# Patient Record
Sex: FEMALE | Race: WHITE | Employment: FULL TIME | URBAN - METROPOLITAN AREA
[De-identification: names, ages, dates, MRNs, and addresses within clinical notes are randomized per-mention and may not be internally consistent; named-entity substitution may affect disease eponyms.]

---

## 2019-03-27 ENCOUNTER — APPOINTMENT (OUTPATIENT)
Dept: GENERAL RADIOLOGY | Age: 74
End: 2019-03-27
Payer: COMMERCIAL

## 2019-03-27 ENCOUNTER — HOSPITAL ENCOUNTER (EMERGENCY)
Age: 74
Discharge: HOME | End: 2019-03-27
Attending: EMERGENCY MEDICINE
Payer: COMMERCIAL

## 2019-03-27 ENCOUNTER — APPOINTMENT (OUTPATIENT)
Dept: CT IMAGING | Age: 74
End: 2019-03-27
Payer: COMMERCIAL

## 2019-03-27 VITALS
TEMPERATURE: 98.5 F | DIASTOLIC BLOOD PRESSURE: 67 MMHG | HEART RATE: 77 BPM | BODY MASS INDEX: 32.44 KG/M2 | SYSTOLIC BLOOD PRESSURE: 155 MMHG | OXYGEN SATURATION: 97 % | RESPIRATION RATE: 16 BRPM | HEIGHT: 64 IN | WEIGHT: 190 LBS

## 2019-03-27 DIAGNOSIS — S09.90XA CLOSED HEAD INJURY, INITIAL ENCOUNTER: Primary | ICD-10-CM

## 2019-03-27 DIAGNOSIS — S83.92XA SPRAIN OF LEFT KNEE, UNSPECIFIED LIGAMENT, INITIAL ENCOUNTER: ICD-10-CM

## 2019-03-27 PROCEDURE — 73562 X-RAY EXAM OF KNEE 3: CPT

## 2019-03-27 PROCEDURE — 70450 CT HEAD/BRAIN W/O DYE: CPT

## 2019-03-27 PROCEDURE — 99284 EMERGENCY DEPT VISIT MOD MDM: CPT

## 2019-03-27 RX ORDER — LEVOTHYROXINE SODIUM 137 UG/1
137 TABLET ORAL
COMMUNITY
Start: 2019-01-03

## 2019-03-27 RX ORDER — PAROXETINE HYDROCHLORIDE 20 MG/1
20 TABLET, FILM COATED ORAL
COMMUNITY

## 2019-03-27 RX ORDER — NAPROXEN 500 MG/1
500 TABLET ORAL 2 TIMES DAILY WITH MEALS
Qty: 14 TABLET | Refills: 0 | Status: SHIPPED | OUTPATIENT
Start: 2019-03-27

## 2019-03-27 ASSESSMENT — PAIN SCALES - GENERAL: PAINLEVEL_OUTOF10: 5

## 2019-03-27 ASSESSMENT — PAIN DESCRIPTION - LOCATION: LOCATION: KNEE

## 2019-03-27 ASSESSMENT — PAIN DESCRIPTION - DESCRIPTORS: DESCRIPTORS: ACHING

## 2019-03-27 ASSESSMENT — PAIN DESCRIPTION - ONSET: ONSET: SUDDEN

## 2019-03-27 ASSESSMENT — PAIN DESCRIPTION - PAIN TYPE: TYPE: ACUTE PAIN

## 2019-03-27 ASSESSMENT — PAIN DESCRIPTION - ORIENTATION: ORIENTATION: LEFT

## 2019-12-18 ENCOUNTER — TELEPHONE (OUTPATIENT)
Dept: GASTROENTEROLOGY | Age: 74
End: 2019-12-18

## 2022-08-09 ENCOUNTER — HOSPITAL ENCOUNTER (OUTPATIENT)
Age: 77
Setting detail: SPECIMEN
Discharge: HOME | End: 2022-08-09

## 2022-08-10 LAB
CULTURE: NORMAL
SPECIMEN DESCRIPTION: NORMAL

## 2022-08-11 LAB
CULTURE: ABNORMAL
DIRECT EXAM: ABNORMAL
DIRECT EXAM: ABNORMAL
SPECIMEN DESCRIPTION: ABNORMAL

## 2022-08-14 LAB
CULTURE: ABNORMAL
CULTURE: ABNORMAL
DIRECT EXAM: ABNORMAL
DIRECT EXAM: ABNORMAL
SPECIMEN DESCRIPTION: ABNORMAL

## 2023-03-01 ENCOUNTER — HOSPITAL ENCOUNTER (OUTPATIENT)
Age: 78
Setting detail: SPECIMEN
Discharge: HOME | End: 2023-03-01

## 2023-03-01 LAB
25(OH)D3 SERPL-MCNC: 18.2 NG/ML
ABSOLUTE EOS #: 0.07 K/UL (ref 0–0.44)
ABSOLUTE IMMATURE GRANULOCYTE: <0.03 K/UL (ref 0–0.3)
ABSOLUTE LYMPH #: 1.94 K/UL (ref 1.1–3.7)
ABSOLUTE MONO #: 0.33 K/UL (ref 0.1–1.2)
ALBUMIN SERPL-MCNC: 4.8 G/DL (ref 3.5–5.2)
ALBUMIN/GLOBULIN RATIO: 1.5 (ref 1–2.5)
ALP SERPL-CCNC: 92 U/L (ref 35–104)
ALT SERPL-CCNC: 28 U/L (ref 5–33)
ANION GAP SERPL CALCULATED.3IONS-SCNC: 18 MMOL/L (ref 9–17)
AST SERPL-CCNC: 34 U/L
BASOPHILS # BLD: 0 % (ref 0–2)
BASOPHILS ABSOLUTE: 0.03 K/UL (ref 0–0.2)
BILIRUB SERPL-MCNC: 0.6 MG/DL (ref 0.3–1.2)
BILIRUBIN URINE: NEGATIVE
BUN SERPL-MCNC: 13 MG/DL (ref 8–23)
CALCIUM SERPL-MCNC: 9.8 MG/DL (ref 8.6–10.4)
CASTS UA: NORMAL /LPF (ref 0–8)
CHLORIDE SERPL-SCNC: 103 MMOL/L (ref 98–107)
CO2 SERPL-SCNC: 22 MMOL/L (ref 20–31)
COLOR: YELLOW
CREAT SERPL-MCNC: 1 MG/DL (ref 0.5–0.9)
CRP SERPL HS-MCNC: <3 MG/L (ref 0–5)
EOSINOPHILS RELATIVE PERCENT: 1 % (ref 1–4)
EPITHELIAL CELLS UA: NORMAL /HPF (ref 0–5)
ERYTHROCYTE [SEDIMENTATION RATE] IN BLOOD BY WESTERGREN METHOD: 1 MM/HR (ref 0–30)
FOLATE SERPL-MCNC: 10.2 NG/ML
GFR SERPL CREATININE-BSD FRML MDRD: 58 ML/MIN/1.73M2
GLUCOSE SERPL-MCNC: 78 MG/DL (ref 70–99)
GLUCOSE UR STRIP.AUTO-MCNC: NEGATIVE MG/DL
HCT VFR BLD AUTO: 49.5 % (ref 36.3–47.1)
HGB BLD-MCNC: 16.6 G/DL (ref 11.9–15.1)
HIV 1+2 AB+HIV1 P24 AG SERPL QL IA: NONREACTIVE
IMMATURE GRANULOCYTES: 0 %
KETONES UR STRIP.AUTO-MCNC: NEGATIVE MG/DL
LEUKOCYTE ESTERASE UR QL STRIP.AUTO: ABNORMAL
LYMPHOCYTES # BLD: 28 % (ref 24–43)
MCH RBC QN AUTO: 32.5 PG (ref 25.2–33.5)
MCHC RBC AUTO-ENTMCNC: 33.5 G/DL (ref 28.4–34.8)
MCV RBC AUTO: 96.9 FL (ref 82.6–102.9)
MONOCYTES # BLD: 5 % (ref 3–12)
NITRITE UR QL STRIP.AUTO: NEGATIVE
NRBC AUTOMATED: 0 PER 100 WBC
PDW BLD-RTO: 13.3 % (ref 11.8–14.4)
PLATELET # BLD AUTO: 212 K/UL (ref 138–453)
PMV BLD AUTO: 10.9 FL (ref 8.1–13.5)
POTASSIUM SERPL-SCNC: 4.4 MMOL/L (ref 3.7–5.3)
PROT SERPL-MCNC: 7.9 G/DL (ref 6.4–8.3)
PROT UR STRIP.AUTO-MCNC: 5.5 MG/DL (ref 5–8)
PROT UR STRIP.AUTO-MCNC: NEGATIVE MG/DL
RBC # BLD: 5.11 M/UL (ref 3.95–5.11)
RBC CLUMPS #/AREA URNS AUTO: NORMAL /HPF (ref 0–4)
SEG NEUTROPHILS: 66 % (ref 36–65)
SEGMENTED NEUTROPHILS ABSOLUTE COUNT: 4.45 K/UL (ref 1.5–8.1)
SODIUM SERPL-SCNC: 143 MMOL/L (ref 135–144)
SPECIFIC GRAVITY UA: 1.01 (ref 1–1.03)
T4 FREE SERPL-MCNC: 0.45 NG/DL (ref 0.93–1.7)
TSH SERPL-ACNC: 176.9 UIU/ML (ref 0.3–5)
TURBIDITY: CLEAR
URINE HGB: ABNORMAL
UROBILINOGEN, URINE: NORMAL
VIT B12 SERPL-MCNC: 341 PG/ML (ref 232–1245)
WBC # BLD AUTO: 6.8 K/UL (ref 3.5–11.3)
WBC UA: NORMAL /HPF (ref 0–5)

## 2023-03-02 LAB
ANA SER QL IA: NEGATIVE
DSDNA IGG SER QL IA: <0.5 IU/ML
MICROORGANISM SPEC CULT: NORMAL
NUCLEAR IGG SER IA-RTO: 0.2 U/ML
SPECIMEN DESCRIPTION: NORMAL

## 2023-03-07 ENCOUNTER — OFFICE VISIT (OUTPATIENT)
Dept: NEUROLOGY | Age: 78
End: 2023-03-07
Payer: COMMERCIAL

## 2023-03-07 VITALS
RESPIRATION RATE: 18 BRPM | BODY MASS INDEX: 32.79 KG/M2 | HEART RATE: 57 BPM | OXYGEN SATURATION: 98 % | SYSTOLIC BLOOD PRESSURE: 105 MMHG | TEMPERATURE: 98 F | DIASTOLIC BLOOD PRESSURE: 65 MMHG | WEIGHT: 191 LBS

## 2023-03-07 DIAGNOSIS — R41.89 COGNITIVE IMPAIRMENT: Primary | ICD-10-CM

## 2023-03-07 PROCEDURE — 99204 OFFICE O/P NEW MOD 45 MIN: CPT | Performed by: STUDENT IN AN ORGANIZED HEALTH CARE EDUCATION/TRAINING PROGRAM

## 2023-03-07 PROCEDURE — 1123F ACP DISCUSS/DSCN MKR DOCD: CPT | Performed by: STUDENT IN AN ORGANIZED HEALTH CARE EDUCATION/TRAINING PROGRAM

## 2023-03-07 RX ORDER — DONEPEZIL HYDROCHLORIDE 5 MG/1
5 TABLET, FILM COATED ORAL NIGHTLY
Qty: 30 TABLET | Refills: 3 | Status: SHIPPED | OUTPATIENT
Start: 2023-03-07

## 2023-03-07 RX ORDER — METOPROLOL SUCCINATE 25 MG/1
25 TABLET, EXTENDED RELEASE ORAL 2 TIMES DAILY
COMMUNITY

## 2023-03-07 RX ORDER — MELATONIN
1000 DAILY
Qty: 90 TABLET | Refills: 1 | Status: SHIPPED | OUTPATIENT
Start: 2023-03-07

## 2023-03-07 ASSESSMENT — ENCOUNTER SYMPTOMS
WHEEZING: 0
VOMITING: 0
PHOTOPHOBIA: 0
NAUSEA: 0
BACK PAIN: 0

## 2023-03-07 NOTE — LETTER
March 7, 2023      Yaritza Mckinnon, [address]      Patient: Harshil Banks   MR Number: [id number]   YOB: 1945   Date of Visit: 3/7/2023       Dear Yaritza Mckinnon: Thank you for referring Valentino Angry to me for evaluation/treatment. Below are the relevant portions of my assessment and plan of care. Patient given low vitamin D and history of hypothyroidism with recent low T4 can be associated with cognitive impairment. Although there is some domains that she also struggles therefore we plan to get neuropsych evaluation and follow-up on the MRI brain. We started patient on Aricept 5 mg nightly. If you have questions, please do not hesitate to call me. I look forward to following Carrie Gregory along with you.     Sincerely, Dillon Cordon MD

## 2023-03-07 NOTE — PROGRESS NOTES
[address] [place] [address],  [address], [facility], [address], [address] [place] [address]  P: [phone]  F: [phone]    NEUROLOGY CLINIC NOTE     PATIENT NAME: Ghislaine Foster  PATIENT MRN: [id number]  PRIMARY CARE PHYSICIAN: Flor Prakash MD    HPI:      Ghislaine Foster is a 69 y.o. right handed  female with PMH significant for depression on fluoxetine, dyslipidemia, vitamin D deficiency hypothyroidism on levothyroxine, GERD, vitamin D deficiency, hypertension and anxiety presented after being referred by PCP Dr. Violeta Fonseca for concern of cognitive impairment/early dementia. On my evaluation, patient is otherwise healthy with no history of diabetes or hypertension but has history of depression on fluoxetine (denies any worsening of symptoms or feeling of suicidal thoughts or ideation) complaint of having difficulty focusing on things and sometimes finding it difficult to remember things. She is otherwise independent lives alone and has a cat. She is independent in her daily chores. Able to do her own grocery and do her own finances. She also drives and does not have any issues in the neighborhood or getting lost (sometimes have some GPS issues but otherwise she does not have any issues). She is able to cook, bath, change close, eat and is currently retired. She used to be a  but now is retired. On exam, she was awake, alert oriented x2. No cranial nerve deficit noticed. No motor or sensory deficit noticed. On [address] [place] evaluation, she was a little anxious and scored 19 on the test. Patient has an MRI brain with and without contrast ordered by primary care physician Dr. Faye Fink. Patient has low T3 and eleavted TSH. Vitamin D is 18. Vitamin B12 is on the lower side. Discussed with patient that given she has scored only 19 we plan to start on Aricept.   We will conveyed to primary care physician about readjusting thyroid medication as hypothyroidism can also be associated with cognitive impairment. Plan to get neuropsych evaluation. Started on vitamin B12 and vitamin D3 replacement. Plan to follow-up in 4 months. PATIENT HISTORY:     Past Medical History:   Diagnosis Date    Thyroid disease         No past surgical history on file. Social History     Socioeconomic History    Marital status: Single     Spouse name: Not on file    Number of children: Not on file    Years of education: Not on file    Highest education level: Not on file   Occupational History    Not on file   Tobacco Use    Smoking status: Never    Smokeless tobacco: Never   Substance and Sexual Activity    Alcohol use: Never    Drug use: Never    Sexual activity: Not on file   Other Topics Concern    Not on file   Social History Narrative    Not on file     Social Determinants of Health     Financial Resource Strain: Not on file   Food Insecurity: Not on file   Transportation Needs: Not on file   Physical Activity: Not on file   Stress: Not on file   Social Connections: Not on file   Intimate Partner Violence: Not on file   Housing Stability: Not on file        Current Outpatient Medications   Medication Sig Dispense Refill    levothyroxine (SYNTHROID) 137 MCG tablet Take 137 mcg by mouth      PARoxetine (PAXIL) 20 MG tablet Take 20 mg by mouth      naproxen (NAPROSYN) 500 MG tablet Take 1 tablet by mouth 2 times daily (with meals) 14 tablet 0     No current facility-administered medications for this visit. Allergies   Allergen Reactions    Latex      Band Aid    Tetanus Toxoid      seizures        REVIEW OF SYSTEMS:     Review of Systems   Constitutional:  Negative for fatigue. Eyes:  Negative for photophobia and visual disturbance. Respiratory:  Negative for wheezing. Cardiovascular:  Negative for chest pain. Gastrointestinal:  Negative for nausea and vomiting. Genitourinary:  Negative for dysuria. Musculoskeletal:  Negative for back pain.    Neurological:  Negative for facial asymmetry, speech difficulty and light-headedness. Psychiatric/Behavioral:  Positive for decreased concentration. Negative for sleep disturbance and suicidal ideas. The patient is not nervous/anxious. Memory issues      VITALS  There were no vitals taken for this visit. PHYSICAL EXAMINATION:     Physical Exam     Constitutional: Well developed, well nourished and in no acute distress. Head:  normocephalic, atraumatic. Neck: supple, no carotid bruits, thyroid not palpable  Respiratory: Clear to auscultation bilaterally with no use of accessory muscles during respiration. Cardiovascular: normal rate, regular rhythm, no murmur, gallop, rub. Abdomen: Soft, nontender, nondistended, normal bowel sounds,    Extremities:  peripheral pulses palpable, no pedal edema or calf pain with palpation  Psych: normal affect      NEUROLOGICAL EXAMINATION:     Mental status   Awake, alert and oriented to herself, place, month and year but unable to recall date. Poor immediate and delayed recall. Difficulty in simple subtraction. , speech or language problems; no hallucinations or delusions     Cranial nerves   II - visual fields intact to confrontation                                                III, IV, VI - extra-ocular muscles full: no pupillary defect; no RUTHY, no nystagmus, left eye drooping (ptosis) as per patient she had injury and it has been a little worse since.   V - normal facial sensation                                                               VII - normal facial symmetry                                                             VIII - intact hearing                                                                             IX, X - symmetrical palate                                                                  XI - symmetrical shoulder shrug                                                       XII - midline tongue without atrophy or fasciculation     Motor function  Normal muscle bulk and tone  Muscle strength: normal power 5/5  fine motor movements     Sensory function Intact to touch, pin prick, vibration, proprioception in bilateral upper and lower extremities. Cerebellar Intact fine motor movement. No involuntary movements or tremors     Reflex function Intact 2+ DTR and symmetric. Negative Babinski     Gait                  Normal station and gait           PRIOR TESTS AND IMAGING: Following images and Labs were reviewed by the examiner     CT head without contrast 3/27/2019: No acute intracranial abnormality. 3/1/2023  Vit D, 25-Hydroxy >29.9 ng/mL 18.2 Low       Thyroxine, Free 0.93 - 1.70 ng/dL 0.45 Low       TSH 0.30 - 5.00 uIU/mL 176.90 High       Component Ref Range & Units 3/1/23 1200   Vitamin B-12 232 - 1245 pg/mL 341    Folate >4.8 ng/mL 10.2        ASSESSMENT       Isacc Phillips is a 69 y.o. right handed  female with PMH significant for depression on fluoxetine, dyslipidemia, vitamin D deficiency hypothyroidism on levothyroxine, GERD, vitamin D deficiency, hypertension and anxiety presented after being referred by PCP Dr. Emil Dejesus for concern of cognitive impairment/early dementia. MOCA score 19. Cognitive impairment; could be secondary from hypothyroidism or low vitamin D or pseudodementia from depression or anxiety  Neuropsych evaluation early dementia. Hypothyroidism elevated T4  Low vitamin D      PLAN:     Plan to follow-up on MRI brain w/wo ordered by the PCP. Neuropsych evaluation. Aricept 5 mg nightly  Vitamin B12 1000 mcg daily  Vitamin D3 1000 mcg daily. Patient was counseled about the side effects from Aricept such as nausea vomiting cramps. Plan to reach out to PCP if levothyroxine can be adjusted based on low thyroid hormone. Patient was counseled about how low vitamin D or thyroid hormone can also present as pseudodementia.       Follow up in the clinic in 4 months  Instructed patient to call the clinic if symptoms worsen or develop any new symptoms.     Ms. Tran received counseling on the following healthy behaviors: medical compliance, smoking cessation, blood pressure control, regular follow up with primary doctor.      I have spent 60 minutes face to face with the patient more than 50% of this time was spent counseling and coordinating care.      Electronically signed by Nathaniel Riggins MD on 3/7/2023 at 1:28 PM

## 2023-03-09 ENCOUNTER — TRANSCRIBE ORDERS (OUTPATIENT)
Dept: ADMINISTRATIVE | Age: 78
End: 2023-03-09

## 2023-03-14 ENCOUNTER — HOSPITAL ENCOUNTER (OUTPATIENT)
Dept: MRI IMAGING | Age: 78
Discharge: HOME | End: 2023-03-16
Payer: COMMERCIAL

## 2023-03-14 DIAGNOSIS — R41.89 COGNITIVE IMPAIRMENT: ICD-10-CM

## 2023-03-14 PROCEDURE — A9579 GAD-BASE MR CONTRAST NOS,1ML: HCPCS | Performed by: FAMILY MEDICINE

## 2023-03-14 PROCEDURE — 70553 MRI BRAIN STEM W/O & W/DYE: CPT

## 2023-03-14 PROCEDURE — 6360000004 HC RX CONTRAST MEDICATION: Performed by: FAMILY MEDICINE

## 2023-03-14 RX ADMIN — GADOTERIDOL 18 ML: 279.3 INJECTION, SOLUTION INTRAVENOUS at 11:01

## 2023-05-11 ENCOUNTER — HOSPITAL ENCOUNTER (OUTPATIENT)
Age: 78
Setting detail: SPECIMEN
Discharge: HOME | End: 2023-05-11

## 2023-05-12 LAB
25(OH)D3 SERPL-MCNC: 24.6 NG/ML
FOLATE SERPL-MCNC: 16.6 NG/ML
T4 FREE SERPL-MCNC: 1.8 NG/DL (ref 0.9–1.7)
TSH SERPL-ACNC: 0.05 UIU/ML (ref 0.3–5)
VIT B12 SERPL-MCNC: 536 PG/ML (ref 232–1245)

## 2023-07-06 RX ORDER — LANOLIN ALCOHOL/MO/W.PET/CERES
CREAM (GRAM) TOPICAL
Qty: 30 TABLET | Refills: 3 | Status: SHIPPED | OUTPATIENT
Start: 2023-07-06

## 2023-07-06 NOTE — TELEPHONE ENCOUNTER
E-scribe requesting refill for Vitamin B-12. Please review and e-scribe if applicable.      Last Visit Date: 3/7/2023    Next Visit Date:  Future Appointments   Date Time Provider [address] [place] [address]   7/18/2023  1:30 PM Abel Meyer MD Neuro [facility] Neurology -

## 2023-07-13 RX ORDER — DONEPEZIL HYDROCHLORIDE 5 MG/1
TABLET, FILM COATED ORAL
Qty: 30 TABLET | Refills: 3 | Status: SHIPPED | OUTPATIENT
Start: 2023-07-13

## 2023-07-13 NOTE — TELEPHONE ENCOUNTER
E-scribe requesting refill for Donepezil. Please review and e-scribe if applicable.      Last Visit Date: 03/07/23    Next Visit Date:  Future Appointments   Date Time Provider [address] [place] [address]   7/18/2023  1:30 PM Nolan Murillo MD Neuro [facility] Neurology -

## 2023-07-18 ENCOUNTER — OFFICE VISIT (OUTPATIENT)
Dept: NEUROLOGY | Age: 78
End: 2023-07-18
Payer: COMMERCIAL

## 2023-07-18 VITALS
TEMPERATURE: 97 F | BODY MASS INDEX: 29.4 KG/M2 | HEART RATE: 55 BPM | DIASTOLIC BLOOD PRESSURE: 71 MMHG | WEIGHT: 172.2 LBS | HEIGHT: 64 IN | OXYGEN SATURATION: 97 % | SYSTOLIC BLOOD PRESSURE: 109 MMHG

## 2023-07-18 DIAGNOSIS — R41.89 COGNITIVE IMPAIRMENT: Primary | ICD-10-CM

## 2023-07-18 DIAGNOSIS — R41.840 ATTENTION DEFICIT: ICD-10-CM

## 2023-07-18 PROCEDURE — 99214 OFFICE O/P EST MOD 30 MIN: CPT | Performed by: STUDENT IN AN ORGANIZED HEALTH CARE EDUCATION/TRAINING PROGRAM

## 2023-07-18 PROCEDURE — 1123F ACP DISCUSS/DSCN MKR DOCD: CPT | Performed by: STUDENT IN AN ORGANIZED HEALTH CARE EDUCATION/TRAINING PROGRAM

## 2023-07-18 RX ORDER — DONEPEZIL HYDROCHLORIDE 5 MG/1
10 TABLET, FILM COATED ORAL NIGHTLY
Qty: 30 TABLET | Refills: 3 | Status: SHIPPED | OUTPATIENT
Start: 2023-07-18

## 2023-07-18 RX ORDER — UREA 10 %
1 LOTION (ML) TOPICAL NIGHTLY PRN
Qty: 30 TABLET | Refills: 1 | Status: SHIPPED | OUTPATIENT
Start: 2023-07-18

## 2023-07-18 ASSESSMENT — ENCOUNTER SYMPTOMS
BACK PAIN: 0
NAUSEA: 0
VOMITING: 0
WHEEZING: 0
PHOTOPHOBIA: 0

## 2023-08-01 ENCOUNTER — TELEPHONE (OUTPATIENT)
Dept: NEUROLOGY | Age: 78
End: 2023-08-01

## 2023-08-01 NOTE — TELEPHONE ENCOUNTER
The patient has been watching a doctor on television talking about the side effects. Patient daughter stated that the patient said she is going to start weaning herself off all her medication.

## 2023-08-17 ENCOUNTER — TELEPHONE (OUTPATIENT)
Dept: NEUROLOGY | Age: 78
End: 2023-08-17

## 2023-08-17 DIAGNOSIS — R41.89 COGNITIVE IMPAIRMENT: Primary | ICD-10-CM

## 2023-11-21 ENCOUNTER — OFFICE VISIT (OUTPATIENT)
Dept: NEUROLOGY | Age: 78
End: 2023-11-21

## 2023-11-21 VITALS
BODY MASS INDEX: 29.37 KG/M2 | HEART RATE: 60 BPM | DIASTOLIC BLOOD PRESSURE: 68 MMHG | WEIGHT: 172 LBS | HEIGHT: 64 IN | SYSTOLIC BLOOD PRESSURE: 119 MMHG | OXYGEN SATURATION: 96 %

## 2023-11-21 DIAGNOSIS — F02.B0 MODERATE LATE ONSET ALZHEIMER'S DEMENTIA WITHOUT BEHAVIORAL DISTURBANCE, PSYCHOTIC DISTURBANCE, MOOD DISTURBANCE, OR ANXIETY (HCC): ICD-10-CM

## 2023-11-21 DIAGNOSIS — G30.1 MODERATE LATE ONSET ALZHEIMER'S DEMENTIA WITHOUT BEHAVIORAL DISTURBANCE, PSYCHOTIC DISTURBANCE, MOOD DISTURBANCE, OR ANXIETY (HCC): ICD-10-CM

## 2023-11-21 DIAGNOSIS — R41.89 COGNITIVE IMPAIRMENT: Primary | ICD-10-CM

## 2023-11-21 RX ORDER — DONEPEZIL HYDROCHLORIDE 5 MG/1
10 TABLET, FILM COATED ORAL NIGHTLY
Qty: 30 TABLET | Refills: 3 | Status: SHIPPED | OUTPATIENT
Start: 2023-11-21

## 2023-11-21 RX ORDER — MELATONIN
1000 DAILY
Qty: 90 TABLET | Refills: 1 | Status: SHIPPED | OUTPATIENT
Start: 2023-11-21

## 2023-11-21 RX ORDER — LANOLIN ALCOHOL/MO/W.PET/CERES
1000 CREAM (GRAM) TOPICAL DAILY
Qty: 30 TABLET | Refills: 3 | Status: SHIPPED | OUTPATIENT
Start: 2023-11-21

## 2023-11-21 ASSESSMENT — ENCOUNTER SYMPTOMS
PHOTOPHOBIA: 0
WHEEZING: 0
BACK PAIN: 0
VOMITING: 0
NAUSEA: 0

## 2023-11-21 NOTE — PROGRESS NOTES
[address], [facility] [place] [facility], [address], [address]  P: [phone]  F: [phone]    NEUROLOGY CLINIC NOTE     PATIENT NAME: David Roca  PATIENT MRN: [id number]  PRIMARY CARE PHYSICIAN: Adrian Pineda MD    HPI:      David Roca is a 69 y.o. right handed  female with PMH significant for depression on fluoxetine, dyslipidemia, vitamin D deficiency hypothyroidism on levothyroxine, GERD, vitamin D deficiency, hypertension and anxiety presented after being referred by PCP Dr. Josh Weiss for concern of cognitive impairment/early dementia. On my evaluation, patient is otherwise healthy with no history of diabetes or hypertension but has history of depression on fluoxetine (denies any worsening of symptoms or feeling of suicidal thoughts or ideation) complaint of having difficulty focusing on things and sometimes finding it difficult to remember things. She is otherwise independent lives alone and has a cat. She is independent in her daily chores. Able to do her own grocery and do her own finances. She also drives and does not have any issues in the neighborhood or getting lost (sometimes have some GPS issues but otherwise she does not have any issues). She is able to cook, bath, change close, eat and is currently retired. She used to be a  but now is retired. On exam, she was awake, alert oriented x2. No cranial nerve deficit noticed. No motor or sensory deficit noticed. On [facility] evaluation, she was a little anxious and scored 19 on the test. Patient has an MRI brain with and without contrast ordered by primary care physician Dr. Nikolas Love. Patient has low T3 and eleavted TSH. Vitamin D is 18. Vitamin B12 is on the lower side. Discussed with patient that given she has scored only 19 we plan to start on Aricept.   We will conveyed to primary care physician about readjusting thyroid medication as hypothyroidism can also

## 2023-12-13 ENCOUNTER — TELEPHONE (OUTPATIENT)
Dept: NEUROSURGERY | Age: 78
End: 2023-12-13

## 2023-12-13 DIAGNOSIS — R41.89 MODERATE COGNITIVE IMPAIRMENT: Primary | ICD-10-CM

## 2023-12-14 NOTE — TELEPHONE ENCOUNTER
[facility]abil[facility]) called and stated Dae Reyes is refusing all medication, she's angry, hallucinating and seem to be out of [address] with reality. Mara Fortune is requesting a referral for . Please call Mara Fortune at [phone].

## 2023-12-14 NOTE — TELEPHONE ENCOUNTER
Order placed for social work consult. Patient's granddaughter Dior Jones [facility]) called, patient's condition and further treatment and follow-up are discussed. All concerns and questions addressed.       Simona Cast MD  Neurology Resident PGY-4  12/14/2023  2:12 PM

## 2024-01-25 ENCOUNTER — TELEPHONE (OUTPATIENT)
Dept: NEUROLOGY | Age: 79
End: 2024-01-25

## 2024-01-25 NOTE — TELEPHONE ENCOUNTER
\"FYI\"  Patient caregiver Nabila called, they have not yet received a call from the .  I faxed letter of referral to the PCP along with clinical notes, they will get her taken care of.

## 2024-03-01 ENCOUNTER — HOSPITAL ENCOUNTER (OUTPATIENT)
Age: 79
Setting detail: SPECIMEN
Discharge: HOME | End: 2024-03-01

## 2024-03-01 LAB
25(OH)D3 SERPL-MCNC: 26.2 NG/ML
ANION GAP SERPL CALCULATED.3IONS-SCNC: 10 MMOL/L (ref 9–17)
BASOPHILS # BLD: <0.03 K/UL (ref 0–0.2)
BASOPHILS NFR BLD: 0 % (ref 0–2)
BUN SERPL-MCNC: 16 MG/DL (ref 8–23)
CALCIUM SERPL-MCNC: 9.3 MG/DL (ref 8.6–10.4)
CHLORIDE SERPL-SCNC: 103 MMOL/L (ref 98–107)
CHOLEST SERPL-MCNC: 202 MG/DL
CHOLESTEROL/HDL RATIO: 3.7
CO2 SERPL-SCNC: 24 MMOL/L (ref 20–31)
CREAT SERPL-MCNC: 0.7 MG/DL (ref 0.5–0.9)
EOSINOPHIL # BLD: 0.04 K/UL (ref 0–0.44)
EOSINOPHILS RELATIVE PERCENT: 1 % (ref 1–4)
ERYTHROCYTE [DISTWIDTH] IN BLOOD BY AUTOMATED COUNT: 12.7 % (ref 11.8–14.4)
EST. AVERAGE GLUCOSE BLD GHB EST-MCNC: 94 MG/DL
GFR SERPL CREATININE-BSD FRML MDRD: >60 ML/MIN/1.73M2
GLUCOSE P FAST SERPL-MCNC: 89 MG/DL (ref 70–99)
HBA1C MFR BLD: 4.9 % (ref 4–6)
HCT VFR BLD AUTO: 47 % (ref 36.3–47.1)
HDLC SERPL-MCNC: 54 MG/DL
HGB BLD-MCNC: 15.8 G/DL (ref 11.9–15.1)
IMM GRANULOCYTES # BLD AUTO: <0.03 K/UL (ref 0–0.3)
IMM GRANULOCYTES NFR BLD: 0 %
LDLC SERPL CALC-MCNC: 123 MG/DL (ref 0–130)
LYMPHOCYTES NFR BLD: 1.61 K/UL (ref 1.1–3.7)
LYMPHOCYTES RELATIVE PERCENT: 29 % (ref 24–43)
MCH RBC QN AUTO: 33.5 PG (ref 25.2–33.5)
MCHC RBC AUTO-ENTMCNC: 33.6 G/DL (ref 28.4–34.8)
MCV RBC AUTO: 99.6 FL (ref 82.6–102.9)
MONOCYTES NFR BLD: 0.37 K/UL (ref 0.1–1.2)
MONOCYTES NFR BLD: 7 % (ref 3–12)
NEUTROPHILS NFR BLD: 63 % (ref 36–65)
NEUTS SEG NFR BLD: 3.43 K/UL (ref 1.5–8.1)
NRBC BLD-RTO: 0 PER 100 WBC
PLATELET # BLD AUTO: 230 K/UL (ref 138–453)
PMV BLD AUTO: 10.7 FL (ref 8.1–13.5)
POTASSIUM SERPL-SCNC: 4.4 MMOL/L (ref 3.7–5.3)
RBC # BLD AUTO: 4.72 M/UL (ref 3.95–5.11)
SODIUM SERPL-SCNC: 137 MMOL/L (ref 135–144)
TRIGL SERPL-MCNC: 123 MG/DL
TSH SERPL DL<=0.05 MIU/L-ACNC: 2.51 UIU/ML (ref 0.3–5)
VIT B12 SERPL-MCNC: 411 PG/ML (ref 232–1245)
WBC OTHER # BLD: 5.5 K/UL (ref 3.5–11.3)

## 2024-04-22 ENCOUNTER — TRANSCRIBE ORDERS (OUTPATIENT)
Dept: ADMINISTRATIVE | Age: 79
End: 2024-04-22

## 2024-04-22 DIAGNOSIS — F03.90 SENILE DEMENTIA, UNCOMPLICATED (HCC): Primary | ICD-10-CM

## 2024-04-22 DIAGNOSIS — D32.0 BENIGN NEOPLASM OF CEREBRAL MENINGES (HCC): ICD-10-CM

## 2024-04-24 ENCOUNTER — HOSPITAL ENCOUNTER (OUTPATIENT)
Dept: MRI IMAGING | Age: 79
Discharge: HOME | End: 2024-04-26
Attending: FAMILY MEDICINE
Payer: MEDICARE

## 2024-04-24 DIAGNOSIS — D32.0 BENIGN NEOPLASM OF CEREBRAL MENINGES (HCC): ICD-10-CM

## 2024-04-24 DIAGNOSIS — F03.90 SENILE DEMENTIA, UNCOMPLICATED (HCC): ICD-10-CM

## 2024-04-24 LAB
EGFR, POC: 66 ML/MIN/1.73M2
POC CREATININE: 0.9 MG/DL (ref 0.51–1.19)

## 2024-04-24 PROCEDURE — 6360000004 HC RX CONTRAST MEDICATION: Performed by: FAMILY MEDICINE

## 2024-04-24 PROCEDURE — A9576 INJ PROHANCE MULTIPACK: HCPCS | Performed by: FAMILY MEDICINE

## 2024-04-24 PROCEDURE — 70553 MRI BRAIN STEM W/O & W/DYE: CPT

## 2024-04-24 PROCEDURE — 2580000003 HC RX 258: Performed by: FAMILY MEDICINE

## 2024-04-24 PROCEDURE — 82565 ASSAY OF CREATININE: CPT

## 2024-04-24 RX ORDER — SODIUM CHLORIDE 0.9 % (FLUSH) 0.9 %
10 SYRINGE (ML) INJECTION PRN
Status: DISCONTINUED | OUTPATIENT
Start: 2024-04-24 | End: 2024-04-27 | Stop reason: HOSPADM

## 2024-04-24 RX ADMIN — GADOTERIDOL 13 ML: 279.3 INJECTION, SOLUTION INTRAVENOUS at 09:53

## 2024-04-24 RX ADMIN — SODIUM CHLORIDE, PRESERVATIVE FREE 10 ML: 5 INJECTION INTRAVENOUS at 09:53

## 2025-04-05 ENCOUNTER — HOSPITAL ENCOUNTER (OUTPATIENT)
Age: 80
Setting detail: SPECIMEN
Discharge: HOME | End: 2025-04-05

## 2025-04-05 LAB
25(OH)D3 SERPL-MCNC: 21.6 NG/ML (ref 30–100)
ALBUMIN SERPL-MCNC: 4.5 G/DL (ref 3.5–5.2)
ALBUMIN/GLOB SERPL: 1.9 {RATIO} (ref 1–2.5)
ALP SERPL-CCNC: 65 U/L (ref 35–104)
ALT SERPL-CCNC: 38 U/L (ref 10–35)
ANION GAP SERPL CALCULATED.3IONS-SCNC: 13 MMOL/L (ref 9–16)
AST SERPL-CCNC: 49 U/L (ref 10–35)
BILIRUB SERPL-MCNC: 0.5 MG/DL (ref 0–1.2)
BUN SERPL-MCNC: 17 MG/DL (ref 8–23)
CALCIUM SERPL-MCNC: 9.7 MG/DL (ref 8.6–10.4)
CHLORIDE SERPL-SCNC: 108 MMOL/L (ref 98–107)
CO2 SERPL-SCNC: 26 MMOL/L (ref 20–31)
CREAT SERPL-MCNC: 1.2 MG/DL (ref 0.6–0.9)
ERYTHROCYTE [DISTWIDTH] IN BLOOD BY AUTOMATED COUNT: 13 % (ref 11.8–14.4)
ERYTHROCYTE [SEDIMENTATION RATE] IN BLOOD BY PHOTOMETRIC METHOD: 4 MM/HR (ref 0–30)
GFR, ESTIMATED: 46 ML/MIN/1.73M2
GLUCOSE SERPL-MCNC: 110 MG/DL (ref 74–99)
HCT VFR BLD AUTO: 41 % (ref 36.3–47.1)
HGB BLD-MCNC: 13.5 G/DL (ref 11.9–15.1)
MCH RBC QN AUTO: 34.4 PG (ref 25.2–33.5)
MCHC RBC AUTO-ENTMCNC: 32.9 G/DL (ref 28.4–34.8)
MCV RBC AUTO: 104.3 FL (ref 82.6–102.9)
NRBC BLD-RTO: 0 PER 100 WBC
PLATELET # BLD AUTO: 169 K/UL (ref 138–453)
PMV BLD AUTO: 10.6 FL (ref 8.1–13.5)
POTASSIUM SERPL-SCNC: 3.4 MMOL/L (ref 3.7–5.3)
PROT SERPL-MCNC: 6.9 G/DL (ref 6.6–8.7)
RBC # BLD AUTO: 3.93 M/UL (ref 3.95–5.11)
SODIUM SERPL-SCNC: 147 MMOL/L (ref 136–145)
VIT B12 SERPL-MCNC: 299 PG/ML (ref 232–1245)
WBC OTHER # BLD: 5.9 K/UL (ref 3.5–11.3)

## 2025-04-08 LAB
DEPRECATED S PNEUM5 IGG SER-MCNC: 0.5 U/ML
ENA JO1 IGG SER-ACNC: <0.4 U/ML
ENA SCL70 AB SER IA-ACNC: 0.7 U/ML
ENA SM AB SER-ACNC: 0.9 U/ML
ENA SS-A 60KD AB SER-ACNC: <0.5 U/ML
ENA SS-A IGG SER QL: <0.7 U/ML
ENA SS-B IGG SER IA-ACNC: 0.5 U/ML
RIBOSOMAL P AB SER-ACNC: <1.9 U/ML
RNAP III IGG SERPL IA-ACNC: <0.7 U/ML
U1 SNRNP IGG SER IA-ACNC: 1 U/ML
U1 SNRNP IGG SER IA-ACNC: 1.4 U/ML

## 2025-04-24 ENCOUNTER — HOSPITAL ENCOUNTER (INPATIENT)
Age: 80
LOS: 5 days | Discharge: PSYCHIATRIC OR SUBSTANCE USE FACILITY | DRG: 080 | End: 2025-04-30
Admitting: INTERNAL MEDICINE
Payer: MEDICARE

## 2025-04-24 ENCOUNTER — APPOINTMENT (OUTPATIENT)
Dept: CT IMAGING | Age: 80
DRG: 080 | End: 2025-04-24
Payer: MEDICARE

## 2025-04-24 DIAGNOSIS — R41.0 DELIRIUM: ICD-10-CM

## 2025-04-24 DIAGNOSIS — F03.911 AGITATION DUE TO DEMENTIA (HCC): Primary | ICD-10-CM

## 2025-04-24 LAB
ALBUMIN SERPL-MCNC: 4.5 G/DL (ref 3.5–5.2)
ALBUMIN/GLOB SERPL: 1.6 {RATIO} (ref 1–2.5)
ALP SERPL-CCNC: 72 U/L (ref 35–104)
ALT SERPL-CCNC: 32 U/L (ref 10–35)
AMMONIA PLAS-SCNC: 14 UMOL/L (ref 11–51)
ANION GAP SERPL CALCULATED.3IONS-SCNC: 15 MMOL/L (ref 9–16)
AST SERPL-CCNC: 67 U/L (ref 10–35)
BACTERIA URNS QL MICRO: ABNORMAL
BASOPHILS # BLD: 0.03 K/UL (ref 0–0.2)
BASOPHILS NFR BLD: 1 % (ref 0–2)
BILIRUB DIRECT SERPL-MCNC: 0.2 MG/DL (ref 0–0.2)
BILIRUB INDIRECT SERPL-MCNC: 0.3 MG/DL
BILIRUB SERPL-MCNC: 0.6 MG/DL (ref 0–1.2)
BILIRUB UR QL STRIP: NEGATIVE
BUN SERPL-MCNC: 17 MG/DL (ref 8–23)
CALCIUM SERPL-MCNC: 9.1 MG/DL (ref 8.8–10.2)
CHLORIDE SERPL-SCNC: 104 MMOL/L (ref 98–107)
CLARITY UR: CLEAR
CO2 SERPL-SCNC: 20 MMOL/L (ref 20–31)
COLOR UR: YELLOW
CREAT SERPL-MCNC: 1.1 MG/DL (ref 0.5–0.9)
EOSINOPHIL # BLD: 0.04 K/UL (ref 0–0.44)
EOSINOPHILS RELATIVE PERCENT: 1 % (ref 1–4)
EPI CELLS #/AREA URNS HPF: ABNORMAL /HPF (ref 0–5)
ERYTHROCYTE [DISTWIDTH] IN BLOOD BY AUTOMATED COUNT: 12.8 % (ref 11.8–14.4)
GFR, ESTIMATED: 53 ML/MIN/1.73M2
GLUCOSE SERPL-MCNC: 141 MG/DL (ref 82–115)
GLUCOSE UR STRIP-MCNC: NEGATIVE MG/DL
HCT VFR BLD AUTO: 36.5 % (ref 36.3–47.1)
HGB BLD-MCNC: 12.7 G/DL (ref 11.9–15.1)
HGB UR QL STRIP.AUTO: ABNORMAL
IMM GRANULOCYTES # BLD AUTO: 0.01 K/UL (ref 0–0.3)
IMM GRANULOCYTES NFR BLD: 0 %
KETONES UR STRIP-MCNC: NEGATIVE MG/DL
LEUKOCYTE ESTERASE UR QL STRIP: ABNORMAL
LYMPHOCYTES NFR BLD: 1.38 K/UL (ref 1.1–3.7)
LYMPHOCYTES RELATIVE PERCENT: 22 % (ref 24–43)
MCH RBC QN AUTO: 35.7 PG (ref 25.2–33.5)
MCHC RBC AUTO-ENTMCNC: 34.8 G/DL (ref 28.4–34.8)
MCV RBC AUTO: 102.5 FL (ref 82.6–102.9)
MONOCYTES NFR BLD: 0.42 K/UL (ref 0.1–1.2)
MONOCYTES NFR BLD: 7 % (ref 3–12)
NEUTROPHILS NFR BLD: 70 % (ref 36–65)
NEUTS SEG NFR BLD: 4.35 K/UL (ref 1.5–8.1)
NITRITE UR QL STRIP: NEGATIVE
NRBC BLD-RTO: 0 PER 100 WBC
PH UR STRIP: 5.5 [PH] (ref 5–8)
PLATELET # BLD AUTO: 150 K/UL (ref 138–453)
PMV BLD AUTO: 10.1 FL (ref 8.1–13.5)
POTASSIUM SERPL-SCNC: 3.2 MMOL/L (ref 3.7–5.3)
PROT SERPL-MCNC: 7.2 G/DL (ref 6.6–8.7)
PROT UR STRIP-MCNC: NEGATIVE MG/DL
RBC # BLD AUTO: 3.56 M/UL (ref 3.95–5.11)
RBC #/AREA URNS HPF: ABNORMAL /HPF (ref 0–2)
SODIUM SERPL-SCNC: 139 MMOL/L (ref 136–145)
SP GR UR STRIP: 1.01 (ref 1–1.03)
UROBILINOGEN UR STRIP-ACNC: NORMAL EU/DL (ref 0–1)
WBC #/AREA URNS HPF: ABNORMAL /HPF (ref 0–5)
WBC OTHER # BLD: 6.2 K/UL (ref 3.5–11.3)

## 2025-04-24 PROCEDURE — 80048 BASIC METABOLIC PNL TOTAL CA: CPT

## 2025-04-24 PROCEDURE — 83735 ASSAY OF MAGNESIUM: CPT

## 2025-04-24 PROCEDURE — 99285 EMERGENCY DEPT VISIT HI MDM: CPT

## 2025-04-24 PROCEDURE — 84439 ASSAY OF FREE THYROXINE: CPT

## 2025-04-24 PROCEDURE — 96372 THER/PROPH/DIAG INJ SC/IM: CPT

## 2025-04-24 PROCEDURE — 82140 ASSAY OF AMMONIA: CPT

## 2025-04-24 PROCEDURE — 6360000002 HC RX W HCPCS

## 2025-04-24 PROCEDURE — 70450 CT HEAD/BRAIN W/O DYE: CPT

## 2025-04-24 PROCEDURE — 93005 ELECTROCARDIOGRAM TRACING: CPT | Performed by: EMERGENCY MEDICINE

## 2025-04-24 PROCEDURE — 81001 URINALYSIS AUTO W/SCOPE: CPT

## 2025-04-24 PROCEDURE — 6370000000 HC RX 637 (ALT 250 FOR IP)

## 2025-04-24 PROCEDURE — 84443 ASSAY THYROID STIM HORMONE: CPT

## 2025-04-24 PROCEDURE — 80076 HEPATIC FUNCTION PANEL: CPT

## 2025-04-24 PROCEDURE — 85025 COMPLETE CBC W/AUTO DIFF WBC: CPT

## 2025-04-24 PROCEDURE — 96374 THER/PROPH/DIAG INJ IV PUSH: CPT

## 2025-04-24 RX ORDER — OLANZAPINE 10 MG/2ML
10 INJECTION, POWDER, FOR SOLUTION INTRAMUSCULAR ONCE
Status: COMPLETED | OUTPATIENT
Start: 2025-04-24 | End: 2025-04-24

## 2025-04-24 RX ORDER — LORAZEPAM 2 MG/ML
1 INJECTION INTRAMUSCULAR ONCE
Status: COMPLETED | OUTPATIENT
Start: 2025-04-24 | End: 2025-04-24

## 2025-04-24 RX ORDER — DIVALPROEX SODIUM 250 MG/1
250 TABLET, DELAYED RELEASE ORAL ONCE
Status: COMPLETED | OUTPATIENT
Start: 2025-04-24 | End: 2025-04-24

## 2025-04-24 RX ORDER — OLANZAPINE 10 MG/2ML
5 INJECTION, POWDER, FOR SOLUTION INTRAMUSCULAR ONCE
Status: COMPLETED | OUTPATIENT
Start: 2025-04-24 | End: 2025-04-24

## 2025-04-24 RX ADMIN — DIVALPROEX SODIUM 250 MG: 250 TABLET, DELAYED RELEASE ORAL at 22:34

## 2025-04-24 RX ADMIN — OLANZAPINE 5 MG: 10 INJECTION, POWDER, FOR SOLUTION INTRAMUSCULAR at 18:50

## 2025-04-24 RX ADMIN — OLANZAPINE 10 MG: 10 INJECTION, POWDER, FOR SOLUTION INTRAMUSCULAR at 19:53

## 2025-04-24 RX ADMIN — LORAZEPAM 1 MG: 2 INJECTION INTRAMUSCULAR; INTRAVENOUS at 19:52

## 2025-04-24 NOTE — ED PROVIDER NOTES
[facility]  Emergency Department Encounter  Emergency Medicine Attending     Pt Name:Leticia Tran  MRN: [id number]  Birthdate 1945  Date of evaluation: 4/24/25  PCP:  Neema Hsieh MD  Note Started: 7:57 PM EDT      CHIEF COMPLAINT       Chief Complaint   Patient presents with    Altered Mental Status       HISTORY OF PRESENT ILLNESS  (Location/Symptom, Timing/Onset, Context/Setting, Quality, Duration, Modifying Factors, Severity.)      79-year-old female presenting for evaluation of altered mental status and concern for delirium.  Patient lives alone and has history of dementia.  She has not been taking her medications for quite some time.  Today patient was sitting outside in her bra and underwear according to her landlord and patient was confused.  Patient unable to provide any history due to her mentation abnormality.  History obtained by EMS as well as patient's neighbors and close friends            PAST MEDICAL / SURGICAL / SOCIAL / FAMILY HISTORY      has a past medical history of Autoimmune thyroiditis, Dementia (HCC), and Osteoporosis.     has no past surgical history on file.    Social History     Socioeconomic History    Marital status: Single     Spouse name: Not on file    Number of children: Not on file    Years of education: Not on file    Highest education level: Not on file   Occupational History    Not on file   Tobacco Use    Smoking status: Never    Smokeless tobacco: Never   Substance and Sexual Activity    Alcohol use: Never    Drug use: Never    Sexual activity: Not on file   Other Topics Concern    Not on file   Social History Narrative    Not on file     Social Drivers of Health     Financial Resource Strain: Not on file   Food Insecurity: Not on file   Transportation Needs: Not on file   Physical Activity: Not on file   Stress: Not on file   Social Connections: Not on file   Intimate Partner Violence: Not on file   Housing Stability: Not on file       Family History

## 2025-04-24 NOTE — ED NOTES
Pt at edge of bed wanting to \"get out of here\" Pt redirected. Pt contused with demented like communication. Using word salad taking time to get complete sentences out. Pt fidgeting with cords and linen. Writer and PCA boosted pt up in bed. No furrow or grimace. Does not answer questions regarding pain or needs. Writer explains purewyck and places device.

## 2025-04-24 NOTE — ED NOTES
Family arrives reporting decline since Easter. Pt has not taken any medications for over a year pt refuses. Pt continues to try to get out of bed climbing over side rail. Family request med to help calm patient

## 2025-04-25 PROBLEM — E06.3 AUTOIMMUNE THYROIDITIS: Status: ACTIVE | Noted: 2025-04-25

## 2025-04-25 PROBLEM — E03.5 MYXEDEMA COMA (HCC): Status: ACTIVE | Noted: 2025-04-25

## 2025-04-25 PROBLEM — F03.C11 SEVERE DEMENTIA WITH AGITATION (HCC): Status: ACTIVE | Noted: 2025-04-25

## 2025-04-25 PROBLEM — R41.82 ALTERED MENTAL STATUS, UNSPECIFIED: Status: ACTIVE | Noted: 2025-04-25

## 2025-04-25 PROBLEM — E87.6 HYPOKALEMIA: Status: ACTIVE | Noted: 2025-04-25

## 2025-04-25 PROBLEM — N30.00 ACUTE CYSTITIS WITHOUT HEMATURIA: Status: ACTIVE | Noted: 2025-04-25

## 2025-04-25 PROBLEM — R41.0 DELIRIUM: Status: ACTIVE | Noted: 2025-04-25

## 2025-04-25 PROBLEM — F10.27: Status: ACTIVE | Noted: 2025-04-25

## 2025-04-25 PROBLEM — R33.8 ACUTE URINARY RETENTION: Status: ACTIVE | Noted: 2025-04-25

## 2025-04-25 PROBLEM — F03.911 AGITATION DUE TO DEMENTIA (HCC): Status: ACTIVE | Noted: 2025-04-25

## 2025-04-25 LAB
ANION GAP SERPL CALCULATED.3IONS-SCNC: 15 MMOL/L (ref 9–16)
BASOPHILS # BLD: 0 K/UL (ref 0–0.2)
BASOPHILS NFR BLD: 0 %
BUN SERPL-MCNC: 14 MG/DL (ref 8–23)
CALCIUM SERPL-MCNC: 9.4 MG/DL (ref 8.8–10.2)
CHLORIDE SERPL-SCNC: 106 MMOL/L (ref 98–107)
CO2 SERPL-SCNC: 22 MMOL/L (ref 20–31)
CORTIS SERPL-MCNC: 27.2 UG/DL (ref 2.5–19.5)
CORTISOL COLLECTION INFO: ABNORMAL
CREAT SERPL-MCNC: 1 MG/DL (ref 0.5–0.9)
EKG ATRIAL RATE: 681 BPM
EKG ATRIAL RATE: 78 BPM
EKG P AXIS: 82 DEGREES
EKG P-R INTERVAL: 156 MS
EKG P-R INTERVAL: 212 MS
EKG Q-T INTERVAL: 344 MS
EKG Q-T INTERVAL: 454 MS
EKG QRS DURATION: 100 MS
EKG QRS DURATION: 96 MS
EKG QTC CALCULATION (BAZETT): 371 MS
EKG QTC CALCULATION (BAZETT): 517 MS
EKG R AXIS: 18 DEGREES
EKG R AXIS: 29 DEGREES
EKG T AXIS: -1 DEGREES
EKG T AXIS: 26 DEGREES
EKG VENTRICULAR RATE: 70 BPM
EKG VENTRICULAR RATE: 78 BPM
EOSINOPHIL # BLD: 0 K/UL (ref 0–0.4)
EOSINOPHILS RELATIVE PERCENT: 0 % (ref 1–4)
ERYTHROCYTE [DISTWIDTH] IN BLOOD BY AUTOMATED COUNT: 12.7 % (ref 11.8–14.4)
GFR, ESTIMATED: 56 ML/MIN/1.73M2
GLUCOSE BLD-MCNC: 127 MG/DL (ref 65–105)
GLUCOSE SERPL-MCNC: 134 MG/DL (ref 82–115)
HCT VFR BLD AUTO: 42.4 % (ref 36.3–47.1)
HGB BLD-MCNC: 15.1 G/DL (ref 11.9–15.1)
IMM GRANULOCYTES # BLD AUTO: 0 K/UL (ref 0–0.3)
IMM GRANULOCYTES NFR BLD: 0 %
LYMPHOCYTES NFR BLD: 0.53 K/UL (ref 1–4.8)
LYMPHOCYTES RELATIVE PERCENT: 8 % (ref 24–44)
MAGNESIUM SERPL-MCNC: 2.2 MG/DL (ref 1.6–2.4)
MCH RBC QN AUTO: 36 PG (ref 25.2–33.5)
MCHC RBC AUTO-ENTMCNC: 35.6 G/DL (ref 28.4–34.8)
MCV RBC AUTO: 101 FL (ref 82.6–102.9)
MONOCYTES NFR BLD: 0.2 K/UL (ref 0.2–0.8)
MONOCYTES NFR BLD: 3 % (ref 1–7)
NEUTROPHILS NFR BLD: 89 % (ref 36–66)
NEUTS SEG NFR BLD: 5.87 K/UL (ref 1.8–7.7)
NRBC BLD-RTO: 0 PER 100 WBC
PLATELET # BLD AUTO: 120 K/UL (ref 138–453)
PMV BLD AUTO: 10.4 FL (ref 8.1–13.5)
POTASSIUM SERPL-SCNC: 3.4 MMOL/L (ref 3.7–5.3)
RBC # BLD AUTO: 4.2 M/UL (ref 3.95–5.11)
SODIUM SERPL-SCNC: 143 MMOL/L (ref 136–145)
T4 FREE SERPL-MCNC: <0.1 NG/DL (ref 0.93–1.7)
TSH SERPL DL<=0.05 MIU/L-ACNC: 306 UIU/ML (ref 0.27–4.2)
WBC OTHER # BLD: 6.6 K/UL (ref 3.5–11.3)

## 2025-04-25 PROCEDURE — 93005 ELECTROCARDIOGRAM TRACING: CPT

## 2025-04-25 PROCEDURE — 6360000002 HC RX W HCPCS

## 2025-04-25 PROCEDURE — 82947 ASSAY GLUCOSE BLOOD QUANT: CPT

## 2025-04-25 PROCEDURE — 6360000002 HC RX W HCPCS: Performed by: NURSE PRACTITIONER

## 2025-04-25 PROCEDURE — 87040 BLOOD CULTURE FOR BACTERIA: CPT

## 2025-04-25 PROCEDURE — 2580000003 HC RX 258

## 2025-04-25 PROCEDURE — 85025 COMPLETE CBC W/AUTO DIFF WBC: CPT

## 2025-04-25 PROCEDURE — 82533 TOTAL CORTISOL: CPT

## 2025-04-25 PROCEDURE — 36415 COLL VENOUS BLD VENIPUNCTURE: CPT

## 2025-04-25 PROCEDURE — 93010 ELECTROCARDIOGRAM REPORT: CPT | Performed by: INTERNAL MEDICINE

## 2025-04-25 PROCEDURE — 80048 BASIC METABOLIC PNL TOTAL CA: CPT

## 2025-04-25 PROCEDURE — 1200000000 HC SEMI PRIVATE

## 2025-04-25 PROCEDURE — APPSS45 APP SPLIT SHARED TIME 31-45 MINUTES

## 2025-04-25 PROCEDURE — 2580000003 HC RX 258: Performed by: INTERNAL MEDICINE

## 2025-04-25 PROCEDURE — 2500000003 HC RX 250 WO HCPCS

## 2025-04-25 PROCEDURE — 6360000002 HC RX W HCPCS: Performed by: INTERNAL MEDICINE

## 2025-04-25 PROCEDURE — 99222 1ST HOSP IP/OBS MODERATE 55: CPT | Performed by: STUDENT IN AN ORGANIZED HEALTH CARE EDUCATION/TRAINING PROGRAM

## 2025-04-25 PROCEDURE — 99223 1ST HOSP IP/OBS HIGH 75: CPT | Performed by: INTERNAL MEDICINE

## 2025-04-25 RX ORDER — POTASSIUM CHLORIDE 7.45 MG/ML
10 INJECTION INTRAVENOUS
Status: ACTIVE | OUTPATIENT
Start: 2025-04-25 | End: 2025-04-25

## 2025-04-25 RX ORDER — SODIUM CHLORIDE 9 MG/ML
5 INJECTION, SOLUTION INTRAMUSCULAR; INTRAVENOUS; SUBCUTANEOUS DAILY
Status: DISCONTINUED | OUTPATIENT
Start: 2025-04-25 | End: 2025-04-25

## 2025-04-25 RX ORDER — ONDANSETRON 2 MG/ML
4 INJECTION INTRAMUSCULAR; INTRAVENOUS EVERY 6 HOURS PRN
Status: DISCONTINUED | OUTPATIENT
Start: 2025-04-25 | End: 2025-04-30 | Stop reason: HOSPADM

## 2025-04-25 RX ORDER — POLYETHYLENE GLYCOL 3350 17 G/17G
17 POWDER, FOR SOLUTION ORAL DAILY PRN
Status: DISCONTINUED | OUTPATIENT
Start: 2025-04-25 | End: 2025-04-30 | Stop reason: HOSPADM

## 2025-04-25 RX ORDER — SODIUM CHLORIDE 9 MG/ML
5 INJECTION, SOLUTION INTRAMUSCULAR; INTRAVENOUS; SUBCUTANEOUS DAILY
Status: DISCONTINUED | OUTPATIENT
Start: 2025-04-25 | End: 2025-04-28

## 2025-04-25 RX ORDER — LEVOTHYROXINE SODIUM 20 UG/ML
50 INJECTION, SOLUTION INTRAVENOUS DAILY
Status: DISCONTINUED | OUTPATIENT
Start: 2025-04-25 | End: 2025-04-25

## 2025-04-25 RX ORDER — LORAZEPAM 2 MG/ML
0.5 INJECTION INTRAMUSCULAR ONCE
Status: COMPLETED | OUTPATIENT
Start: 2025-04-25 | End: 2025-04-25

## 2025-04-25 RX ORDER — LIOTHYRONINE SODIUM 10 UG/ML
10 INJECTION, SOLUTION INTRAVENOUS ONCE
Status: DISCONTINUED | OUTPATIENT
Start: 2025-04-25 | End: 2025-04-25

## 2025-04-25 RX ORDER — LIOTHYRONINE SODIUM 10 UG/ML
20 INJECTION, SOLUTION INTRAVENOUS ONCE
Status: DISCONTINUED | OUTPATIENT
Start: 2025-04-25 | End: 2025-04-25

## 2025-04-25 RX ORDER — LEVOTHYROXINE SODIUM 20 UG/ML
50 INJECTION, SOLUTION INTRAVENOUS ONCE
Status: COMPLETED | OUTPATIENT
Start: 2025-04-25 | End: 2025-04-25

## 2025-04-25 RX ORDER — SODIUM CHLORIDE 0.9 % (FLUSH) 0.9 %
5-40 SYRINGE (ML) INJECTION EVERY 12 HOURS SCHEDULED
Status: DISCONTINUED | OUTPATIENT
Start: 2025-04-25 | End: 2025-04-30 | Stop reason: HOSPADM

## 2025-04-25 RX ORDER — DIVALPROEX SODIUM 250 MG/1
250 TABLET, DELAYED RELEASE ORAL EVERY 8 HOURS SCHEDULED
Status: DISCONTINUED | OUTPATIENT
Start: 2025-04-25 | End: 2025-04-26

## 2025-04-25 RX ORDER — LIOTHYRONINE SODIUM 10 UG/ML
10 INJECTION, SOLUTION INTRAVENOUS EVERY 8 HOURS
Status: DISCONTINUED | OUTPATIENT
Start: 2025-04-25 | End: 2025-04-25

## 2025-04-25 RX ORDER — LEVOTHYROXINE SODIUM 20 UG/ML
100 INJECTION, SOLUTION INTRAVENOUS DAILY
Status: DISCONTINUED | OUTPATIENT
Start: 2025-04-26 | End: 2025-04-29

## 2025-04-25 RX ORDER — DIPHENHYDRAMINE HYDROCHLORIDE 50 MG/ML
INJECTION, SOLUTION INTRAMUSCULAR; INTRAVENOUS
Status: COMPLETED
Start: 2025-04-25 | End: 2025-04-25

## 2025-04-25 RX ORDER — ENOXAPARIN SODIUM 100 MG/ML
40 INJECTION SUBCUTANEOUS DAILY
Status: DISCONTINUED | OUTPATIENT
Start: 2025-04-25 | End: 2025-04-30 | Stop reason: HOSPADM

## 2025-04-25 RX ORDER — POTASSIUM CHLORIDE 7.45 MG/ML
10 INJECTION INTRAVENOUS PRN
Status: DISCONTINUED | OUTPATIENT
Start: 2025-04-25 | End: 2025-04-30 | Stop reason: HOSPADM

## 2025-04-25 RX ORDER — LEVOTHYROXINE SODIUM ANHYDROUS 100 UG/5ML
100 INJECTION, POWDER, LYOPHILIZED, FOR SOLUTION INTRAVENOUS DAILY
Status: DISCONTINUED | OUTPATIENT
Start: 2025-04-26 | End: 2025-04-25

## 2025-04-25 RX ORDER — SODIUM CHLORIDE 9 MG/ML
INJECTION, SOLUTION INTRAVENOUS CONTINUOUS
Status: DISCONTINUED | OUTPATIENT
Start: 2025-04-25 | End: 2025-04-30

## 2025-04-25 RX ORDER — LEVOTHYROXINE SODIUM 20 UG/ML
50 INJECTION, SOLUTION INTRAVENOUS DAILY
Status: DISCONTINUED | OUTPATIENT
Start: 2025-04-26 | End: 2025-04-25

## 2025-04-25 RX ORDER — DIPHENHYDRAMINE HYDROCHLORIDE 50 MG/ML
25 INJECTION, SOLUTION INTRAMUSCULAR; INTRAVENOUS ONCE
Status: COMPLETED | OUTPATIENT
Start: 2025-04-25 | End: 2025-04-25

## 2025-04-25 RX ORDER — ONDANSETRON 4 MG/1
4 TABLET, ORALLY DISINTEGRATING ORAL EVERY 8 HOURS PRN
Status: DISCONTINUED | OUTPATIENT
Start: 2025-04-25 | End: 2025-04-30 | Stop reason: HOSPADM

## 2025-04-25 RX ORDER — LEVOTHYROXINE SODIUM ANHYDROUS 500 UG/5ML
50 INJECTION, POWDER, LYOPHILIZED, FOR SOLUTION INTRAVENOUS ONCE
Status: DISCONTINUED | OUTPATIENT
Start: 2025-04-25 | End: 2025-04-25

## 2025-04-25 RX ORDER — LEVOTHYROXINE SODIUM 20 UG/ML
200 INJECTION, SOLUTION INTRAVENOUS ONCE
Status: DISCONTINUED | OUTPATIENT
Start: 2025-04-25 | End: 2025-04-25

## 2025-04-25 RX ORDER — POTASSIUM CHLORIDE 1500 MG/1
40 TABLET, EXTENDED RELEASE ORAL PRN
Status: DISCONTINUED | OUTPATIENT
Start: 2025-04-25 | End: 2025-04-30 | Stop reason: HOSPADM

## 2025-04-25 RX ORDER — MAGNESIUM SULFATE 1 G/100ML
1000 INJECTION INTRAVENOUS PRN
Status: DISCONTINUED | OUTPATIENT
Start: 2025-04-25 | End: 2025-04-30 | Stop reason: HOSPADM

## 2025-04-25 RX ORDER — SODIUM CHLORIDE 9 MG/ML
INJECTION, SOLUTION INTRAVENOUS PRN
Status: DISCONTINUED | OUTPATIENT
Start: 2025-04-25 | End: 2025-04-30 | Stop reason: HOSPADM

## 2025-04-25 RX ORDER — LEVOTHYROXINE SODIUM 20 UG/ML
400 INJECTION, SOLUTION INTRAVENOUS ONCE
Status: DISCONTINUED | OUTPATIENT
Start: 2025-04-25 | End: 2025-04-25

## 2025-04-25 RX ORDER — SODIUM CHLORIDE 0.9 % (FLUSH) 0.9 %
10 SYRINGE (ML) INJECTION PRN
Status: DISCONTINUED | OUTPATIENT
Start: 2025-04-25 | End: 2025-04-30 | Stop reason: HOSPADM

## 2025-04-25 RX ORDER — HYDROCORTISONE SODIUM SUCCINATE 100 MG/2ML
100 INJECTION INTRAMUSCULAR; INTRAVENOUS EVERY 8 HOURS
Status: DISCONTINUED | OUTPATIENT
Start: 2025-04-25 | End: 2025-04-28

## 2025-04-25 RX ORDER — ACETAMINOPHEN 650 MG/1
650 SUPPOSITORY RECTAL EVERY 6 HOURS PRN
Status: DISCONTINUED | OUTPATIENT
Start: 2025-04-25 | End: 2025-04-30 | Stop reason: HOSPADM

## 2025-04-25 RX ORDER — LIOTHYRONINE SODIUM 10 UG/ML
5 INJECTION, SOLUTION INTRAVENOUS EVERY 8 HOURS
Status: DISCONTINUED | OUTPATIENT
Start: 2025-04-25 | End: 2025-04-25

## 2025-04-25 RX ORDER — ACETAMINOPHEN 325 MG/1
650 TABLET ORAL EVERY 6 HOURS PRN
Status: DISCONTINUED | OUTPATIENT
Start: 2025-04-25 | End: 2025-04-30 | Stop reason: HOSPADM

## 2025-04-25 RX ORDER — DONEPEZIL HYDROCHLORIDE 10 MG/1
10 TABLET, FILM COATED ORAL NIGHTLY
Status: DISCONTINUED | OUTPATIENT
Start: 2025-04-25 | End: 2025-04-30 | Stop reason: HOSPADM

## 2025-04-25 RX ADMIN — HYDROCORTISONE SODIUM SUCCINATE 100 MG: 100 INJECTION, POWDER, FOR SOLUTION INTRAMUSCULAR; INTRAVENOUS at 03:18

## 2025-04-25 RX ADMIN — SODIUM CHLORIDE: 9 INJECTION, SOLUTION INTRAVENOUS at 16:41

## 2025-04-25 RX ADMIN — DIPHENHYDRAMINE HYDROCHLORIDE 25 MG: 50 INJECTION INTRAMUSCULAR; INTRAVENOUS at 03:17

## 2025-04-25 RX ADMIN — DIPHENHYDRAMINE HYDROCHLORIDE 25 MG: 50 INJECTION, SOLUTION INTRAMUSCULAR; INTRAVENOUS at 03:17

## 2025-04-25 RX ADMIN — SODIUM CHLORIDE: 9 INJECTION, SOLUTION INTRAVENOUS at 06:43

## 2025-04-25 RX ADMIN — SODIUM CHLORIDE, PRESERVATIVE FREE 5 ML: 5 INJECTION INTRAVENOUS at 13:09

## 2025-04-25 RX ADMIN — ENOXAPARIN SODIUM 40 MG: 100 INJECTION SUBCUTANEOUS at 11:19

## 2025-04-25 RX ADMIN — HYDROCORTISONE SODIUM SUCCINATE 100 MG: 100 INJECTION, POWDER, FOR SOLUTION INTRAMUSCULAR; INTRAVENOUS at 11:19

## 2025-04-25 RX ADMIN — POTASSIUM CHLORIDE 10 MEQ: 7.46 INJECTION, SOLUTION INTRAVENOUS at 11:23

## 2025-04-25 RX ADMIN — POTASSIUM CHLORIDE 10 MEQ: 7.46 INJECTION, SOLUTION INTRAVENOUS at 13:14

## 2025-04-25 RX ADMIN — WATER 1000 MG: 1 INJECTION INTRAMUSCULAR; INTRAVENOUS; SUBCUTANEOUS at 01:53

## 2025-04-25 RX ADMIN — POTASSIUM CHLORIDE 10 MEQ: 7.46 INJECTION, SOLUTION INTRAVENOUS at 14:17

## 2025-04-25 RX ADMIN — POTASSIUM CHLORIDE 10 MEQ: 7.46 INJECTION, SOLUTION INTRAVENOUS at 12:14

## 2025-04-25 RX ADMIN — LORAZEPAM 0.5 MG: 2 INJECTION INTRAMUSCULAR; INTRAVENOUS at 22:18

## 2025-04-25 RX ADMIN — HYDROCORTISONE SODIUM SUCCINATE 100 MG: 100 INJECTION, POWDER, FOR SOLUTION INTRAMUSCULAR; INTRAVENOUS at 20:35

## 2025-04-25 RX ADMIN — LEVOTHYROXINE SODIUM 50 MCG: 20 INJECTION, SOLUTION INTRAVENOUS at 03:58

## 2025-04-25 RX ADMIN — LEVOTHYROXINE SODIUM 50 MCG: 20 INJECTION, SOLUTION INTRAVENOUS at 13:08

## 2025-04-25 RX ADMIN — SODIUM CHLORIDE, PRESERVATIVE FREE 10 ML: 5 INJECTION INTRAVENOUS at 20:35

## 2025-04-25 NOTE — CARE COORDINATION
Case Management Assessment  Initial Evaluation    Date/Time of Evaluation: 4/25/2025 12:54 PM  Assessment Completed by: Nicole Hutchison    If patient is discharged prior to next notation, then this note serves as note for discharge by case management.    Patient Name: Leticia Tran                   YOB: 1945  Diagnosis: Delirium [R41.0]  Altered mental status, unspecified [R41.82]  Agitation due to dementia (HCC) [F03.911]  Severe dementia associated with alcoholism, with agitation (HCC) [F10.27]                   Date / Time: 4/24/2025  6:20 PM    Patient Admission Status: Inpatient   Readmission Risk (Low < 19, Mod (19-27), High > 27): Readmission Risk Score: 10.1    Current PCP: Neema Hsieh MD  PCP verified by CM? Yes    Chart Reviewed: Yes      History Provided by: Other (see comment) (Nabila JIMÉNEZ)  Patient Orientation: Other (see comment) (Alzheimers, AMS)    Patient Cognition: Other (see comment) (Alzheimers, AMS)    Hospitalization in the last 30 days (Readmission):  No    If yes, Readmission Assessment in CM Navigator will be completed.    Advance Directives:      Code Status: Full Code   Patient's Primary Decision Maker is: Named in Scanned ACP Document    Primary Decision Maker: Nabila Ramirez - Other - [phone]    Discharge Planning:    Patient lives with: Alone Type of Home: Other (Comment) (Lower level Duplex)  Primary Care Giver: Other (Comment) (Patient has assistance coming into the home.  Lives alone)  Patient Support Systems include: Friends/Neighbors   Current Financial resources: Medicare  Current community resources: Other (Comment) (Area office on aging)  Current services prior to admission: None            Current DME:              Type of Home Care services:  None    ADLS  Prior functional level: Assistance with the following:, Bathing, Dressing, Housework, Shopping  Current functional level: Assistance with the following:, Bathing, Dressing, Housework, Shopping    PT

## 2025-04-25 NOTE — ED NOTES
Pt climbing out of bed pulling on cardiac leads cussing at staff. Repositioned and redirection tried.

## 2025-04-25 NOTE — ED NOTES
Straight cath with 1000 ml instant return, clear dark straw colored urine. Pt tolerated well for current state.

## 2025-04-25 NOTE — ED NOTES
Pt less combative, continues with restlessness and cussing. Pt does take depakote in applesauce after spitting out x1.

## 2025-04-25 NOTE — H&P
[facility]  Office: [phone]  Carlos Shrestha DO, Krzysztof Castro DO, Jarret Gonzalez DO, Nitin Stout DO, Joaquim Lindquist MD, Joyce Pacheco MD, Klever Zamora MD, Crissy Love MD,  Travon Shetty MD, Payam Car MD, Anup Mart MD,  Piotr Hernandez DO, Lavonne Mario MD, Yordan Singleton MD, Nehemiah Shrestha DO, Vi Iraheta MD,  Paul Beckwith DO, Prisca Lopez MD, Sandi Castro MD, Yvonne Cobos MD, Kayli Friedman MD,  Karthikeyan Carney MD, Karlos Snider MD, Demario Sawyer MD, Migdalia Guzman MD, Vito Benitez MD, Aziza Phelps MD, Samir Ariraza DO, Gunnar Carrion DO, Aiyana Pate MD,  Zion Bhakta MD, Shirley Waterhouse, CNP,  Loretta Shabazz, CNP, Connor Moore, CNP,  Lorene Segal, DNP, Lisa Pandey, CNP, Leslie Hernandez, CNP, Norma Zuniga CNP, Margy Tan, CNP, Natalia Maurice, CNP, Adilene Cruz, PA-C, Dianne Santa, PA-C, Sarah Vivas, CNP, Ritu Silvestre, CNP, Micaela Cid, CNP, Fallon Garland, CNS, Huma Mckeon, CNP, Lisa Infante, CNP, Tracy Schwab, CNP         [facility]   IN-PATIENT SERVICE   [facility]    HISTORY AND PHYSICAL EXAMINATION            Date:   4/25/2025  Patient name:  Leticia Tran  Date of admission:  4/24/2025  6:20 PM  MRN:   [id number]  Account:  [id number]  YOB: 1945  PCP:    Neema Hsieh MD  Room:   [place] [address]  Code Status:    Full Code    Chief Complaint:     Chief Complaint   Patient presents with    Altered Mental Status       History Obtained From:     electronic medical record    History of Present Illness:     Leticia Tran is a 79 y.o. Non- / non  female who presents with Altered Mental Status   and is admitted to the hospital for the management of Altered mental status, unspecified.    Patient with a past medical history significant for autoimmune thyroiditis, dementia and osteoporosis presents emergency department after neighbors found her wandering outside in her bra

## 2025-04-26 LAB
ANION GAP SERPL CALCULATED.3IONS-SCNC: 13 MMOL/L (ref 9–16)
BASOPHILS # BLD: 0 K/UL (ref 0–0.2)
BASOPHILS NFR BLD: 0 %
BUN SERPL-MCNC: 10 MG/DL (ref 8–23)
CALCIUM SERPL-MCNC: 8.7 MG/DL (ref 8.8–10.2)
CHLORIDE SERPL-SCNC: 110 MMOL/L (ref 98–107)
CO2 SERPL-SCNC: 19 MMOL/L (ref 20–31)
CREAT SERPL-MCNC: 0.9 MG/DL (ref 0.5–0.9)
EOSINOPHIL # BLD: 0 K/UL (ref 0–0.4)
EOSINOPHILS RELATIVE PERCENT: 0 % (ref 1–4)
ERYTHROCYTE [DISTWIDTH] IN BLOOD BY AUTOMATED COUNT: 12.5 % (ref 11.8–14.4)
GFR, ESTIMATED: 69 ML/MIN/1.73M2
GLUCOSE SERPL-MCNC: 115 MG/DL (ref 82–115)
HCT VFR BLD AUTO: 42.1 % (ref 36.3–47.1)
HGB BLD-MCNC: 14.6 G/DL (ref 11.9–15.1)
IMM GRANULOCYTES # BLD AUTO: 0.08 K/UL (ref 0–0.3)
IMM GRANULOCYTES NFR BLD: 1 %
LYMPHOCYTES NFR BLD: 0.42 K/UL (ref 1–4.8)
LYMPHOCYTES RELATIVE PERCENT: 5 % (ref 24–44)
MCH RBC QN AUTO: 36.1 PG (ref 25.2–33.5)
MCHC RBC AUTO-ENTMCNC: 34.7 G/DL (ref 28.4–34.8)
MCV RBC AUTO: 104.2 FL (ref 82.6–102.9)
MONOCYTES NFR BLD: 0.25 K/UL (ref 0.2–0.8)
MONOCYTES NFR BLD: 3 % (ref 1–7)
NEUTROPHILS NFR BLD: 91 % (ref 36–66)
NEUTS SEG NFR BLD: 7.65 K/UL (ref 1.8–7.7)
NRBC BLD-RTO: 0 PER 100 WBC
PLATELET # BLD AUTO: 150 K/UL (ref 138–453)
PMV BLD AUTO: 10.1 FL (ref 8.1–13.5)
POTASSIUM SERPL-SCNC: 3.6 MMOL/L (ref 3.7–5.3)
RBC # BLD AUTO: 4.04 M/UL (ref 3.95–5.11)
SODIUM SERPL-SCNC: 141 MMOL/L (ref 136–145)
WBC OTHER # BLD: 8.4 K/UL (ref 3.5–11.3)

## 2025-04-26 PROCEDURE — 2580000003 HC RX 258: Performed by: INTERNAL MEDICINE

## 2025-04-26 PROCEDURE — 80048 BASIC METABOLIC PNL TOTAL CA: CPT

## 2025-04-26 PROCEDURE — 2500000003 HC RX 250 WO HCPCS

## 2025-04-26 PROCEDURE — 6360000002 HC RX W HCPCS: Performed by: NURSE PRACTITIONER

## 2025-04-26 PROCEDURE — 6360000002 HC RX W HCPCS: Performed by: INTERNAL MEDICINE

## 2025-04-26 PROCEDURE — 6370000000 HC RX 637 (ALT 250 FOR IP)

## 2025-04-26 PROCEDURE — 85025 COMPLETE CBC W/AUTO DIFF WBC: CPT

## 2025-04-26 PROCEDURE — 6360000002 HC RX W HCPCS

## 2025-04-26 PROCEDURE — 1200000000 HC SEMI PRIVATE

## 2025-04-26 PROCEDURE — 36415 COLL VENOUS BLD VENIPUNCTURE: CPT

## 2025-04-26 PROCEDURE — 99232 SBSQ HOSP IP/OBS MODERATE 35: CPT | Performed by: INTERNAL MEDICINE

## 2025-04-26 PROCEDURE — 94761 N-INVAS EAR/PLS OXIMETRY MLT: CPT

## 2025-04-26 PROCEDURE — 2580000003 HC RX 258

## 2025-04-26 RX ORDER — LORAZEPAM 2 MG/ML
0.5 INJECTION INTRAMUSCULAR ONCE
Status: COMPLETED | OUTPATIENT
Start: 2025-04-26 | End: 2025-04-26

## 2025-04-26 RX ORDER — OLANZAPINE 10 MG/2ML
5 INJECTION, POWDER, FOR SOLUTION INTRAMUSCULAR EVERY 12 HOURS PRN
Status: COMPLETED | OUTPATIENT
Start: 2025-04-26 | End: 2025-04-26

## 2025-04-26 RX ADMIN — DONEPEZIL HYDROCHLORIDE 10 MG: 10 TABLET, FILM COATED ORAL at 21:57

## 2025-04-26 RX ADMIN — LORAZEPAM 0.5 MG: 2 INJECTION INTRAMUSCULAR; INTRAVENOUS at 00:41

## 2025-04-26 RX ADMIN — SODIUM CHLORIDE: 9 INJECTION, SOLUTION INTRAVENOUS at 02:54

## 2025-04-26 RX ADMIN — OLANZAPINE 5 MG: 10 INJECTION, POWDER, FOR SOLUTION INTRAMUSCULAR at 21:44

## 2025-04-26 RX ADMIN — SODIUM CHLORIDE: 9 INJECTION, SOLUTION INTRAVENOUS at 12:40

## 2025-04-26 RX ADMIN — HYDROCORTISONE SODIUM SUCCINATE 100 MG: 100 INJECTION, POWDER, FOR SOLUTION INTRAMUSCULAR; INTRAVENOUS at 09:07

## 2025-04-26 RX ADMIN — SODIUM CHLORIDE, PRESERVATIVE FREE 10 ML: 5 INJECTION INTRAVENOUS at 09:07

## 2025-04-26 RX ADMIN — HYDROCORTISONE SODIUM SUCCINATE 100 MG: 100 INJECTION, POWDER, FOR SOLUTION INTRAMUSCULAR; INTRAVENOUS at 23:18

## 2025-04-26 RX ADMIN — SODIUM CHLORIDE, PRESERVATIVE FREE 5 ML: 5 INJECTION INTRAVENOUS at 09:08

## 2025-04-26 RX ADMIN — ENOXAPARIN SODIUM 40 MG: 100 INJECTION SUBCUTANEOUS at 09:07

## 2025-04-26 RX ADMIN — HYDROCORTISONE SODIUM SUCCINATE 100 MG: 100 INJECTION, POWDER, FOR SOLUTION INTRAMUSCULAR; INTRAVENOUS at 03:00

## 2025-04-26 RX ADMIN — LEVOTHYROXINE SODIUM 100 MCG: 20 INJECTION, SOLUTION INTRAVENOUS at 09:08

## 2025-04-26 NOTE — FLOWSHEET NOTE
Writer messaged NP Calfee r/t pt increased agitation and pulling off telemetry stickers along with pulling at pablo and IV. Ok to be off telemetry monitoring. One time dose of ativan ordered.    04/25/25 2134   Treatment Team Notification   Reason for Communication Review case   Name of Team Member Notified J Arelis   Treatment Team Role Advanced Practice Nurse   Method of Communication Secure Message   Response See orders   Notification Time 2134

## 2025-04-26 NOTE — CARE COORDINATION
Social worK not able to get a therapy session yet. Will need PT/OT for snf approval. Will need lovely and Rx also. A precert prior to discharge. Lorene lowe

## 2025-04-26 NOTE — FLOWSHEET NOTE
Writer messaged NP Calfee r/t pt pulling at pablo and IV. Order received for additional one time dose of ativan.      04/26/25 0022   Treatment Team Notification   Reason for Communication Review case   Name of Team Member Notified J Arelis   Treatment Team Role Advanced Practice Nurse   Method of Communication Secure Message   Response See orders   Notification Time 0022

## 2025-04-26 NOTE — FLOWSHEET NOTE
Writer contacted NP Calfee r/t restlessness and agitation with renewed pulling of pablo. Order received for PO melatonin. Pt has not been alert enough for PO meds throughout shift. No additional orders at this time.     04/26/25 0402   Treatment Team Notification   Reason for Communication Review case   Name of Team Member Notified J Arelis   Treatment Team Role Advanced Practice Nurse   Method of Communication Secure Message   Response Waiting for response   Notification Time 0402

## 2025-04-27 LAB
ANION GAP SERPL CALCULATED.3IONS-SCNC: 15 MMOL/L (ref 9–16)
BASOPHILS # BLD: 0 K/UL (ref 0–0.2)
BASOPHILS NFR BLD: 0 % (ref 0–2)
BUN SERPL-MCNC: 12 MG/DL (ref 8–23)
CALCIUM SERPL-MCNC: 9.1 MG/DL (ref 8.8–10.2)
CHLORIDE SERPL-SCNC: 107 MMOL/L (ref 98–107)
CO2 SERPL-SCNC: 22 MMOL/L (ref 20–31)
CREAT SERPL-MCNC: 0.9 MG/DL (ref 0.5–0.9)
EOSINOPHIL # BLD: 0 K/UL (ref 0–0.44)
EOSINOPHILS RELATIVE PERCENT: 0 % (ref 1–4)
ERYTHROCYTE [DISTWIDTH] IN BLOOD BY AUTOMATED COUNT: 12.6 % (ref 11.8–14.4)
GFR, ESTIMATED: 62 ML/MIN/1.73M2
GLUCOSE SERPL-MCNC: 129 MG/DL (ref 82–115)
HCT VFR BLD AUTO: 44 % (ref 36.3–47.1)
HGB BLD-MCNC: 15.4 G/DL (ref 11.9–15.1)
IMM GRANULOCYTES # BLD AUTO: 0.12 K/UL (ref 0–0.3)
IMM GRANULOCYTES NFR BLD: 1 %
LYMPHOCYTES NFR BLD: 0.48 K/UL (ref 1.1–3.7)
LYMPHOCYTES RELATIVE PERCENT: 4 % (ref 24–43)
MCH RBC QN AUTO: 35.7 PG (ref 25.2–33.5)
MCHC RBC AUTO-ENTMCNC: 35 G/DL (ref 28.4–34.8)
MCV RBC AUTO: 102.1 FL (ref 82.6–102.9)
MONOCYTES NFR BLD: 0.48 K/UL (ref 0.1–1.2)
MONOCYTES NFR BLD: 4 % (ref 3–12)
NEUTROPHILS NFR BLD: 91 % (ref 36–65)
NEUTS SEG NFR BLD: 10.92 K/UL (ref 1.5–8.1)
NRBC BLD-RTO: 0 PER 100 WBC
PLATELET # BLD AUTO: 149 K/UL (ref 138–453)
PMV BLD AUTO: 10.2 FL (ref 8.1–13.5)
POTASSIUM SERPL-SCNC: 3.4 MMOL/L (ref 3.7–5.3)
RBC # BLD AUTO: 4.31 M/UL (ref 3.95–5.11)
SODIUM SERPL-SCNC: 144 MMOL/L (ref 136–145)
WBC OTHER # BLD: 12 K/UL (ref 3.5–11.3)

## 2025-04-27 PROCEDURE — 97162 PT EVAL MOD COMPLEX 30 MIN: CPT

## 2025-04-27 PROCEDURE — 80048 BASIC METABOLIC PNL TOTAL CA: CPT

## 2025-04-27 PROCEDURE — 36415 COLL VENOUS BLD VENIPUNCTURE: CPT

## 2025-04-27 PROCEDURE — 85025 COMPLETE CBC W/AUTO DIFF WBC: CPT

## 2025-04-27 PROCEDURE — 6360000002 HC RX W HCPCS

## 2025-04-27 PROCEDURE — 6360000002 HC RX W HCPCS: Performed by: NURSE PRACTITIONER

## 2025-04-27 PROCEDURE — 2580000003 HC RX 258: Performed by: INTERNAL MEDICINE

## 2025-04-27 PROCEDURE — 1200000000 HC SEMI PRIVATE

## 2025-04-27 PROCEDURE — 99232 SBSQ HOSP IP/OBS MODERATE 35: CPT | Performed by: INTERNAL MEDICINE

## 2025-04-27 PROCEDURE — 6360000002 HC RX W HCPCS: Performed by: INTERNAL MEDICINE

## 2025-04-27 PROCEDURE — 2500000003 HC RX 250 WO HCPCS

## 2025-04-27 PROCEDURE — 97166 OT EVAL MOD COMPLEX 45 MIN: CPT

## 2025-04-27 PROCEDURE — 93005 ELECTROCARDIOGRAM TRACING: CPT | Performed by: NURSE PRACTITIONER

## 2025-04-27 PROCEDURE — 2580000003 HC RX 258

## 2025-04-27 RX ORDER — HALOPERIDOL 5 MG/ML
2 INJECTION INTRAMUSCULAR ONCE
Status: COMPLETED | OUTPATIENT
Start: 2025-04-27 | End: 2025-04-27

## 2025-04-27 RX ORDER — OLANZAPINE 10 MG/2ML
5 INJECTION, POWDER, FOR SOLUTION INTRAMUSCULAR
Status: COMPLETED | OUTPATIENT
Start: 2025-04-27 | End: 2025-04-27

## 2025-04-27 RX ADMIN — LEVOTHYROXINE SODIUM 100 MCG: 20 INJECTION, SOLUTION INTRAVENOUS at 10:31

## 2025-04-27 RX ADMIN — ENOXAPARIN SODIUM 40 MG: 100 INJECTION SUBCUTANEOUS at 10:31

## 2025-04-27 RX ADMIN — HYDROCORTISONE SODIUM SUCCINATE 100 MG: 100 INJECTION, POWDER, FOR SOLUTION INTRAMUSCULAR; INTRAVENOUS at 07:06

## 2025-04-27 RX ADMIN — HALOPERIDOL LACTATE 2 MG: 5 INJECTION, SOLUTION INTRAMUSCULAR at 04:05

## 2025-04-27 RX ADMIN — SODIUM CHLORIDE: 9 INJECTION, SOLUTION INTRAVENOUS at 23:50

## 2025-04-27 RX ADMIN — SODIUM CHLORIDE, PRESERVATIVE FREE 5 ML: 5 INJECTION INTRAVENOUS at 10:31

## 2025-04-27 RX ADMIN — HYDROCORTISONE SODIUM SUCCINATE 100 MG: 100 INJECTION, POWDER, FOR SOLUTION INTRAMUSCULAR; INTRAVENOUS at 23:10

## 2025-04-27 RX ADMIN — HYDROCORTISONE SODIUM SUCCINATE 100 MG: 100 INJECTION, POWDER, FOR SOLUTION INTRAMUSCULAR; INTRAVENOUS at 16:49

## 2025-04-27 RX ADMIN — OLANZAPINE 5 MG: 10 INJECTION, POWDER, FOR SOLUTION INTRAMUSCULAR at 00:47

## 2025-04-27 RX ADMIN — SODIUM CHLORIDE, PRESERVATIVE FREE 10 ML: 5 INJECTION INTRAVENOUS at 23:11

## 2025-04-28 LAB — GLUCOSE BLD-MCNC: 120 MG/DL (ref 65–105)

## 2025-04-28 PROCEDURE — 93010 ELECTROCARDIOGRAM REPORT: CPT | Performed by: INTERNAL MEDICINE

## 2025-04-28 PROCEDURE — 6370000000 HC RX 637 (ALT 250 FOR IP)

## 2025-04-28 PROCEDURE — 82947 ASSAY GLUCOSE BLOOD QUANT: CPT

## 2025-04-28 PROCEDURE — 6360000002 HC RX W HCPCS

## 2025-04-28 PROCEDURE — 6360000002 HC RX W HCPCS: Performed by: INTERNAL MEDICINE

## 2025-04-28 PROCEDURE — 1200000000 HC SEMI PRIVATE

## 2025-04-28 PROCEDURE — 6360000002 HC RX W HCPCS: Performed by: NURSE PRACTITIONER

## 2025-04-28 PROCEDURE — 2580000003 HC RX 258: Performed by: INTERNAL MEDICINE

## 2025-04-28 PROCEDURE — 99232 SBSQ HOSP IP/OBS MODERATE 35: CPT | Performed by: INTERNAL MEDICINE

## 2025-04-28 RX ORDER — LANOLIN ALCOHOL/MO/W.PET/CERES
1000 CREAM (GRAM) TOPICAL DAILY
Status: DISCONTINUED | OUTPATIENT
Start: 2025-04-28 | End: 2025-04-30 | Stop reason: HOSPADM

## 2025-04-28 RX ORDER — VITAMIN B COMPLEX
1000 TABLET ORAL DAILY
Status: DISCONTINUED | OUTPATIENT
Start: 2025-04-28 | End: 2025-04-30 | Stop reason: HOSPADM

## 2025-04-28 RX ORDER — DIPHENHYDRAMINE HYDROCHLORIDE 50 MG/ML
12.5 INJECTION, SOLUTION INTRAMUSCULAR; INTRAVENOUS ONCE
Status: COMPLETED | OUTPATIENT
Start: 2025-04-29 | End: 2025-04-28

## 2025-04-28 RX ORDER — PAROXETINE 20 MG/1
20 TABLET, FILM COATED ORAL DAILY
Status: DISCONTINUED | OUTPATIENT
Start: 2025-04-28 | End: 2025-04-30

## 2025-04-28 RX ADMIN — DONEPEZIL HYDROCHLORIDE 10 MG: 10 TABLET, FILM COATED ORAL at 19:51

## 2025-04-28 RX ADMIN — SODIUM CHLORIDE: 9 INJECTION, SOLUTION INTRAVENOUS at 09:30

## 2025-04-28 RX ADMIN — LEVOTHYROXINE SODIUM 100 MCG: 20 INJECTION, SOLUTION INTRAVENOUS at 09:31

## 2025-04-28 RX ADMIN — ENOXAPARIN SODIUM 40 MG: 100 INJECTION SUBCUTANEOUS at 16:19

## 2025-04-28 RX ADMIN — SODIUM CHLORIDE: 9 INJECTION, SOLUTION INTRAVENOUS at 19:39

## 2025-04-28 RX ADMIN — HYDROCORTISONE SODIUM SUCCINATE 100 MG: 100 INJECTION, POWDER, FOR SOLUTION INTRAMUSCULAR; INTRAVENOUS at 06:43

## 2025-04-28 RX ADMIN — DIPHENHYDRAMINE HYDROCHLORIDE 12.5 MG: 50 INJECTION INTRAMUSCULAR; INTRAVENOUS at 23:38

## 2025-04-28 RX ADMIN — SODIUM CHLORIDE, PRESERVATIVE FREE 5 ML: 5 INJECTION INTRAVENOUS at 09:33

## 2025-04-28 NOTE — ACP (ADVANCE CARE PLANNING)
..Advance Care Planning     Palliative Team Advance Care Planning (ACP) Conversation    Date of Conversation: 04/28/25    Individuals present for the conversation: Granddaughter Nabila and I talk in the phone as she is the appointed primary decision maker.      ACP documents on file prior to discussion:  -Power of  for Healthcare  -Living Will    Previously completed document/s not on file: Not discussed.    Healthcare Decision Maker:    Primary Decision Maker: Nabila Ramirez - Grandchild - [phone]     Conversation Summary:  The Granddaughter Nabila is wanting her Grandmother placed in the [employer] where she works. She is looking at skilled care.      Resuscitation Status:   Code Status: DNR-CC     Documentation Completed:  -No new documents completed.      Kimberly Jhaveri RN

## 2025-04-28 NOTE — CARE COORDINATION
DC Planning    Discussed in IDR-pt lives alone, Nabila is POA and had been working on guardianship per report. Pt is 1:1 may need [facility] psych. Palliative to see.

## 2025-04-29 LAB
EKG Q-T INTERVAL: 436 MS
EKG QRS DURATION: 94 MS
EKG QTC CALCULATION (BAZETT): 467 MS
EKG R AXIS: 19 DEGREES
EKG T AXIS: -4 DEGREES
EKG VENTRICULAR RATE: 69 BPM

## 2025-04-29 PROCEDURE — 97168 OT RE-EVAL EST PLAN CARE: CPT

## 2025-04-29 PROCEDURE — 1200000000 HC SEMI PRIVATE

## 2025-04-29 PROCEDURE — 97164 PT RE-EVAL EST PLAN CARE: CPT

## 2025-04-29 PROCEDURE — 97110 THERAPEUTIC EXERCISES: CPT

## 2025-04-29 PROCEDURE — 97530 THERAPEUTIC ACTIVITIES: CPT

## 2025-04-29 PROCEDURE — 6360000002 HC RX W HCPCS

## 2025-04-29 PROCEDURE — 99222 1ST HOSP IP/OBS MODERATE 55: CPT | Performed by: NURSE PRACTITIONER

## 2025-04-29 PROCEDURE — 6360000002 HC RX W HCPCS: Performed by: INTERNAL MEDICINE

## 2025-04-29 PROCEDURE — 6360000002 HC RX W HCPCS: Performed by: NURSE PRACTITIONER

## 2025-04-29 PROCEDURE — 6370000000 HC RX 637 (ALT 250 FOR IP): Performed by: INTERNAL MEDICINE

## 2025-04-29 PROCEDURE — 97535 SELF CARE MNGMENT TRAINING: CPT

## 2025-04-29 PROCEDURE — 2500000003 HC RX 250 WO HCPCS

## 2025-04-29 PROCEDURE — 99232 SBSQ HOSP IP/OBS MODERATE 35: CPT | Performed by: INTERNAL MEDICINE

## 2025-04-29 RX ORDER — OLANZAPINE 10 MG/2ML
5 INJECTION, POWDER, FOR SOLUTION INTRAMUSCULAR
Status: COMPLETED | OUTPATIENT
Start: 2025-04-29 | End: 2025-04-29

## 2025-04-29 RX ORDER — OLANZAPINE 10 MG/2ML
2.5 INJECTION, POWDER, FOR SOLUTION INTRAMUSCULAR ONCE
Status: COMPLETED | OUTPATIENT
Start: 2025-04-29 | End: 2025-04-29

## 2025-04-29 RX ORDER — OLANZAPINE 10 MG/2ML
5 INJECTION, POWDER, FOR SOLUTION INTRAMUSCULAR ONCE
Status: COMPLETED | OUTPATIENT
Start: 2025-04-29 | End: 2025-04-29

## 2025-04-29 RX ADMIN — OLANZAPINE 2.5 MG: 10 INJECTION, POWDER, FOR SOLUTION INTRAMUSCULAR at 01:33

## 2025-04-29 RX ADMIN — OLANZAPINE 5 MG: 10 INJECTION, POWDER, FOR SOLUTION INTRAMUSCULAR at 10:03

## 2025-04-29 RX ADMIN — ENOXAPARIN SODIUM 40 MG: 100 INJECTION SUBCUTANEOUS at 10:10

## 2025-04-29 RX ADMIN — CYANOCOBALAMIN TAB 1000 MCG 1000 MCG: 1000 TAB at 09:58

## 2025-04-29 RX ADMIN — OLANZAPINE 5 MG: 10 INJECTION, POWDER, FOR SOLUTION INTRAMUSCULAR at 20:40

## 2025-04-29 RX ADMIN — SODIUM CHLORIDE, PRESERVATIVE FREE 10 ML: 5 INJECTION INTRAVENOUS at 09:58

## 2025-04-29 RX ADMIN — Medication 1000 UNITS: at 09:58

## 2025-04-29 RX ADMIN — LEVOTHYROXINE SODIUM 137 MCG: 0.11 TABLET ORAL at 10:09

## 2025-04-29 RX ADMIN — PAROXETINE HYDROCHLORIDE 20 MG: 20 TABLET, FILM COATED ORAL at 09:58
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
## 2025-04-29 NOTE — CARE COORDINATION
Social Work[facility] is following. Once patient is sitter free, they will consider patient. Laurita

## 2025-04-30 ENCOUNTER — HOSPITAL ENCOUNTER (INPATIENT)
Age: 80
LOS: 20 days | Discharge: NURSING, REHAB OR LONG-TERM CARE | DRG: 884 | End: 2025-05-20
Attending: PSYCHIATRY & NEUROLOGY | Admitting: PSYCHIATRY & NEUROLOGY
Payer: MEDICARE

## 2025-04-30 VITALS
WEIGHT: 158.6 LBS | BODY MASS INDEX: 26.42 KG/M2 | HEIGHT: 65 IN | TEMPERATURE: 97.9 F | OXYGEN SATURATION: 99 % | DIASTOLIC BLOOD PRESSURE: 59 MMHG | RESPIRATION RATE: 16 BRPM | SYSTOLIC BLOOD PRESSURE: 152 MMHG | HEART RATE: 46 BPM

## 2025-04-30 PROBLEM — F03.918 DEMENTIA WITH BEHAVIORAL DISTURBANCE (HCC): Status: ACTIVE | Noted: 2025-04-30

## 2025-04-30 LAB
MICROORGANISM SPEC CULT: NORMAL
MICROORGANISM SPEC CULT: NORMAL
SERVICE CMNT-IMP: NORMAL
SERVICE CMNT-IMP: NORMAL
SPECIMEN DESCRIPTION: NORMAL
SPECIMEN DESCRIPTION: NORMAL

## 2025-04-30 PROCEDURE — 99239 HOSP IP/OBS DSCHRG MGMT >30: CPT | Performed by: INTERNAL MEDICINE

## 2025-04-30 PROCEDURE — 6370000000 HC RX 637 (ALT 250 FOR IP): Performed by: INTERNAL MEDICINE

## 2025-04-30 PROCEDURE — 1240000000 HC EMOTIONAL WELLNESS R&B

## 2025-04-30 PROCEDURE — 97530 THERAPEUTIC ACTIVITIES: CPT

## 2025-04-30 RX ORDER — TRAZODONE HYDROCHLORIDE 50 MG/1
50 TABLET ORAL NIGHTLY PRN
Status: DISCONTINUED | OUTPATIENT
Start: 2025-04-30 | End: 2025-05-20 | Stop reason: HOSPADM

## 2025-04-30 RX ORDER — ACETAMINOPHEN 325 MG/1
650 TABLET ORAL EVERY 6 HOURS PRN
Status: DISCONTINUED | OUTPATIENT
Start: 2025-04-30 | End: 2025-05-20 | Stop reason: HOSPADM

## 2025-04-30 RX ORDER — POLYETHYLENE GLYCOL 3350 17 G/17G
17 POWDER, FOR SOLUTION ORAL DAILY PRN
Status: DISCONTINUED | OUTPATIENT
Start: 2025-04-30 | End: 2025-05-20 | Stop reason: HOSPADM

## 2025-04-30 RX ORDER — POLYETHYLENE GLYCOL 3350 17 G
2 POWDER IN PACKET (EA) ORAL
Status: DISCONTINUED | OUTPATIENT
Start: 2025-04-30 | End: 2025-05-20 | Stop reason: HOSPADM

## 2025-04-30 RX ORDER — HALOPERIDOL 5 MG/ML
5 INJECTION INTRAMUSCULAR EVERY 6 HOURS PRN
Status: DISCONTINUED | OUTPATIENT
Start: 2025-04-30 | End: 2025-05-20 | Stop reason: HOSPADM

## 2025-04-30 RX ORDER — IBUPROFEN 400 MG/1
400 TABLET, FILM COATED ORAL EVERY 6 HOURS PRN
Status: DISCONTINUED | OUTPATIENT
Start: 2025-04-30 | End: 2025-05-20 | Stop reason: HOSPADM

## 2025-04-30 RX ORDER — LORAZEPAM 2 MG/ML
2 INJECTION INTRAMUSCULAR EVERY 6 HOURS PRN
Status: DISCONTINUED | OUTPATIENT
Start: 2025-04-30 | End: 2025-05-20 | Stop reason: HOSPADM

## 2025-04-30 RX ORDER — QUETIAPINE FUMARATE 50 MG/1
50 TABLET, FILM COATED ORAL NIGHTLY
Status: ON HOLD | DISCHARGE
Start: 2025-04-30

## 2025-04-30 RX ORDER — MAGNESIUM HYDROXIDE/ALUMINUM HYDROXICE/SIMETHICONE 120; 1200; 1200 MG/30ML; MG/30ML; MG/30ML
30 SUSPENSION ORAL EVERY 6 HOURS PRN
Status: DISCONTINUED | OUTPATIENT
Start: 2025-04-30 | End: 2025-05-20 | Stop reason: HOSPADM

## 2025-04-30 RX ORDER — QUETIAPINE FUMARATE 25 MG/1
50 TABLET, FILM COATED ORAL NIGHTLY
Status: DISCONTINUED | OUTPATIENT
Start: 2025-04-30 | End: 2025-04-30 | Stop reason: HOSPADM

## 2025-04-30 RX ORDER — HALOPERIDOL 5 MG/1
5 TABLET ORAL EVERY 6 HOURS PRN
Status: DISCONTINUED | OUTPATIENT
Start: 2025-04-30 | End: 2025-05-20 | Stop reason: HOSPADM

## 2025-04-30 RX ORDER — LORAZEPAM 1 MG/1
2 TABLET ORAL EVERY 6 HOURS PRN
Status: DISCONTINUED | OUTPATIENT
Start: 2025-04-30 | End: 2025-05-20 | Stop reason: HOSPADM

## 2025-04-30 RX ORDER — DIPHENHYDRAMINE HYDROCHLORIDE 50 MG/ML
50 INJECTION, SOLUTION INTRAMUSCULAR; INTRAVENOUS EVERY 6 HOURS PRN
Status: DISCONTINUED | OUTPATIENT
Start: 2025-04-30 | End: 2025-05-20 | Stop reason: HOSPADM

## 2025-04-30 RX ORDER — HYDROXYZINE HYDROCHLORIDE 50 MG/1
50 TABLET, FILM COATED ORAL 3 TIMES DAILY PRN
Status: DISCONTINUED | OUTPATIENT
Start: 2025-04-30 | End: 2025-05-09

## 2025-04-30 ASSESSMENT — SLEEP AND FATIGUE QUESTIONNAIRES
DO YOU HAVE DIFFICULTY SLEEPING: COMMENT
DO YOU USE A SLEEP AID: COMMENT
AVERAGE NUMBER OF SLEEP HOURS: 4

## 2025-04-30 ASSESSMENT — PATIENT HEALTH QUESTIONNAIRE - PHQ9
SUM OF ALL RESPONSES TO PHQ QUESTIONS 1-9: 0
1. LITTLE INTEREST OR PLEASURE IN DOING THINGS: NOT AT ALL
2. FEELING DOWN, DEPRESSED OR HOPELESS: NOT AT ALL

## 2025-04-30 ASSESSMENT — LIFESTYLE VARIABLES
HOW MANY STANDARD DRINKS CONTAINING ALCOHOL DO YOU HAVE ON A TYPICAL DAY: PATIENT DECLINED
HOW OFTEN DO YOU HAVE A DRINK CONTAINING ALCOHOL: PATIENT DECLINED

## 2025-04-30 NOTE — PROGRESS NOTES
Nutrition Note      Positive nutrition screen received for unknown amount of weight loss. Patient admitted for delirium, altered mental status and agitation. Over the past few years patient has lost 5.8% of weight. Patient is now on a Dysphagia Soft and Bite Sized diet. Will add Ensure Plus 3x/day. Patient's POC is looking into hospice care at the [facility] and she prefers [facility]. Monitor medical plan.       Norma SALOMONN, RDN, LDN  Lead Clinical Dietitian  RD Office Phone [phone]      
   Patients bedside RN messaged me this morning to relay message that code status has been changed to DNR-CC. I have discontinued both MRI brain WO and Routine 30 minute EEG at this time to respect patient/family wishes for comfort care only. Neurology will sign off and patient can FU with our office PRN or if requested by family. FU has been placed as needed.     Jeramie Carver MD  Attending Physician   [address] [place], [place] [address]    
  Physician Progress Note      PATIENT:               KERWIN WEN  [facility] #:                  [id number]  :                       1945  ADMIT DATE:       2025 6:20 PM  DISCH DATE:  RESPONDING  PROVIDER #:        Nitin SANCHEZ Blood DO          QUERY TEXT:    A mental status change is documented in the IM notes.  Please specify the   underlying cause:    The clinical indicators include:  H&P: \"79 y.o. Non- / non  female who presents with Altered   Mental Status...neighbors found her wandering outside in her bra and   underwear.  She lives alone with reports of no family.  Patient was confused   upon arrival to the emergency department however visitors at bedside reported   patient has not had medications filled in the last year...  1. Autoimmune thyroiditis  1. Admit to ICU level of care  2. Start levothyroxine 50 mcg daily IV and Solu-Cortef 100 mg every 8 hours   until patient able to tolerate oral  3. Critical care services on consult  4. Continuous cardiac monitoring considering concurrent levothyroxine   administrations  5. Repeat TSH and thyroid panel    2. Altered mental status in the setting of severe dementia and UTI with   urinary retention  1. Altered mental status could be multifactorial considering #1 as well as   dementia  2. Start Rocephin for antimicrobial coverage, send urine culture  3. Bladder scan every 4 hours, should persistent retention encouraged plans to   insert indwelling Olvera  4. Consult neurology for additional evaluation and recommendations  5. Check MRI brain in the morning  6. Start Depakote 250 mg 3 times daily for agitation, continue Aricept 10 mg   nightly    3. Hypokalemia  1. Give 40 mEq of potassium x 1 now and repeat BMP daily.\"    Per MAR: IV Rocephin, Synthroid, Ativan, Zyprexa, IVF    Per flowsheet: disoriented x 4, slurred/incomprehensible speech; bed alarm;   fall precautions  Options provided:  -- Metabolic encephalopathy superimposed on dementia.  -- 
4 Eyes Skin Assessment     NAME:  Leticia Tran  YOB: 1945  MEDICAL RECORD NUMBER:  [id number]    The patient is being assessed for  Admission    I agree that at least one RN has performed a thorough Head to Toe Skin Assessment on the patient. ALL assessment sites listed below have been assessed.      Areas assessed by both nurses:    Head, Face, Ears, Shoulders, Back, Chest, Arms, Elbows, Hands, Sacrum. Buttock, Coccyx, Ischium, and Legs. Feet and Heels        Does the Patient have a Wound? No noted wound(s)       Bar Prevention initiated by RN: Yes  Wound Care Orders initiated by RN: No    Pressure Injury (Stage 3,4, Unstageable, DTI, NWPT, and Complex wounds) if present, place Wound referral order by RN under : No    New Ostomies, if present place, Ostomy referral order under : No     Nurse 1 eSignature: Electronically signed by Evan Schultz RN on 4/25/25 at 6:06 AM EDT    **SHARE this note so that the co-signing nurse can place an eSignature**    Nurse 2 eSignature: {Esignature:[id number]}   
ADMISSION NOTE         Patient admitted to room: 2033     Time of admit: 0545     Admit from: ED      Reason for admission: Thyroid storm/altered mental status       Where patient has been residing for the last 24 hrs: Home       Has the patient been admitted to any facility in the last 4 weeks, which one:  no      Family at bedside: No     Patient is currently: resting in bed comfortably, vitals obtained, telemetry placed on pt. No distress noted. Patient has been oriented to room, educated on how to use call light, and to call for assistance prior to getting up. Bed in lowest and locked position. 2 siderails up for safety. Call light within reach.        
Comprehensive Nutrition Assessment    Type and Reason for Visit:  Reassess    Nutrition Recommendations/Plan:   ADULT DIET; Dysphagia - Soft and Bite Sized  Start Ensure Plus High Protein 3x/day  Monitor p.o intakes, supplement tolerance and labs     Malnutrition Assessment:  Malnutrition Status:  At risk for malnutrition (04/30/25 1348)        Nutrition Assessment:    Patient will not be going into Hospice and instead will be discharged to [facility] to treat dementia and other psychiatric concerns. RN reports patient is not eating and has been refusing meds and refused EEG today. Patient has been noncompliant with medication for the past year according to family. Will start Ensure Plus High Protein 3x/day in hopes that patient will take a sips of the supplement. Due to attempts to get out of bed patient has 1:1 sitter at bedside. Continue Dysphagia Soft and Bite Sized diet as tolerated. Monitor p.o intakes, supplement tolerance and labs.    Nutrition Related Findings:    No edema. Active bowel sounds. Lack of appetite. Altered mental status/ dementia. 1:1 sitter Wound Type: None       Current Nutrition Intake & Therapies:    Average Meal Intake: Refusing to eat, 0%  Average Supplements Intake: None Ordered  ADULT DIET; Dysphagia - Soft and Bite Sized  ADULT ORAL NUTRITION SUPPLEMENT; Breakfast, Dinner, Lunch; Standard High Calorie/High Protein Oral Supplement    Anthropometric Measures:  Height: 165.1 cm (5' 5\")  Ideal Body Weight (IBW): 125 lbs (57 kg)       Current Body Weight: 71.7 kg (158 lb), 126.4 % IBW. Weight Source: Bed scale  Current BMI (kg/m2): 26.3  Usual Body Weight: 78 kg (172 lb)     % Weight Change (Calculated): -5.8                    BMI Categories: Overweight (BMI 25.0-29.9)    Estimated Daily Nutrient Needs:  Energy Requirements Based On: Kcal/kg  Weight Used for Energy Requirements: Current  Energy (kcal/day): 7498-2268 kcal (20-25 kcal/kg)  Weight Used for Protein Requirements: Ideal  Protein 
Dr Stout ordered 12 lead EKG, but pt refused, stating, \"leave me alone.\"  
End Of Shift Note  [facility]  Summary of shift: Pt had an eventful shift. Sitter remains at bedside. Pt agitated and restless throughout shift, with small periods of rest with ativan. Pt kicking off blankets and pulling at pablo and IV line.  See previous notes. No PO meds able to be given throughout shift as pt was not alert enough to safely take. Plan of care ongoing.    Vitals:    Vitals:    04/25/25 1200 04/25/25 2000 04/26/25 0000 04/26/25 0400   BP:  (!) 142/67 134/67 (!) 143/72   Pulse:  63 56 57   Resp:  18 16 16   Temp: 97.3 °F (36.3 °C) 97.6 °F (36.4 °C) 97.7 °F (36.5 °C) 97.3 °F (36.3 °C)   TempSrc: Temporal Temporal Temporal Temporal   SpO2:  100% 100% 95%   Weight:    73.6 kg (162 lb 4.8 oz)   Height:            I&O:   Intake/Output Summary (Last 24 hours) at 4/26/2025 0645  Last data filed at 4/26/2025 0513  Gross per 24 hour   Intake 2163.75 ml   Output 2050 ml   Net 113.75 ml       Resp Status: RA    Ventilator Settings:     / / /     Critical Care IV infusions:   sodium chloride      sodium chloride 100 mL/hr at 04/26/25 0513        LDA:   Peripheral IV 04/25/25 Left;Ventral Forearm (Active)   Number of days: 0       Urinary Catheter 04/25/25 (Active)   Number of days: 0          
Hand off report to Justino MCGUIRE at [facility]. Phone call at [phone]. Charted at 1485 because that's when d/c was.  
Occupational Therapy  [facility]  Occupational Therapy Not Seen Note    Patient not available for Occupational Therapy due to:    [] Testing:    [] Hemodialysis    [] Cancelled by RN:    []Refusal by Patient:    [] Surgery:     [] Intubation:     [] Pain Medication:    [] Sedation:     [] Spine Precautions :    [] Medical Instability:    [x] Other: pt has been combative    Shakira Hanks OTR/JEREMY              
Occupational Therapy  Occupational Therapy Initial Evaluation  Facility/Department: [facility]   Patient Name: Leticia Tran        MRN: [id number]    : 1945    Date of Service: 2025    RN reports patient is medically stable for therapy treatment this date.    Chart reviewed prior to treatment and patient is agreeable for therapy.  All lines intact and patient positioned comfortably at end of treatment.  All patient needs addressed prior to ending therapy session.      Discharge Recommendations  Discharge Recommendations: Continue to assess pending progress, Patient would benefit from continued therapy after discharge       Chief Complaint   Patient presents with    Altered Mental Status     Past Medical History:  has a past medical history of Autoimmune thyroiditis, Dementia (HCC), and Osteoporosis.  Past Surgical History:  has no past surgical history on file.    Assessment  Performance deficits / Impairments: Decreased ADL status;Decreased functional mobility ;Decreased safe awareness;Decreased cognition;Decreased balance;Decreased posture;Decreased strength;Decreased fine motor control  Assessment: Pt tolerated OT eval poorly. Pt able to minimally participate d/t cognitive deficits, agitation and lethargy. Pt will require a reassessment of ADL transfers and functional mobility when appropriate to add goal to OT. Pt would benefit from continued skilled OT services to increase I and safety during functional tasks to return home at prior level of function as able.  Prognosis: Fair  Decision Making: Medium Complexity  Activity Tolerance  Activity Tolerance: Treatment limited secondary to decreased cognition;Treatment limited secondary to agitation  Activity Tolerance Comments: Pt minimally able to particpate in OT eval d/t lethargy and cognitive deficits  Safety Devices  Type of Devices: Bed alarm in place;Call light within reach;Gait belt;Nurse notified;Left in bed;Sitter 
Occupational Therapy  Occupational Therapy Re-Evaluation  Facility/Department: [facility]   Patient Name: Leticia Tran        MRN: [id number]    : 1945    Date of Service: 2025    RN reports patient is medically stable for therapy treatment this date.    Chart reviewed prior to treatment and patient is agreeable for therapy.  All lines intact and patient positioned comfortably at end of treatment.  All patient needs addressed prior to ending therapy session.      Discharge Recommendations  Discharge Recommendations: Patient would benefit from continued therapy after discharge   Pt currently functioning below baseline.  Recommend daily therapy at time of discharge to maximize long term outcomes and prevent re-admission. Please refer to AM-PAC score for current level of function.      Chief Complaint   Patient presents with    Altered Mental Status     Past Medical History:  has a past medical history of Autoimmune thyroiditis, Dementia (HCC), and Osteoporosis.  Past Surgical History:  has no past surgical history on file.    Assessment  Performance deficits / Impairments: Decreased ADL status;Decreased functional mobility ;Decreased safe awareness;Decreased cognition;Decreased balance;Decreased posture;Decreased strength;Decreased fine motor control;Decreased endurance  Assessment: Pt tolerated OT re-eval fair. Pt is limited by cognitive deficits, poor safety awareness, general weakness and decreased activity tolerance. Skilled OT services are indicated to increase I and safety during functional taks to return home at [facility] as able.  Prognosis: Fair  Activity Tolerance  Activity Tolerance: Treatment limited secondary to decreased cognition  Activity Tolerance Comments: Pt cooperative throughout session  Safety Devices  Type of Devices: Bed alarm in place;Call light within reach;Left in bed;Nurse notified;Sitter present  Restraints  Restraints Initially in Place: No   
Patient became increasingliy agitated again. Four staff members were in room trying to get patient to stay into bed and not pull out her Olvera and IV. Patient was attempting to kick, bite, and hit staff. On-call NP was notified. One time dose of IM Zyprexa was ordered and given at this time.   
Patient extremely agitated, trying to climb out of bed, kicking legs at staff, and pulled out her IV. Patient is not able to be redirected.    IM Zyprexa was administered at this time for agitation.   
Physical Therapy  DATE: 2025    NAME: Leticia Tran  MRN: [id number]   : 1945    Patient not seen this date for Physical Therapy due to:      [x] Cancel by RN or physician due to:Pt current medical status. PT to continue to follow and address as indicated.     [] Hemodialysis    [] Critical Lab Value Level     [] Blood transfusion in progress    [] Acute or unstable cardiovascular status   _MAP < 55 or more than >115  _HR < 40 or > 130    [] Acute or unstable pulmonary status   -FiO2 > 60%   _RR < 5 or >40    _O2 sats < 85%    [] Strict Bedrest    [] Off Unit for surgery or procedure    [] Off Unit for testing       [] Pending imaging to R/O fracture    [] Refusal by Patient      [] Other      [] PT being discontinued at this time. Patient independent. No further needs.     [] PT being discontinued at this time as the patient has been transferred to hospice care. No further needs.      Vicky Saunders, PT     
Physical Therapy  Facility/Department: [facility]   Physical Therapy Daily Treatment Note    Patient Name: Leticia Tran        MRN: [id number]    : 1945    Date of Service: 2025  MAYNOR Loera reports patient is medically stable for therapy treatment this date.    Chart reviewed prior to treatment and patient is agreeable for therapy.  All lines intact and patient positioned comfortably at end of treatment.  All patient needs addressed prior to ending therapy session.      Chief Complaint   Patient presents with    Altered Mental Status     Past Medical History:  has a past medical history of Autoimmune thyroiditis, Dementia (HCC), and Osteoporosis.  Past Surgical History:  has no past surgical history on file.    Discharge Recommendations  Discharge Recommendations: Patient would benefit from continued therapy after discharge  Pt currently functioning below baseline.  Recommend daily therapy at time of discharge to maximize long term outcomes and prevent re-admission. Please refer to AM-PAC score for current level of function.    Assessment  Body Structures, Functions, Activity Limitations Requiring Skilled Therapeutic Intervention: Decreased functional mobility ;Decreased safe awareness;Decreased endurance;Decreased balance;Decreased high-level IADLs;Decreased coordination;Decreased posture;Decreased cognition  Assessment: Pt tolerated session poor.  Pt lethargic and easily agitated throughout session this date, only able to tolerate sitting EOB.  Pt would benefit from continued skilled PT to address deficits in order to maximize independence w/ functional mobility and return to PLOF as able.  Requires PT Follow-Up: Yes  Activity Tolerance  Activity Tolerance: Treatment limited secondary to decreased cognition;Treatment limited secondary to agitation  Safety Devices  Type of Devices: Call light within reach;Left in bed;Nurse notified (Bed alarm not working at end of session d/t pt's position.  Both RN & 
Physical Therapy  Facility/Department: [facility]   Physical Therapy Re-Evaluation    Patient Name: Leticia Tran        MRN: [id number]    : 1945    Date of Service: 2025  MAYNOR Pendleton reports patient is medically stable for therapy treatment this date.    Chart reviewed prior to treatment and patient is agreeable for therapy.  All lines intact and patient positioned comfortably at end of treatment.  All patient needs addressed prior to ending therapy session.      Chief Complaint   Patient presents with    Altered Mental Status     Past Medical History:  has a past medical history of Autoimmune thyroiditis, Dementia (HCC), and Osteoporosis.  Past Surgical History:  has no past surgical history on file.    Discharge Recommendations  Discharge Recommendations: Patient would benefit from continued therapy after discharge  Pt currently functioning below baseline.  Recommend daily therapy at time of discharge to maximize long term outcomes and prevent re-admission. Please refer to AM-PAC score for current level of function.    Assessment  Body Structures, Functions, Activity Limitations Requiring Skilled Therapeutic Intervention: Decreased functional mobility , Decreased safe awareness, Decreased endurance, Decreased balance, Decreased high-level IADLs, Decreased coordination, Decreased posture, Decreased cognition  Assessment: Pt tolerated session fair.  Pt able to attempt ambulation this date however demonstrating poor steadiness throughout requiring skilled 2 person assist to maintain safety.  Activity most limited by decreased cognition.  Pt is currently a HIGH fall risk and would benefit from continued skilled PT to address deficits in order to maximize independence w/ functional mobility and return to PLOF as able.  Therapy Prognosis: Good, Fair  Decision Making: Medium Complexity  Requires PT Follow-Up: Yes  Activity Tolerance  Activity Tolerance: Treatment limited secondary to decreased 
Physical Therapy  Facility/Department: [facility]   Physical Therapy Initial Evaluation    Patient Name: Leticia Tran        MRN: [id number]    : 1945    Date of Service: 2025  MAYNOR Salinas reports patient is medically stable for therapy treatment this date.    Chart reviewed prior to treatment and patient is agreeable for therapy.  All lines intact and patient positioned comfortably at end of treatment.  All patient needs addressed prior to ending therapy session.      Chief Complaint   Patient presents with    Altered Mental Status     Past Medical History:  has a past medical history of Autoimmune thyroiditis, Dementia (HCC), and Osteoporosis.  Past Surgical History:  has no past surgical history on file.    Discharge Recommendations  Discharge Recommendations: Patient would benefit from continued therapy after discharge  Pt currently functioning below baseline.  Recommend daily therapy at time of discharge to maximize long term outcomes and prevent re-admission. Please refer to AM-PAC score for current level of function.    Assessment  Body Structures, Functions, Activity Limitations Requiring Skilled Therapeutic Intervention: Decreased functional mobility , Decreased safe awareness, Decreased endurance, Decreased balance, Decreased high-level IADLs, Decreased coordination, Decreased posture, Decreased cognition  Assessment: Pt tolerated PT eval poor.  Activity significantly limited by increased agitation / decreased cognition this session.  Pt currently presenting w/ deficits in seated balance, endurance, posture, and mobility.  At this time pt requires skilled 2 person assist for safe mobility and is at an increased risk for falls.  Pt would benefit from continued skilled PT to address deficits in order to maximize independence w/ functional mobility, prevent sedentary complications, and return to PLOF as able.  Therapy Prognosis: Good, Fair  Decision Making: Medium Complexity  Requires PT Follow-Up: 
[facility]  Office: [phone]  Carlos Shrestha DO, Krzysztof Castro DO, Jarret Gonzalez DO, Nitin Stout DO, Joaquim Lindquist MD, Joyce Pacheco MD, Klever Zamora MD, Crissy Love MD,  Travon Shetty MD, Payam Car MD, Anup Mart MD,  Piotr Hernandez DO, Lavonne Mario MD, Yordan Singleton MD, Nehemiah Shrestha DO, Vi Iraheta MD,  Paul Beckwith DO, Sandi Castro MD, Yvonne Cobos MD, Kayli Friedman MD,  Karthikeyan Carney MD, Karlos Snider MD, Demario Sawyer MD, Migdalia Guzman MD, Vito Benitez MD, Aziza Phelps MD, Connor Garza DO, Manju Mccann MD, Zion Bhakta MD, Piotr Louis MD, Mohsin Reza, MD, Marina Tony MD, Shirley Waterhouse, CNP,  Loretta Shabazz, CNP, Connor Moore, CNP,  Lorene Segal, DNP, Lisa Pandey, CNP, Leslie Hernandez, CNP, Margy Tan, CNP, Natalia Maurice, CNP, Adilene Cruz, PA-C, Sarah Vivas, CNP, Chelsi Horta, CNP,  Ashley Ramirez, CNP, Leticia Loza, CNP, Sha Tatum, PA-C, Micaela Cid, CNP,  Fallon Garland, CNS, Norma Zuniga, CNP, Lisa Infante, CNP,   Tori Jesus, CNP         [facility]   IN-PATIENT SERVICE   [facility]    Progress Note    4/27/2025    3:03 PM    Name:   Leticia Tran  MRN:     [id number]     Acct:      [id number]   Room:   2010/2010-02  IP Day:  2  Admit Date:  4/24/2025  6:20 PM    PCP:   Neema Hsieh MD  Code Status:  DNR-CC    Subjective:     C/C:   Chief Complaint   Patient presents with    Altered Mental Status     Interval History Status: improved.     Received zyprexa and haldol overnight and still was hitting/kicking nurse      Brief History:     This 79 yof was found by her landlord (who is also her neighbor) sitting in yard undressing-she had been there for 3 hours. Upon arrival here she is minimally responsive with significantly altered mentation. I spoke to her pcp who saw her on 4/5 at which time she was at baseline: walking, talking, oriented to self only, but 
Spiritual Health History and Assessment/Progress Note  [facility]    (P) Spiritual/Emotional Needs,  ,  ,      Name: Leticia Tran MRN: [id number]    Age: 79 y.o.     Sex: female   Language: English   Lutheran: None   Myxedema coma (HCC)     Date: 4/29/2025            Total Time Calculated: (P) 8 min              Spiritual Assessment began in [facility]        Referral/Consult From: (P) Palliative Care   Encounter Overview/Reason: (P) Spiritual/Emotional Needs  Service Provided For: (P) Patient    Patient engaged with  despite significant confusion welcoming  care and blessing coming alongside patient's Sabianism renea.    Renea, Belief, Meaning:   Patient is connected with a renea tradition or spiritual practice and has beliefs or practices that help with coping during difficult times  Family/Friends No family/friends present      Importance and Influence:  Patient has spiritual/personal beliefs that influence decisions regarding their health  Family/Friends No family/friends present    Community:  Patient Other: unable to confirm with patient, however a daughter and two granddaughters are listed as contacts  Family/Friends No family/friends present    Assessment and Plan of Care:     Patient Interventions include: Facilitated expression of thoughts and feelings, Explored spiritual coping/struggle/distress, and Affirmed coping skills/support systems  Family/Friends Interventions include: No family/friends present    Patient Plan of Care: Spiritual Care available upon further referral  Family/Friends Plan of Care: Spiritual Care available upon further referral    Electronically signed by Chaplain Garret on 4/29/2025 at 12:02 PM   
[facility]  Office: [phone]  Carlos Shrestha DO, Krzysztof Castro DO, Jarret Gonzalez DO, Nitin Stout DO, Joaquim Lindquist MD, Joyce Pacheco MD, Klever Zamora MD, Crissy Love MD,  Travon Shetty MD, Payam Car MD, Anup Mart MD,  Piotr Hernandez DO, Lavonne Mario MD, Yordan Singleton MD, Nehemiah Shrestha DO, Vi Iraheta MD,  Paul Beckwith DO, Sandi Castro MD, Yvonne Cobos MD, Kayli Friedman MD,  Karthikeyan Carney MD, Karlos Snider MD, Demario Sawyer MD, Migdalia Guzman MD, Vito Benitez MD, Aziza Phelps MD, Connor Garza DO, Manju Mccann MD, Zion Bhakta MD, Piotr Louis MD, Mohsin Reza, MD, Marina Tony MD, Shirley Waterhouse, CNP,  Loretta Shabazz, CNP, Connor Moore, CNP,  Lorene Segal, DNP, Lisa Pandey, CNP, Leslie Hernandez, CNP, Margy Tan, CNP, Natalia Maurice, CNP, Adilene Cruz, PA-C, Sarah Vivas, CNP, Chelsi Horta, CNP,  Ashley Ramirez, CNP, Leticia Loza, CNP, Sha Tatum, PA-C, Micaela Cid, CNP,  Fallon Garland, CNS, Norma Zuniga, CNP, Lisa Infante, CNP,   Tori Jesus, CNP         [facility]   IN-PATIENT SERVICE   [facility]    Progress Note    4/28/2025    9:16 AM    Name:   Leticia Tran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

## 2025-04-30 NOTE — DISCHARGE SUMMARY
[facility]  Office: [phone]  Carlos Shrestha DO, Krzysztof Castro DO, Jarret Gonzalez DO, Nitin Stout DO, Joaquim Lindquist MD, Joyce Pacheco MD, Klever Zamora MD, Crissy Love MD,  Travon Shetty MD, Payam Car MD, Anup Mart MD,  Piotr Hernandez DO, Lavonne Mario MD, Yordan Singleton MD, Nehemiah Shrestha DO, Vi Iraheta MD,  Paul Beckwith DO, Sandi Castro MD, Yvonne Cobos MD, Kayli Friedman MD,  Karthikeyan Carney MD, Karlos Snider MD, Demario Sawyer MD, Migdalia Guzman MD, Vito Benitez MD, Aziza Phelps MD, Connor Garza DO, Manju Mccann MD, Zion Bhakta MD, Piotr Louis MD, Mohsin Reza, MD, Marina Tony MD, Shirley Waterhouse, CNP,  Loretta Shabazz, CNP, Connor Moore, CNP,  Lorene Segal, DNP, Lisa Pandey, CNP, Leslie Hernandez, CNP, Margy Tan, CNP, Natalia Maurice, CNP, Adilene Cruz, PA-C, Sarah Vivas, CNP, Chelsi Horta, CNP,  Ashley Ramirez, CNP, Leticia Loza, CNP, Sha Tatum, PA-C, Micaela Cid, CNP,  Fallon Garland, CNS, Norma Zuniga, CNP, Lisa Infante, CNP,   Tori Jesus, CNP         [facility]   IN-PATIENT SERVICE   [facility]    Discharge Summary     Patient ID: Leticia Tran  :  1945   MRN: [id number]     ACCOUNT:  [id number]   Patient's PCP: Neema Hsieh MD  Admit Date: 2025   Discharge Date: 2025     Length of Stay: 5  Code Status:  DNR-CC  Admitting Physician: Nitin P Blood, DO  Discharge Physician: Nitin P Blood, DO     Active Discharge Diagnoses:     Hospital Problem Lists:  Principal Problem:    Myxedema coma (HCC)  Active Problems:    Severe dementia with agitation (HCC)    Autoimmune thyroiditis    Acute urinary retention    Hypokalemia    Agitation due to dementia (HCC)    Delirium  Resolved Problems:    * No resolved hospital problems. *      Admission Condition:  critical     Discharged Condition: stable    Hospital Stay:     Hospital Course:  Leticia Tran is a 79

## 2025-04-30 NOTE — PLAN OF CARE
Problem: Discharge Planning  Goal: Discharge to home or other facility with appropriate resources  4/25/2025 1658 by Consuelo Musa, RN  Outcome: Progressing     Problem: Pain  Goal: Verbalizes/displays adequate comfort level or baseline comfort level  4/25/2025 1658 by Consuelo Musa, RN  Outcome: Progressing     Problem: Neurosensory - Adult  Goal: Achieves stable or improved neurological status  4/25/2025 1658 by Consuelo Musa, RN  Outcome: Progressing     Problem: Neurosensory - Adult  Goal: Absence of seizures  4/25/2025 1658 by Consuelo Musa, RN  Outcome: Progressing     Problem: Neurosensory - Adult  Goal: Remains free of injury related to seizures activity  4/25/2025 1658 by Consuelo Musa, RN  Outcome: Progressing     Problem: Neurosensory - Adult  Goal: Achieves maximal functionality and self care  4/25/2025 1658 by Consuelo Musa, RN  Outcome: Progressing     Problem: Respiratory - Adult  Goal: Achieves optimal ventilation and oxygenation  4/25/2025 1658 by Consuelo Musa, RN  Outcome: Progressing     Problem: Cardiovascular - Adult  Goal: Maintains optimal cardiac output and hemodynamic stability  4/25/2025 1658 by Consuelo Musa, RN  Outcome: Progressing     Problem: Cardiovascular - Adult  Goal: Absence of cardiac dysrhythmias or at baseline  4/25/2025 1658 by Consuelo Musa, RN  Outcome: Progressing     Problem: Skin/Tissue Integrity - Adult  Goal: Skin integrity remains intact  4/25/2025 1658 by Consuelo Musa, RN  Outcome: Progressing     Problem: Musculoskeletal - Adult  Goal: Return mobility to safest level of function  4/25/2025 1658 by Consuelo Musa, RN  Outcome: Progressing     Problem: Gastrointestinal - Adult  Goal: Minimal or absence of nausea and vomiting  4/25/2025 1658 by Consuelo Musa, RN  Outcome: Progressing     Problem: Gastrointestinal - Adult  Goal: Maintains or returns to baseline bowel function  4/25/2025 1658 by Consuelo Musa, RN  Outcome: 
  Problem: Discharge Planning  Goal: Discharge to home or other facility with appropriate resources  4/26/2025 0946 by Jenniffer Williamson RN  Outcome: Progressing  Flowsheets (Taken 4/26/2025 0800)  Discharge to home or other facility with appropriate resources: Identify barriers to discharge with patient and caregiver  4/25/2025 2332 by Lelia Coyle RN  Outcome: Progressing     Problem: Pain  Goal: Verbalizes/displays adequate comfort level or baseline comfort level  4/26/2025 0946 by Jenniffer Williamson RN  Outcome: Progressing  4/25/2025 2332 by Lelia Coyle RN  Outcome: Progressing  Flowsheets (Taken 4/25/2025 2000)  Verbalizes/displays adequate comfort level or baseline comfort level:   Encourage patient to monitor pain and request assistance   Administer analgesics based on type and severity of pain and evaluate response   Assess pain using appropriate pain scale   Implement non-pharmacological measures as appropriate and evaluate response   Consider cultural and social influences on pain and pain management     Problem: Neurosensory - Adult  Goal: Achieves stable or improved neurological status  4/26/2025 0946 by Jenniffer Williamson RN  Outcome: Progressing  Flowsheets (Taken 4/26/2025 0800)  Achieves stable or improved neurological status: Assess for and report changes in neurological status  4/25/2025 2332 by Lelia Coyle RN  Outcome: Progressing  Goal: Absence of seizures  4/26/2025 0946 by Jenniffer Williamson RN  Outcome: Progressing  Flowsheets (Taken 4/26/2025 0800)  Absence of seizures: Monitor for seizure activity.  If seizure occurs, document type and location of movements and any associated apnea  4/25/2025 2332 by Lelia Coyle RN  Outcome: Progressing  Goal: Remains free of injury related to seizures activity  4/26/2025 0946 by Jenniffer Williamson RN  Outcome: Progressing  Flowsheets (Taken 4/26/2025 0800)  Remains free of injury related to seizure activity: Maintain airway, patient safety  and administer oxygen as 
  Problem: Discharge Planning  Goal: Discharge to home or other facility with appropriate resources  Outcome: Progressing     Problem: Pain  Goal: Verbalizes/displays adequate comfort level or baseline comfort level  Outcome: Progressing     Problem: Neurosensory - Adult  Goal: Achieves stable or improved neurological status  Outcome: Progressing  Goal: Absence of seizures  Outcome: Progressing  Goal: Remains free of injury related to seizures activity  Outcome: Progressing  Goal: Achieves maximal functionality and self care  Outcome: Progressing     Problem: Respiratory - Adult  Goal: Achieves optimal ventilation and oxygenation  Outcome: Progressing     Problem: Cardiovascular - Adult  Goal: Maintains optimal cardiac output and hemodynamic stability  Outcome: Progressing  Goal: Absence of cardiac dysrhythmias or at baseline  Outcome: Progressing     Problem: Skin/Tissue Integrity - Adult  Goal: Skin integrity remains intact  Outcome: Progressing     Problem: Musculoskeletal - Adult  Goal: Return mobility to safest level of function  Outcome: Progressing     Problem: Gastrointestinal - Adult  Goal: Minimal or absence of nausea and vomiting  Outcome: Progressing  Goal: Maintains or returns to baseline bowel function  Outcome: Progressing  Goal: Maintains adequate nutritional intake  Outcome: Progressing     Problem: Genitourinary - Adult  Goal: Absence of urinary retention  Outcome: Progressing     Problem: Infection - Adult  Goal: Absence of infection at discharge  Outcome: Progressing     Problem: Metabolic/Fluid and Electrolytes - Adult  Goal: Electrolytes maintained within normal limits  Outcome: Progressing     Problem: Hematologic - Adult  Goal: Maintains hematologic stability  Outcome: Progressing     
  Problem: Discharge Planning  Goal: Discharge to home or other facility with appropriate resources  Outcome: Progressing  Flowsheets (Taken 4/27/2025 0445)  Discharge to home or other facility with appropriate resources:   Identify barriers to discharge with patient and caregiver   Arrange for needed discharge resources and transportation as appropriate   Identify discharge learning needs (meds, wound care, etc)     Problem: Pain  Goal: Verbalizes/displays adequate comfort level or baseline comfort level  Outcome: Progressing  Flowsheets (Taken 4/28/2025 0535)  Verbalizes/displays adequate comfort level or baseline comfort level:   Encourage patient to monitor pain and request assistance   Administer analgesics based on type and severity of pain and evaluate response   Implement non-pharmacological measures as appropriate and evaluate response   Assess pain using appropriate pain scale     Problem: Neurosensory - Adult  Goal: Remains free of injury related to seizures activity  Outcome: Progressing     Problem: Cardiovascular - Adult  Goal: Maintains optimal cardiac output and hemodynamic stability  Outcome: Progressing     Problem: Musculoskeletal - Adult  Goal: Return mobility to safest level of function  Outcome: Progressing  Flowsheets (Taken 4/26/2025 0800 by Jenniffer Williamson, RN)  Return Mobility to Safest Level of Function: Assess patient stability and activity tolerance for standing, transferring and ambulating with or without assistive devices     Problem: Metabolic/Fluid and Electrolytes - Adult  Goal: Electrolytes maintained within normal limits  Outcome: Progressing  Flowsheets (Taken 4/27/2025 0445)  Electrolytes maintained within normal limits:   Administer electrolyte replacement as ordered   Monitor response to electrolyte replacements, including repeat lab results as appropriate   Monitor labs and assess patient for signs and symptoms of electrolyte imbalances     
  Problem: Safety - Adult  Goal: Free from fall injury  4/29/2025 1232 by Helder Minor, RN  Outcome: Progressing  Note: Patient remains free from falls. Bed in lowest position, call light within reach side rail x hourly rounding. Patient on 1:1 for confusion and pulling at pablo  4/29/2025 0430 by Cassandra Clark, RN  Outcome: Progressing     
Had received message that pink slip not filed out correctly.  Talked to charge nurse at [facility] and actually pink slip is filled out correctly.  Informed her we were unable to get EKG as had been previously ordered as patient was agitated and would not allow it to be done    Nitin Stout, DO    
Patient received one dose of Zyprexa overnight. Shift began with patient being rather cooperative, able to answer some questions, and willing to take  her oral medication and eat applesauce. Patient later in the evening became agitated and more confused, attempting to climb out of bed and hit staff. On-call NP was notified and one dose of IM Zyprexa was given. Patient still attempting to get out of bed and pull at lines at times.     Problem: Discharge Planning  Goal: Discharge to home or other facility with appropriate resources  4/29/2025 0430 by Cassandra Clark RN  Outcome: Progressing  Flowsheets (Taken 4/29/2025 0430)  Discharge to home or other facility with appropriate resources:   Identify barriers to discharge with patient and caregiver   Arrange for needed discharge resources and transportation as appropriate   Identify discharge learning needs (meds, wound care, etc)   Arrange for interpreters to assist at discharge as needed     Problem: Pain  Goal: Verbalizes/displays adequate comfort level or baseline comfort level  4/29/2025 0430 by Cassandra Clark RN  Outcome: Progressing  Flowsheets (Taken 4/29/2025 0430)  Verbalizes/displays adequate comfort level or baseline comfort level:   Encourage patient to monitor pain and request assistance   Assess pain using appropriate pain scale     Problem: Musculoskeletal - Adult  Goal: Return mobility to safest level of function  4/29/2025 0430 by Cassandra Clark RN  Outcome: Progressing  Flowsheets (Taken 4/29/2025 0430)  Return Mobility to Safest Level of Function: Assess patient stability and activity tolerance for standing, transferring and ambulating with or without assistive devices     Problem: Skin/Tissue Integrity  Goal: Skin integrity remains intact  4/29/2025 0430 by aCssandra Clark RN  Outcome: Progressing  Flowsheets (Taken 4/29/2025 0430)  Skin Integrity Remains Intact: Monitor for areas of redness and/or skin breakdown     
Metabolic/Fluid and Electrolytes - Adult  Goal: Electrolytes maintained within normal limits  Outcome: Progressing  Flowsheets (Taken 4/27/2025 0445)  Electrolytes maintained within normal limits:   Administer electrolyte replacement as ordered   Monitor response to electrolyte replacements, including repeat lab results as appropriate   Monitor labs and assess patient for signs and symptoms of electrolyte imbalances     Problem: Skin/Tissue Integrity  Goal: Skin integrity remains intact  Outcome: Progressing  Flowsheets (Taken 4/27/2025 0445)  Skin Integrity Remains Intact:   Turn and reposition as indicated   Monitor for areas of redness and/or skin breakdown     
Progressing     Problem: Respiratory - Adult  Goal: Achieves optimal ventilation and oxygenation  4/25/2025 2332 by Lelia Coyle RN  Outcome: Progressing  4/25/2025 1824 by Consuelo Musa RN  Outcome: Progressing  4/25/2025 1658 by Consuelo Musa, RN  Outcome: Progressing     Problem: Cardiovascular - Adult  Goal: Maintains optimal cardiac output and hemodynamic stability  4/25/2025 2332 by Lelia Coyle RN  Outcome: Progressing  4/25/2025 1824 by Consuelo Musa, RN  Outcome: Progressing  4/25/2025 1658 by Consuelo Musa, RN  Outcome: Progressing  Goal: Absence of cardiac dysrhythmias or at baseline  4/25/2025 2332 by Lelia Coyle RN  Outcome: Progressing  4/25/2025 1824 by Consuelo Musa, RN  Outcome: Progressing  4/25/2025 1658 by Consuelo Musa RN  Outcome: Progressing     Problem: Skin/Tissue Integrity - Adult  Goal: Skin integrity remains intact  Description: 1.  Monitor for areas of redness and/or skin breakdown2.  Assess vascular access sites hourly3.  Every 4-6 hours minimum:  Change oxygen saturation probe site4.  Every 4-6 hours:  If on nasal continuous positive airway pressure, respiratory therapy assess nares and determine need for appliance change or resting period  4/25/2025 2332 by Lelia Coyle RN  Outcome: Progressing  Flowsheets (Taken 4/25/2025 2035)  Skin Integrity Remains Intact: Monitor for areas of redness and/or skin breakdown  4/25/2025 1824 by Consuelo Musa, RN  Outcome: Progressing  4/25/2025 1658 by Consuelo Musa, RN  Outcome: Progressing     Problem: Musculoskeletal - Adult  Goal: Return mobility to safest level of function  4/25/2025 2332 by Lelia Coyle RN  Outcome: Progressing  4/25/2025 1824 by Consuelo Musa, RN  Outcome: Progressing  4/25/2025 1658 by Consuelo Musa, RN  Outcome: Progressing     Problem: Gastrointestinal - Adult  Goal: Minimal or absence of nausea and vomiting  4/25/2025 2332 by Lelia Coyle RN  Outcome: 
dysrhythmias or at baseline  4/28/2025 1812 by Jailene Pascual RN  Outcome: Progressing  Outcome: Progressing     Problem: Skin/Tissue Integrity - Adult  Goal: Skin integrity remains intact  Description: 1.  Monitor for areas of redness and/or skin breakdown2.  Assess vascular access sites hourly3.  Every 4-6 hours minimum:  Change oxygen saturation probe site4.  Every 4-6 hours:  If on nasal continuous positive airway pressure, respiratory therapy assess nares and determine need for appliance change or resting period  4/28/2025 1812 by Jailene Pascual RN  Outcome: Progressing  Outcome: Progressing     Problem: Musculoskeletal - Adult  Goal: Return mobility to safest level of function  4/28/2025 1812 by Jailene Pascual RN  Outcome: Progressing  Outcome: Progressing  Return Mobility to Safest Level of Function: Assess patient stability and activity tolerance for standing, transferring and ambulating with or without assistive devices     Problem: Gastrointestinal - Adult  Goal: Minimal or absence of nausea and vomiting  4/28/2025 1812 by Jailene Pascual RN  Outcome: Progressing  Outcome: Progressing  Goal: Maintains or returns to baseline bowel function  4/28/2025 1812 by Jailene Pascual RN  Outcome: Progressing  Outcome: Progressing  Goal: Maintains adequate nutritional intake  4/28/2025 1812 by Jailene Pascual RN  Outcome: Progressing  Outcome: Progressing     Problem: Genitourinary - Adult  Goal: Absence of urinary retention  4/28/2025 1812 by Jailene Pascual RN  Outcome: Progressing  Outcome: Progressing     Problem: Infection - Adult  Goal: Absence of infection at discharge  4/28/2025 1812 by Jailene Pascual RN  Outcome: Progressing  Outcome: Progressing     Problem: Metabolic/Fluid and Electrolytes - Adult  Goal: Electrolytes maintained within normal limits  4/28/2025 1812 by Jailene Pascual RN  Outcome: Progressing  Outcome: Progressing  Flowsheets (Taken 4/27/2025 0445)  Electrolytes maintained within normal limits:   
indicated

## 2025-04-30 NOTE — BH NOTE
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 Patient was labile between combative and cooperative while at [facility]. Upon admission patient was lethargic and unable to answer any assessment questions. Patient has pablo catheter.      Addictive Behavior:   Addictive Behavior  In the Past 3 Months, Have You Felt or Has Someone Told You That You Have a Problem With  : None    Medical Problems:   Past Medical History:   Diagnosis Date    Autoimmune thyroiditis     Dementia (HCC)     Osteoporosis        Status EXAM:  Mental Status and Behavioral Exam  Normal: No  Level of Assistance: Needs encouragement (JENARO, fatigued upon arrival, unable to answer questions/display capability)  Facial Expression: Flat  Affect: Blunt  Level of Consciousness: Lethargic  Frequency of Checks: 1 staff: 1 patient  - continuous  Mood:Normal: No  Mood: Labile  Motor Activity:Normal: No  Motor Activity: Decreased  Eye Contact: Poor  Observed Behavior: Withdrawn  Sexual Misconduct History: Current - no  Preception: [place] to person  Attention:Normal: No  Attention: Unable to concentrate  Thought Processes: Blocking, Loose association  Thought Content:Normal: No  Thought Content: Delusions  Depression Symptoms: Loss of interest, Isolative, Change in energy level  Anxiety Symptoms: No problems reported or observed.  Keila Symptoms: No problems reported or observed.  Hallucinations: None  Delusions: Yes  Delusions: Other (comment)  Memory:Normal: No  Memory: Confabulation, Poor recent, Poor remote  Insight and Judgment: No  Insight and Judgment: Poor judgment, Poor insight, Unrealistic    Tobacco Screening:  Practical Counseling, on admission, jose guadalupe X, if applicable and completed

## 2025-04-30 NOTE — BH NOTE
Patient arrived on unit at this time via stretcher with all personal belongings escorted by two transport staff members.

## 2025-05-01 LAB
ANION GAP SERPL CALCULATED.3IONS-SCNC: 13 MMOL/L (ref 9–16)
BUN SERPL-MCNC: 13 MG/DL (ref 8–23)
CALCIUM SERPL-MCNC: 9.2 MG/DL (ref 8.6–10.4)
CHLORIDE SERPL-SCNC: 107 MMOL/L (ref 98–107)
CO2 SERPL-SCNC: 26 MMOL/L (ref 20–31)
CREAT SERPL-MCNC: 1 MG/DL (ref 0.7–1.2)
GFR, ESTIMATED: 58 ML/MIN/1.73M2
GLUCOSE SERPL-MCNC: 97 MG/DL (ref 74–99)
POTASSIUM SERPL-SCNC: 2.9 MMOL/L (ref 3.7–5.3)
SODIUM SERPL-SCNC: 146 MMOL/L (ref 136–145)
T4 FREE SERPL-MCNC: 0.3 NG/DL (ref 0.9–1.7)
TSH SERPL DL<=0.05 MIU/L-ACNC: 222 UIU/ML (ref 0.27–4.2)

## 2025-05-01 PROCEDURE — 84443 ASSAY THYROID STIM HORMONE: CPT

## 2025-05-01 PROCEDURE — 36415 COLL VENOUS BLD VENIPUNCTURE: CPT

## 2025-05-01 PROCEDURE — 6370000000 HC RX 637 (ALT 250 FOR IP): Performed by: INTERNAL MEDICINE

## 2025-05-01 PROCEDURE — 80048 BASIC METABOLIC PNL TOTAL CA: CPT

## 2025-05-01 PROCEDURE — 1240000000 HC EMOTIONAL WELLNESS R&B

## 2025-05-01 PROCEDURE — 99213 OFFICE O/P EST LOW 20 MIN: CPT

## 2025-05-01 PROCEDURE — APPSS60 APP SPLIT SHARED TIME 46-60 MINUTES

## 2025-05-01 PROCEDURE — 6370000000 HC RX 637 (ALT 250 FOR IP): Performed by: PSYCHIATRY & NEUROLOGY

## 2025-05-01 PROCEDURE — 84439 ASSAY OF FREE THYROXINE: CPT

## 2025-05-01 PROCEDURE — 99222 1ST HOSP IP/OBS MODERATE 55: CPT | Performed by: PSYCHIATRY & NEUROLOGY

## 2025-05-01 PROCEDURE — 93005 ELECTROCARDIOGRAM TRACING: CPT | Performed by: INTERNAL MEDICINE

## 2025-05-01 PROCEDURE — 99223 1ST HOSP IP/OBS HIGH 75: CPT | Performed by: INTERNAL MEDICINE

## 2025-05-01 RX ORDER — VITAMIN B COMPLEX
1000 TABLET ORAL DAILY
Status: DISCONTINUED | OUTPATIENT
Start: 2025-05-01 | End: 2025-05-20 | Stop reason: HOSPADM

## 2025-05-01 RX ORDER — LANOLIN ALCOHOL/MO/W.PET/CERES
1000 CREAM (GRAM) TOPICAL DAILY
Status: DISCONTINUED | OUTPATIENT
Start: 2025-05-01 | End: 2025-05-20 | Stop reason: HOSPADM

## 2025-05-01 RX ORDER — DONEPEZIL HYDROCHLORIDE 10 MG/1
10 TABLET, FILM COATED ORAL NIGHTLY
Status: DISCONTINUED | OUTPATIENT
Start: 2025-05-01 | End: 2025-05-20 | Stop reason: HOSPADM

## 2025-05-01 RX ORDER — LEVOTHYROXINE SODIUM 137 UG/1
137 TABLET ORAL DAILY
Status: DISCONTINUED | OUTPATIENT
Start: 2025-05-02 | End: 2025-05-20 | Stop reason: HOSPADM

## 2025-05-01 RX ORDER — POTASSIUM CHLORIDE 1500 MG/1
40 TABLET, EXTENDED RELEASE ORAL 2 TIMES DAILY WITH MEALS
Status: DISCONTINUED | OUTPATIENT
Start: 2025-05-01 | End: 2025-05-03

## 2025-05-01 RX ADMIN — Medication 1000 UNITS: at 14:34

## 2025-05-01 RX ADMIN — TRAZODONE HYDROCHLORIDE 50 MG: 50 TABLET ORAL at 20:40

## 2025-05-01 RX ADMIN — DONEPEZIL HYDROCHLORIDE 10 MG: 10 TABLET, FILM COATED ORAL at 20:40

## 2025-05-01 RX ADMIN — HYDROXYZINE HYDROCHLORIDE 50 MG: 50 TABLET ORAL at 20:39

## 2025-05-01 RX ADMIN — POTASSIUM CHLORIDE 40 MEQ: 1500 TABLET, EXTENDED RELEASE ORAL at 17:12

## 2025-05-01 ASSESSMENT — LIFESTYLE VARIABLES
HOW OFTEN DO YOU HAVE A DRINK CONTAINING ALCOHOL: PATIENT UNABLE TO ANSWER
HOW MANY STANDARD DRINKS CONTAINING ALCOHOL DO YOU HAVE ON A TYPICAL DAY: PATIENT UNABLE TO ANSWER

## 2025-05-01 NOTE — GROUP NOTE
Group Therapy Note    Date: 5/1/2025    Group Start Time: 1430  Group End Time: 1515  Group Topic: Psychoeducation    Amanda Valle CTRS    Psych-Ed/Relapse Prevention Group Note        Date: May 1, 2025 Start Time: 2:30pm  End Time: 315pm      Number of Participants in Group & Unit Census: 5/8    Topic: psych education group     Goal of Group: pt will demonstrate improved coping skills and improved interpersonal relationships      Comments:     Patient did not participate in Psych-Ed/Relapse Prevention group, despite staff encouragement and explanation of benefits.  Patient remain seclusive to self.  Q15 minute safety checks maintained for patient safety and will continue to encourage patient to attend unit programming.         Signature:  KIM BEATTY

## 2025-05-01 NOTE — BH NOTE
Contacted Dr. Kimball to report patient's morning blood pressure. Patient's morning blood pressure was 111/55 and heart rate was 55. Writer reported that patient is lethargic but responsive when woken up. Writer also informed Dr. Kimball of patient's TSH:306  drawn on 4/24/25 with no recheck present in documentation. Per Dr. Kimball, writer to complete EKG and order TSH lab.

## 2025-05-01 NOTE — CARE COORDINATION
[facility]HI Biopsychosocial Assessment    Current Level of Psychosocial Functioning     Independent   Dependent  XX  Minimal Assist     Comments:    Psychosocial High Risk Factors (check all that apply)    Unable to obtain meds   Chronic illness/pain    Substance abuse   Lack of Family Support   Financial stress   Isolation   Inadequate Community Resources  Suicide attempt(s)  Not taking medications   Victim of crime   Developmental Delay  Unable to manage personal needs    Age 65 or older XX  Homeless  No transportation   Readmission within 30 days  Unemployment  Traumatic Event    Comments:   Psychiatric Advanced Directives: n/a    Family to Involve in Treatment: Grand daughter Nabila JIMÉNEZ    Sexual Orientation: n/a     Patient Strengths: Patient has social support, insurance, housing    Patient Barriers: Not linked to [facility]      Opiate Education Provided: n/a       [facility]/mental health history: JENARO    Plan of Care   medication management, group/individual therapies, family meetings, psycho -education, treatment team meetings to assist with stabilization    Initial Discharge Plan:  TBD- possible SNF placement      Clinical Summary: Patient is a 79 year old female admitted to [facility] for dementia with behavioral disturbance. Patient has no hx of admissions. Patient was not able to provide logical answers to social work assessment. Patient's granddaughter Nabila is JESSY. Per chart review, patient is seeking SNF placement. Case management notes reflect Nabila JIMÉNEZ is interested in [facility]. Social work to follow up. Social work to provide support and assist with discharge planning.

## 2025-05-01 NOTE — BH NOTE
Contacted Dr. Kimball via [employer] to report patient's critical potassium of 2.9 and for further orders. No response at this time.

## 2025-05-01 NOTE — GROUP NOTE
Group Therapy Note    Date: 5/1/2025    Group Start Time: 1000  Group End Time: 1040  Group Topic: Cognitive Skills    Amanda Valle CTRS      Cognitive Skills Group Note        Date: May 1, 2025 Start Time: 10am  End Time:  1040 am       Number of Participants in Group & Unit Census:  5/10    Topic: cognitive skills     Goal of Group: pt will demonstrate improved coping skills and improved interpersonal relationships      Comments:     Patient did not participate in Cognitive Skills group, despite staff encouragement and explanation of benefits.  Patient remain seclusive to self.  Q15 minute safety checks maintained for patient safety and will continue to encourage patient to attend unit programming.              Signature:  KIM BEATTY

## 2025-05-01 NOTE — GROUP NOTE
Group Therapy Note    Date: 5/1/2025    Group Start Time: 1100  Group End Time: 1140  Group Topic: Psychotherapy     Isis Shepherd MSW        Group Therapy Note    Attendees: 3/10     Patient was offered group therapy today but declined to participate despite encouragement from staff.  1:1 was offered.    Discipline Responsible: /Counselor      Signature:  JENNIFER Browning

## 2025-05-01 NOTE — BH NOTE
CONFUSION> Pt is up and out in day room. Pt. Walking with walker and stand by assist. Pt. Is oriented to self and place only. Aware she is in the hospital but does not know why. Pt. Does not answer most questions appropriately. Asked pt how many children she had and says \" Oh, it costs about $12.50.\" Incongruent with conversation. Pt. Remains 1:1 for safety.

## 2025-05-01 NOTE — BH NOTE
EKG completed at 10:11am, unable to upload to patient's chart. Picture of EKG uploaded to patient media. Dr. Kimball aware, Dr. Kimball reported to be on unit in 30 minutes.     EKG completed as order. Results below.      Result 1:     Vent Rate:                                 56  bpm  HI Interval:                               *  ms  QRS Duration:                          96  ms  QT/Qtc                                  362/349 ms  P-R-T axes                    *     2    120     Junctional rhythm with premature supraventricular complexes and with occasional premature ventricular complexes.    Nonspecific ST and T wave abnormality     Abnormal EKG

## 2025-05-01 NOTE — BH NOTE
EKG completed as order. Results below.      Result 1:     Vent Rate:                                 56  bpm  AL Interval:                               *  ms  QRS Duration:                          96  ms  QT/Qtc                                  362/349 ms  P-R-T axes                    *     2    120     Junctional rhythm with premature supraventricular complexes and with occasional premature ventricular complexes.    Nonspecific ST and T wave abnormality     Abnormal EKG

## 2025-05-01 NOTE — CONSULTS
[facility]  Consult Note       NAME:  Leticia Tran  MEDICAL RECORD NUMBER:  [id number]  AGE: 79 y.o.   GENDER: female  : 1945  TODAY'S DATE:  2025    Subjective:      Leticia Tran is a 79 y.o. female with inpatient referral to [facility] for:  Skin tear to left forearm      Wound Identification:  Wound Type: traumatic  Contributing Factors:  confusion and agitation related to dementia    Wound History: Unable to obtain information from patient/unknown cause  Current Wound Care Treatment:  petroleum gauze and foam    Patient Goal of Care:  [x] Wound Healing  [] Odor Control  [] Palliative Care  [] Pain Control   [] Other:         PAST MEDICAL HISTORY        Diagnosis Date    Autoimmune thyroiditis     Dementia (HCC)     Osteoporosis        PAST SURGICAL HISTORY    No past surgical history on file.    FAMILY HISTORY    Family History   Family history unknown: Yes       SOCIAL HISTORY    Social History     Tobacco Use    Smoking status: Never    Smokeless tobacco: Never   Substance Use Topics    Alcohol use: Never    Drug use: Never         ALLERGIES    Allergies   Allergen Reactions    Latex      Band Aid    Tetanus Toxoid      seizures       HOME MEDICATIONS  Prior to Admission medications    Medication Sig Start Date End Date Taking? Authorizing Provider   QUEtiapine (SEROQUEL) 50 MG tablet Take 1 tablet by mouth nightly 25   Nitin Stout DO   donepezil (ARICEPT) 5 MG tablet Take 2 tablets by mouth nightly 23   Nathaniel Riggins MD   vitamin B-12 (CYANOCOBALAMIN) 1000 MCG tablet Take 1 tablet by mouth daily 23   Nathaniel Riggins MD   vitamin D3 (CHOLECALCIFEROL) 25 MCG (1000 UT) TABS tablet Take 1 tablet by mouth daily 23   Nathaniel Riggins MD   melatonin 1 MG tablet Take 1 tablet by mouth nightly as needed for Sleep 23   Nathaniel Riggins MD   levothyroxine (SYNTHROID) 137 MCG tablet Take 1 tablet by mouth 1/3/19   Provider,

## 2025-05-01 NOTE — H&P
Department of Psychiatry  Attending Physician Psychiatric Assessment   Patient: Leticia Tran  MRN: [id number]  Reason for Admission to Psychiatric Unit:  Threat to self requiring 24 hour professional observation  Threat to others requiring 24 hour professional observation  Acute disordered/bizarre behavior or psychomotor agitation or retardation;interferes with ADLs so that patient cannot function at a less intensive care level of care during evaluation and treatment   Patient has a dementing disorder and is admitted for eval or treatment of a psychiatric comorbidity (i.e. risk of suicide, violence, severe depression) warranting inpatient admission   A mental disorder causing major disability in social, interpersonal, occupational, and/or educational functioning that is leading to dangerous or life-threatening functioning, and that can only be addressed in an acute inpatient setting   A mental disorder that causes an inability to maintain adequate nurtrition or self-care, and family/community support cannot provide reliable, essential care, so that the patient cannont function at a less intensive level of care during evaluation and treatment   Concerns about patient's safety in the community  Past Mental Health Diagnosis: a history of  cognitive impairment   Triggering event or precipitating factor: Psych Treatment Noncompliance  Length of time/duration of triggering event: Months  Legal Status: Involuntary    CHIEF COMPLAINT:  Dementia with behavioral disturbance     History obtained from: Patient, electronic medical record          HISTORY OF PRESENT ILLNESS:    Leticia Tran is a 79 y.o. female who has a past medical history of dementia, autoimmune thyroiditis, and osteoporosis. Per documentation, patient reported to the ED minimally responsive with altered mentation. Prior to ED arrival, patient was found undressing in her yard. She was admitted medically for autoimmune thyroiditis and altered mental status in 
(36.9 °C)    No results for input(s): \"POCGLU\" in the last 72 hours.    Intake/Output Summary (Last 24 hours) at 5/1/2025 1407  Last data filed at 5/1/2025 1236  Gross per 24 hour   Intake 340 ml   Output --   Net 340 ml       General Appearance:  alert, well appearing, and in no acute distress, center obesity present  Mental status: oriented to person, place, and time with normal affect  Head:  normocephalic, atraumatic.  Eye: no icterus, redness, pupils equal and reactive, extraocular eye movements intact, conjunctiva clear  Ear: normal external ear, no discharge, hearing intact  Nose:  no drainage noted  Mouth: mucous membranes moist  Neck: supple, no carotid bruits, thyroid not palpable  Lungs: Bilateral equal air entry, clear to ausculation, no wheezing, rales or rhonchi, normal effort  Cardiovascular: normal rate, regular rhythm, no murmur, gallop, rub.  Abdomen: Soft, nontender, nondistended, normal bowel sounds, no hepatomegaly or splenomegaly, has Olvera's catheter  Neurologic: There are no new focal motor or sensory deficits, normal muscle tone and bulk, no abnormal sensation, normal speech, cranial nerves II through XII grossly intact  Skin: No gross lesions, rashes, bruising or bleeding on exposed skin area  Extremities:  peripheral pulses palpable, no pedal edema or calf pain with palpation  Psych:     Investigations:      Laboratory Testing:  Recent Results (from the past 24 hours)   Basic Metabolic Panel    Collection Time: 05/01/25 11:14 AM   Result Value Ref Range    Sodium 146 (H) 136 - 145 mmol/L    Potassium 2.9 (LL) 3.7 - 5.3 mmol/L    Chloride 107 98 - 107 mmol/L    CO2 26 20 - 31 mmol/L    Anion Gap 13 9 - 16 mmol/L    Glucose 97 74 - 99 mg/dL    BUN 13 8 - 23 mg/dL    Creatinine 1.0 0.7 - 1.2 mg/dL    Est, Glom Filt Rate 58 (L) >60 mL/min/1.73m2    Calcium 9.2 8.6 - 10.4 mg/dL   TSH reflex to FT4    Collection Time: 05/01/25 11:14 AM   Result Value Ref Range    .00 (H) 0.27 - 4.20

## 2025-05-01 NOTE — BH NOTE
Writer and 1:1 encouraging fluids for patient. Patient took a sip of water and proceeded to say \"no\" when encouraged to drink more fluids.

## 2025-05-02 LAB
ANION GAP SERPL CALCULATED.3IONS-SCNC: 9 MMOL/L (ref 9–16)
BUN SERPL-MCNC: 25 MG/DL (ref 8–23)
CALCIUM SERPL-MCNC: 9.5 MG/DL (ref 8.6–10.4)
CHLORIDE SERPL-SCNC: 108 MMOL/L (ref 98–107)
CO2 SERPL-SCNC: 29 MMOL/L (ref 20–31)
CREAT SERPL-MCNC: 1.3 MG/DL (ref 0.7–1.2)
GFR, ESTIMATED: 42 ML/MIN/1.73M2
GLUCOSE SERPL-MCNC: 134 MG/DL (ref 74–99)
POTASSIUM SERPL-SCNC: 3.4 MMOL/L (ref 3.7–5.3)
SODIUM SERPL-SCNC: 146 MMOL/L (ref 136–145)

## 2025-05-02 PROCEDURE — 6370000000 HC RX 637 (ALT 250 FOR IP): Performed by: INTERNAL MEDICINE

## 2025-05-02 PROCEDURE — 80048 BASIC METABOLIC PNL TOTAL CA: CPT

## 2025-05-02 PROCEDURE — 97535 SELF CARE MNGMENT TRAINING: CPT

## 2025-05-02 PROCEDURE — 36415 COLL VENOUS BLD VENIPUNCTURE: CPT

## 2025-05-02 PROCEDURE — 99232 SBSQ HOSP IP/OBS MODERATE 35: CPT | Performed by: INTERNAL MEDICINE

## 2025-05-02 PROCEDURE — 97166 OT EVAL MOD COMPLEX 45 MIN: CPT

## 2025-05-02 PROCEDURE — APPSS30 APP SPLIT SHARED TIME 16-30 MINUTES

## 2025-05-02 PROCEDURE — 99232 SBSQ HOSP IP/OBS MODERATE 35: CPT | Performed by: PSYCHIATRY & NEUROLOGY

## 2025-05-02 PROCEDURE — 1240000000 HC EMOTIONAL WELLNESS R&B

## 2025-05-02 PROCEDURE — 97162 PT EVAL MOD COMPLEX 30 MIN: CPT

## 2025-05-02 RX ADMIN — LEVOTHYROXINE SODIUM 137 MCG: 0.14 TABLET ORAL at 05:53

## 2025-05-02 RX ADMIN — POTASSIUM CHLORIDE 40 MEQ: 1500 TABLET, EXTENDED RELEASE ORAL at 17:26

## 2025-05-02 RX ADMIN — POTASSIUM CHLORIDE 40 MEQ: 1500 TABLET, EXTENDED RELEASE ORAL at 09:12

## 2025-05-02 RX ADMIN — Medication 1000 UNITS: at 09:12

## 2025-05-02 RX ADMIN — DONEPEZIL HYDROCHLORIDE 10 MG: 10 TABLET, FILM COATED ORAL at 20:44

## 2025-05-02 RX ADMIN — CYANOCOBALAMIN TAB 1000 MCG 1000 MCG: 1000 TAB at 09:12

## 2025-05-02 NOTE — BH NOTE
Dressing noted on L forearm dated 5/1. One side of dressing lifting from skin, new dressing applied. Skin tear clean with scant bleeding.

## 2025-05-02 NOTE — GROUP NOTE
Group Therapy Note    Date: 5/2/2025    Group Start Time: 1330  Group End Time: 1415  Group Topic: Recreational    [facility] Amanda Cagle CTRS    Recreation Group Note        Date: May 2, 2025 Start Time: 1:30pm  End Time:  215pm      Number of Participants in Group & Unit Census:  4/7    Topic: recreation group     Goal of Group: pt will demonstrate improved coping skills and improved leisure awareness       Comments:     Patient did not participate in Recreation group, despite staff encouragement and explanation of benefits.  Patient remain seclusive to self.  Q15 minute safety checks maintained for patient safety and will continue to encourage patient to attend unit programming.              Signature:  KIM BEATTY

## 2025-05-02 NOTE — BH NOTE
Writer took patient to the bathroom to try to get her to void patient sat on the toilet with no results patient assisted back to bed so bladder scan can be completed

## 2025-05-02 NOTE — BH NOTE
JESSY Dahl will be here tomorrow to sign admission paperwork and bring a copy of POA paperwork. Ms. Dahl provided an update on how patient is doing today, had no further questions.

## 2025-05-02 NOTE — BH NOTE
Bladder scan completed with 27mls noted patient reports she does  not feel like she has to void at this time no distention noted will continue to encouraged fluids on coming nurse aware

## 2025-05-02 NOTE — GROUP NOTE
Group Therapy Note    Date: 5/2/2025    Group Start Time: 1100  Group End Time: 1135  Group Topic: Cognitive Skills    Amanda Valle CTRS    Cognitive Skills Group Note        Date: May 2, 2025 Start Time: 11am  End Time:  1135am      Number of Participants in Group & Unit Census:  3/7    Topic: cognitive skills     Goal of Group: pt will demonstrate improved coping skills and improved interpersonal relationships      Comments:     Patient did not participate in Cognitive Skills group, despite staff encouragement and explanation of benefits.  Patient remain seclusive to self.  Q15 minute safety checks maintained for patient safety and will continue to encourage patient to attend unit programming.              Signature:  KIM BEATTY

## 2025-05-02 NOTE — GROUP NOTE
Group Therapy Note    Date: 5/2/2025    Group Start Time: 1000  Group End Time: 1030  Group Topic: Psychotherapy     Isis Shepherd MSW        Group Therapy Note    Attendees: 4/8     Patient was offered group therapy today but declined to participate despite encouragement from staff.  1:1 was offered.    Discipline Responsible: /Counselor      Signature:  JENNIFER Browning

## 2025-05-02 NOTE — BH NOTE
Patient voided scant amount in hat. Patient did throw toilet paper in hate so an inaccurate amount of urine was observed. Approximately 100 mL or less.

## 2025-05-02 NOTE — CARE COORDINATION
Writer spoke to patient's granddaughter and JESSY Dahl. She states she will come sign consents for treatment on 5/3. She verifies family is interested in SNF placement. She is interested in a locked unit and requests referral be sent to [facility]. She gives verbal consent for  to send referral to other facilities.     PASRR submitted via electronic HENS system.

## 2025-05-02 NOTE — GROUP NOTE
Group Therapy Note    Date: 5/2/2025    Group Start Time: 0900  Group End Time: 0915  Group Topic: Nursing    Betty Garcia, MAYNOR; Ruma Eisenberg, RN    Nursing  Group Note        Date: May 2, 2025 Start Time: 9am  End Time:  0915      Number of Participants in Group & Unit Census:  4/8    Topic: Goal Group    Goal of Group:Identify a goal for today       Comments:     Patient did not participate in  Nursing  group, despite staff encouragement and explanation of benefits.  Patient remain seclusive to self.  Q15 minute safety checks maintained for patient safety and will continue to encourage patient to attend unit programming.

## 2025-05-02 NOTE — BH NOTE
Patient voided 100 mL of urine in toilet. Immediately following void - bladder scanned from L to R. Bladder scanner showed 0 mL. Patient walked to day area to watch TV. Bladder does not looked distended and patient denies pain.

## 2025-05-03 PROCEDURE — 99232 SBSQ HOSP IP/OBS MODERATE 35: CPT | Performed by: INTERNAL MEDICINE

## 2025-05-03 PROCEDURE — 6370000000 HC RX 637 (ALT 250 FOR IP): Performed by: INTERNAL MEDICINE

## 2025-05-03 PROCEDURE — 1240000000 HC EMOTIONAL WELLNESS R&B

## 2025-05-03 PROCEDURE — 6370000000 HC RX 637 (ALT 250 FOR IP): Performed by: PSYCHIATRY & NEUROLOGY

## 2025-05-03 RX ADMIN — POTASSIUM BICARBONATE 25 MEQ: 978 TABLET, EFFERVESCENT ORAL at 12:45

## 2025-05-03 RX ADMIN — CYANOCOBALAMIN TAB 1000 MCG 1000 MCG: 1000 TAB at 12:45

## 2025-05-03 RX ADMIN — Medication 1000 UNITS: at 12:45

## 2025-05-03 RX ADMIN — HYDROXYZINE HYDROCHLORIDE 50 MG: 50 TABLET ORAL at 22:08

## 2025-05-03 RX ADMIN — LEVOTHYROXINE SODIUM 137 MCG: 0.14 TABLET ORAL at 06:35

## 2025-05-03 RX ADMIN — TRAZODONE HYDROCHLORIDE 50 MG: 50 TABLET ORAL at 22:08

## 2025-05-03 RX ADMIN — DONEPEZIL HYDROCHLORIDE 10 MG: 10 TABLET, FILM COATED ORAL at 22:08

## 2025-05-03 NOTE — BH NOTE
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 Patient was labile between combative and cooperative while at [facility]. Upon admission patient was lethargic and unable to answer any assessment questions. Patient has pablo catheter.  Estimated Length of Stay:  5-7 days  Estimated Discharge Date Update:   to be determined by physician    PATIENT STRENGTHS:  Patient Strengths:   Patient Strengths and Limitations:Limitations: Perceives need for assistance with self-care  Addictive Behavior:Addictive Behavior  In the Past 3 Months, Have You Felt or Has Someone Told You That You Have a Problem With  :  (JENARO)  Medical Problems:  Past Medical History:   Diagnosis Date    Autoimmune thyroiditis     Dementia (HCC)     Osteoporosis        Risk:  Fall Risk   Bar Scale Bar Scale Score: 18  BVC    Change in scores:  No Changes to plan of Care:  No    Status EXAM:   Mental Status and Behavioral Exam  Normal: No  Level of Assistance: Set up  Facial Expression: Sad  Affect: Congruent  Level of Consciousness: Confused  Frequency of Checks: 1 staff: 1 patient  - continuous  Mood:Normal: No  Mood: Depressed, Anxious, Helpless, Irritable, Sad  Motor Activity:Normal: No  Motor Activity: Decreased, Unusual posture/gait  Eye Contact: Fair  Observed Behavior: Cooperative  Sexual Misconduct History: Current - no  Preception: [place] to person, [place] to place  Attention:Normal: No  Attention: Unable to concentrate  Thought Processes: Circumstantial  Thought Content:Normal: No  Thought Content: Delusions  Depression Symptoms: Change in energy level, Feelings of helplessness, Isolative  Anxiety Symptoms: Generalized  Keila

## 2025-05-03 NOTE — BH NOTE
Dr. Kimball provided verbal orders for Effer K disintegrating tablets 25mg BID with discontinuation of potassium 40mEq BID because patient has trouble swallowing pills.

## 2025-05-03 NOTE — BH NOTE
Patient again refused to get up to eat breakfast at this time, continues to state \"I need to rest\".

## 2025-05-03 NOTE — BH NOTE
Patient voided in toilet with hat in place to measure output. Per 1:1 output of 500mL of urine noted. Patient bladder scanned after void. Bladder scanner reporting 5mL retained.

## 2025-05-03 NOTE — BH NOTE
JESSY Dahl provided update. Confirmed that she will be here at visitation hours with her sister to drop off POA paperwork and sign admission paperwork.

## 2025-05-03 NOTE — BH NOTE
Patient resting in bed with periodic snores. She was awoken easily asked if she wanted meds and breakfast. Patient stated \"Not right now\". Will continue to attempt.

## 2025-05-03 NOTE — BH NOTE
JESSY Dahl signed voluntary and treatment consent form at 1702. She also provided a copy of her POA paperwork for the state [facility] and Living Will, Both forms were placed in patients chart.

## 2025-05-03 NOTE — BH NOTE
Patient voided in toilet. She threw toilet paper in hat so some of the urine was absorbed. Hat reports 150mL per 1:1 sitter. Patient unable to be bladder scanned, by the time bladder scanner came to unit patient started to eat her lunch meal.

## 2025-05-04 LAB
ANION GAP SERPL CALCULATED.3IONS-SCNC: 9 MMOL/L (ref 9–16)
BUN SERPL-MCNC: 23 MG/DL (ref 8–23)
CALCIUM SERPL-MCNC: 8.9 MG/DL (ref 8.6–10.4)
CHLORIDE SERPL-SCNC: 109 MMOL/L (ref 98–107)
CO2 SERPL-SCNC: 28 MMOL/L (ref 20–31)
CREAT SERPL-MCNC: 0.9 MG/DL (ref 0.7–1.2)
GFR, ESTIMATED: 65 ML/MIN/1.73M2
GLUCOSE SERPL-MCNC: 110 MG/DL (ref 74–99)
POTASSIUM SERPL-SCNC: 3.9 MMOL/L (ref 3.7–5.3)
SODIUM SERPL-SCNC: 146 MMOL/L (ref 136–145)

## 2025-05-04 PROCEDURE — 99232 SBSQ HOSP IP/OBS MODERATE 35: CPT | Performed by: INTERNAL MEDICINE

## 2025-05-04 PROCEDURE — 6370000000 HC RX 637 (ALT 250 FOR IP): Performed by: INTERNAL MEDICINE

## 2025-05-04 PROCEDURE — 36415 COLL VENOUS BLD VENIPUNCTURE: CPT

## 2025-05-04 PROCEDURE — 1240000000 HC EMOTIONAL WELLNESS R&B

## 2025-05-04 PROCEDURE — 80048 BASIC METABOLIC PNL TOTAL CA: CPT

## 2025-05-04 PROCEDURE — 6370000000 HC RX 637 (ALT 250 FOR IP): Performed by: PSYCHIATRY & NEUROLOGY

## 2025-05-04 RX ADMIN — Medication 1000 UNITS: at 08:27

## 2025-05-04 RX ADMIN — POTASSIUM BICARBONATE 25 MEQ: 978 TABLET, EFFERVESCENT ORAL at 20:58

## 2025-05-04 RX ADMIN — CYANOCOBALAMIN TAB 1000 MCG 1000 MCG: 1000 TAB at 08:27

## 2025-05-04 RX ADMIN — TRAZODONE HYDROCHLORIDE 50 MG: 50 TABLET ORAL at 20:58

## 2025-05-04 RX ADMIN — LEVOTHYROXINE SODIUM 137 MCG: 0.14 TABLET ORAL at 06:45

## 2025-05-04 RX ADMIN — DONEPEZIL HYDROCHLORIDE 10 MG: 10 TABLET, FILM COATED ORAL at 20:58

## 2025-05-04 RX ADMIN — POTASSIUM BICARBONATE 25 MEQ: 978 TABLET, EFFERVESCENT ORAL at 08:28

## 2025-05-04 NOTE — BH NOTE
1:1 Note  Patient resting quietly in bed with eyes closed. Breaths are even and unlabored. No apparent distress noted. 1:1 maintained as ordered for patient safety.

## 2025-05-04 NOTE — GROUP NOTE
Group Therapy Note    Date: 5/4/2025    Group Start Time: 1100  Group End Time: 1200  Group Topic: Spirituality Group    [facility]    Dg Antonio, RN        Group Therapy Note    Attendees: 2/6    Attended mass as offered by [facility] via live televised service presentation.       Patient's Goal:  spiritual growth    Notes:  actively engaged    Status After Intervention:  Improved    Participation Level: Active Listener    Participation Quality: Appropriate      Speech:  normal      Thought Process/Content: Logical      Affective Functioning: Congruent      Mood: depressed and irritable      Level of consciousness:  Alert, Oriented x4, and Attentive      Response to Learning: Able to verbalize current knowledge/experience      Endings: None Reported    Modes of Intervention: Education      Discipline Responsible: Spiritual Care      Signature:  Dg Antonio RN

## 2025-05-04 NOTE — GROUP NOTE
Group Therapy Note    Date: 5/4/2025    Group Start Time: 1000  Group End Time: 1005  Group Topic: Group Documentation    [facility]    Kenzie Soto LPN        Group Therapy Note       Patient did not participate in Goals  group, despite staff encouragement and explanation of benefits.  Patient remain seclusive to self.  Q15 minute safety checks maintained for patient safety and will continue to encourage patient to attend unit programming.        Signature:  Kenzie Soto LPN

## 2025-05-05 PROBLEM — E03.9 MYXEDEMA: Status: ACTIVE | Noted: 2025-05-05

## 2025-05-05 LAB
EKG Q-T INTERVAL: 362 MS
EKG QRS DURATION: 96 MS
EKG QTC CALCULATION (BAZETT): 349 MS
EKG R AXIS: 2 DEGREES
EKG T AXIS: 120 DEGREES
EKG VENTRICULAR RATE: 56 BPM

## 2025-05-05 PROCEDURE — 6370000000 HC RX 637 (ALT 250 FOR IP): Performed by: PSYCHIATRY & NEUROLOGY

## 2025-05-05 PROCEDURE — 1240000000 HC EMOTIONAL WELLNESS R&B

## 2025-05-05 PROCEDURE — 6370000000 HC RX 637 (ALT 250 FOR IP): Performed by: INTERNAL MEDICINE

## 2025-05-05 PROCEDURE — APPSS30 APP SPLIT SHARED TIME 16-30 MINUTES

## 2025-05-05 PROCEDURE — 99232 SBSQ HOSP IP/OBS MODERATE 35: CPT | Performed by: PSYCHIATRY & NEUROLOGY

## 2025-05-05 PROCEDURE — 99232 SBSQ HOSP IP/OBS MODERATE 35: CPT | Performed by: INTERNAL MEDICINE

## 2025-05-05 RX ORDER — DOXEPIN HYDROCHLORIDE 10 MG/1
10 CAPSULE ORAL NIGHTLY
Status: DISCONTINUED | OUTPATIENT
Start: 2025-05-05 | End: 2025-05-05

## 2025-05-05 RX ORDER — DOXEPIN HYDROCHLORIDE 10 MG/1
10 CAPSULE ORAL NIGHTLY PRN
Status: DISCONTINUED | OUTPATIENT
Start: 2025-05-05 | End: 2025-05-20 | Stop reason: HOSPADM

## 2025-05-05 RX ADMIN — TRAZODONE HYDROCHLORIDE 50 MG: 50 TABLET ORAL at 20:30

## 2025-05-05 RX ADMIN — LEVOTHYROXINE SODIUM 137 MCG: 0.14 TABLET ORAL at 08:58

## 2025-05-05 RX ADMIN — DONEPEZIL HYDROCHLORIDE 10 MG: 10 TABLET, FILM COATED ORAL at 20:30

## 2025-05-05 RX ADMIN — CYANOCOBALAMIN TAB 1000 MCG 1000 MCG: 1000 TAB at 08:58

## 2025-05-05 RX ADMIN — HYDROXYZINE HYDROCHLORIDE 50 MG: 50 TABLET ORAL at 01:42

## 2025-05-05 RX ADMIN — POTASSIUM BICARBONATE 25 MEQ: 978 TABLET, EFFERVESCENT ORAL at 08:58

## 2025-05-05 RX ADMIN — POTASSIUM BICARBONATE 25 MEQ: 978 TABLET, EFFERVESCENT ORAL at 20:30

## 2025-05-05 RX ADMIN — Medication 1000 UNITS: at 08:58

## 2025-05-05 ASSESSMENT — LIFESTYLE VARIABLES
HOW MANY STANDARD DRINKS CONTAINING ALCOHOL DO YOU HAVE ON A TYPICAL DAY: PATIENT UNABLE TO ANSWER
HOW OFTEN DO YOU HAVE A DRINK CONTAINING ALCOHOL: PATIENT UNABLE TO ANSWER

## 2025-05-05 NOTE — GROUP NOTE
Group Therapy Note    Date: 5/5/2025    Group Start Time: 1000  Group End Time: 1020  Group Topic: Psychotherapy     Isis Shepherd MSW        Group Therapy Note    Attendees: 1/8     Patient was offered group therapy today but declined to participate despite encouragement from staff.  1:1 was offered.    Discipline Responsible: /Counselor      Signature:  JENNIFER Browning

## 2025-05-05 NOTE — BH NOTE
Goals Group Note        Date: May 5, 2025 Start Time: 9am  End Time: 9:15am      Number of Participants in Group & Unit Census:  2/8    Topic: Goals    Goal of Group: to discuss goals for the day      Comments:     Patient did not participate in goals group, despite staff encouragement and explanation of benefits.  Patient remain seclusive to self.  Q15 minute safety checks maintained for patient safety and will continue to encourage patient to attend unit programming.

## 2025-05-05 NOTE — GROUP NOTE
Group Therapy Note    Date: 5/5/2025    Group Start Time: 1100  Group End Time: 1135  Group Topic: Cognitive Skills    Amanda Valle CTRS    Cognitive Skills Group Note        Date: May 5, 2025 Start Time: 11am  End Time:  1135 am      Number of Participants in Group & Unit Census:  1/8    Topic: cognitive skills     Goal of Group: pt will demonstrate improved coping skills and improved interpersonal relationships      Comments:     Patient did not participate in Cognitive Skills group, despite staff encouragement and explanation of benefits.  Patient remain seclusive to self.  Q15 minute safety checks maintained for patient safety and will continue to encourage patient to attend unit programming.              Signature:  KIM BEATTY

## 2025-05-05 NOTE — BH NOTE
Health/Wellness Group Note        Date: May 5, 2025 Start Time: 2:45pm  End Time: 2:50pm      Number of Participants in Group & Unit Census:  0/8    Topic: Coping Skills    Goal of Group: to discuss and explore coping skills      Comments:     Patient did not participate in Health/Wellness group, despite staff encouragement and explanation of benefits.  Patient remain seclusive to self.  Q15 minute safety checks maintained for patient safety and will continue to encourage patient to attend unit programming.

## 2025-05-05 NOTE — CARE COORDINATION
Writer received message from Michelle with [facility]. Patient denied.    Jasmyne from [facility] states they are waiting on a female bed.

## 2025-05-05 NOTE — CARE COORDINATION
[facility] ref[facility] the following facilities:  [facility]  [facility]  [facility]

## 2025-05-06 LAB
ANION GAP SERPL CALCULATED.3IONS-SCNC: 9 MMOL/L (ref 9–16)
BUN SERPL-MCNC: 25 MG/DL (ref 8–23)
CALCIUM SERPL-MCNC: 9.9 MG/DL (ref 8.6–10.4)
CHLORIDE SERPL-SCNC: 101 MMOL/L (ref 98–107)
CO2 SERPL-SCNC: 31 MMOL/L (ref 20–31)
CREAT SERPL-MCNC: 1.1 MG/DL (ref 0.7–1.2)
GFR, ESTIMATED: 51 ML/MIN/1.73M2
GLUCOSE SERPL-MCNC: 98 MG/DL (ref 74–99)
POTASSIUM SERPL-SCNC: 4.2 MMOL/L (ref 3.7–5.3)
SODIUM SERPL-SCNC: 141 MMOL/L (ref 136–145)

## 2025-05-06 PROCEDURE — 80048 BASIC METABOLIC PNL TOTAL CA: CPT

## 2025-05-06 PROCEDURE — 99232 SBSQ HOSP IP/OBS MODERATE 35: CPT | Performed by: PSYCHIATRY & NEUROLOGY

## 2025-05-06 PROCEDURE — 99232 SBSQ HOSP IP/OBS MODERATE 35: CPT | Performed by: INTERNAL MEDICINE

## 2025-05-06 PROCEDURE — 6370000000 HC RX 637 (ALT 250 FOR IP): Performed by: INTERNAL MEDICINE

## 2025-05-06 PROCEDURE — 97116 GAIT TRAINING THERAPY: CPT

## 2025-05-06 PROCEDURE — 36415 COLL VENOUS BLD VENIPUNCTURE: CPT

## 2025-05-06 PROCEDURE — 6370000000 HC RX 637 (ALT 250 FOR IP): Performed by: PSYCHIATRY & NEUROLOGY

## 2025-05-06 PROCEDURE — APPSS30 APP SPLIT SHARED TIME 16-30 MINUTES

## 2025-05-06 PROCEDURE — 1240000000 HC EMOTIONAL WELLNESS R&B

## 2025-05-06 RX ADMIN — TRAZODONE HYDROCHLORIDE 50 MG: 50 TABLET ORAL at 20:40

## 2025-05-06 RX ADMIN — LEVOTHYROXINE SODIUM 137 MCG: 0.14 TABLET ORAL at 06:18

## 2025-05-06 RX ADMIN — ACETAMINOPHEN 650 MG: 325 TABLET ORAL at 10:32

## 2025-05-06 RX ADMIN — POTASSIUM BICARBONATE 25 MEQ: 978 TABLET, EFFERVESCENT ORAL at 20:40

## 2025-05-06 RX ADMIN — POTASSIUM BICARBONATE 25 MEQ: 978 TABLET, EFFERVESCENT ORAL at 08:29

## 2025-05-06 RX ADMIN — DONEPEZIL HYDROCHLORIDE 10 MG: 10 TABLET, FILM COATED ORAL at 20:40

## 2025-05-06 RX ADMIN — CYANOCOBALAMIN TAB 1000 MCG 1000 MCG: 1000 TAB at 08:29

## 2025-05-06 RX ADMIN — Medication 1000 UNITS: at 08:29

## 2025-05-06 ASSESSMENT — PAIN DESCRIPTION - LOCATION: LOCATION: ELBOW

## 2025-05-06 ASSESSMENT — PAIN DESCRIPTION - DESCRIPTORS: DESCRIPTORS: ACHING

## 2025-05-06 ASSESSMENT — PAIN DESCRIPTION - ORIENTATION: ORIENTATION: LEFT

## 2025-05-06 NOTE — GROUP NOTE
Group Therapy Note    Date: 5/6/2025    Group Start Time: 1530  Group End Time: 1550  Group Topic: Nursing    Betty Garcia, RN    Nursing  Group Note        Date: May 6, 2025 Start Time:  1530   End Time:  1550      Number of Participants in Group & Unit Census:  4/7    Topic: To improve health and wellness    Goal of Group:Engage and work-put alongside the 10 minute sitting workout video on Youtube in the Day Area.      Comments:     Patient did not participate in  Nursing  group, despite staff encouragement and explanation of benefits.  Patient remain seclusive to self.  Q15 minute safety checks maintained for patient safety and will continue to encourage patient to attend unit programming.

## 2025-05-06 NOTE — GROUP NOTE
Group Therapy Note    Date: 5/6/2025    Group Start Time: 1000  Group End Time: 1020  Group Topic: Psychotherapy     Isis Shepherd MSW        Group Therapy Note    Attendees: 0/7     Patient was offered group therapy today but declined to participate despite encouragement from staff.  1:1 was offered.    Discipline Responsible: /Counselor      Signature:  JENNIFER Browning

## 2025-05-06 NOTE — CARE COORDINATION
Writer spoke to Johann at [facility]. Patient has been denied.    Writer spoke to admissions at [facility]. Awaiting for chart review to be completed.

## 2025-05-06 NOTE — GROUP NOTE
Group Therapy Note    Date: 5/6/2025    Group Start Time: 1100  Group End Time: 1130  Group Topic: Cognitive Skills    Amanda Valle CTRS        Group Therapy Note    Attendees: 2/7       Patient's Goal:  pt will demonstrate improved coping skills and improved interpersonal relationships    Notes:   pt was pleasant but confused. Pt needed encouragement    Status After Intervention:  Improved    Participation Level: Active Listener and Interactive    Participation Quality: Appropriate      Speech:  normal      Thought Process/Content: slow to process, confused      Affective Functioning: Flat      Mood:  cooperative      Level of consciousness:  Alert      Response to Learning: Progressing to goal      Endings: None Reported    Modes of Intervention: Support, Socialization, and Activity      Discipline Responsible: Psychoeducational Specialist      Signature:  KIM BEATTY

## 2025-05-06 NOTE — GROUP NOTE
Group Therapy Note    Date: 5/6/2025    Group Start Time: 0900  Group End Time: 0915  Group Topic: Nursing    Betty Garcia, RN    Nursing  Group Note        Date: May 6, 2025 Start Time: 9am  End Time:  0915      Number of Participants in Group & Unit Census:  0/7    Topic: Goals Group    Goal of Group:Identify goal for today       Comments:     Patient did not participate in  Nursing  group, despite staff encouragement and explanation of benefits.  Patient remain seclusive to self.  Q15 minute safety checks maintained for patient safety and will continue to encourage patient to attend unit programming.

## 2025-05-06 NOTE — GROUP NOTE
Group Therapy Note    Date: 5/6/2025    Group Start Time: 1330  Group End Time: 1400  Group Topic: recreation group     [facility] Amanda Cruz CTRS        Group Therapy Note    Attendees: 1/7       Patient's Goal:  pt will demonstrate improved coping skills and improved leisure awareness     Notes:   pt was pleasant but confused. Pt needed encouragement    Status After Intervention:  Improved    Participation Level: Active Listener and Interactive    Participation Quality: letharigc       Speech:  disorganized , mumbles      Thought Process/Content: slow to process, confused      Affective Functioning: Flat      Mood: lethargic       Level of consciousness:  Alert      Response to Learning: Progressing to goal      Endings: None Reported    Modes of Intervention: Support, Socialization, and Activity      Discipline Responsible: Psychoeducational Specialist      Signature:  KIM BEATTY

## 2025-05-07 PROCEDURE — 1240000000 HC EMOTIONAL WELLNESS R&B

## 2025-05-07 PROCEDURE — 99231 SBSQ HOSP IP/OBS SF/LOW 25: CPT | Performed by: PSYCHIATRY & NEUROLOGY

## 2025-05-07 PROCEDURE — 99212 OFFICE O/P EST SF 10 MIN: CPT

## 2025-05-07 PROCEDURE — 6370000000 HC RX 637 (ALT 250 FOR IP): Performed by: INTERNAL MEDICINE

## 2025-05-07 PROCEDURE — APPSS30 APP SPLIT SHARED TIME 16-30 MINUTES

## 2025-05-07 PROCEDURE — 99232 SBSQ HOSP IP/OBS MODERATE 35: CPT | Performed by: INTERNAL MEDICINE

## 2025-05-07 PROCEDURE — 6370000000 HC RX 637 (ALT 250 FOR IP): Performed by: PSYCHIATRY & NEUROLOGY

## 2025-05-07 RX ADMIN — POTASSIUM BICARBONATE 25 MEQ: 978 TABLET, EFFERVESCENT ORAL at 09:04

## 2025-05-07 RX ADMIN — LEVOTHYROXINE SODIUM 137 MCG: 0.14 TABLET ORAL at 07:07

## 2025-05-07 RX ADMIN — CYANOCOBALAMIN TAB 1000 MCG 1000 MCG: 1000 TAB at 09:04

## 2025-05-07 RX ADMIN — POTASSIUM BICARBONATE 25 MEQ: 978 TABLET, EFFERVESCENT ORAL at 20:42

## 2025-05-07 RX ADMIN — Medication 1000 UNITS: at 09:04

## 2025-05-07 RX ADMIN — DONEPEZIL HYDROCHLORIDE 10 MG: 10 TABLET, FILM COATED ORAL at 20:42

## 2025-05-07 RX ADMIN — TRAZODONE HYDROCHLORIDE 50 MG: 50 TABLET ORAL at 20:42

## 2025-05-07 RX ADMIN — Medication 1.5 MG: at 20:42

## 2025-05-07 NOTE — GROUP NOTE
Group Therapy Note    Date: 5/7/2025    Group Start Time: 1000  Group End Time: 1020  Group Topic: Psychotherapy     Isis Shepherd MSW        Group Therapy Note    Attendees: 0/8     Patient was offered group therapy today but declined to participate despite encouragement from staff.  1:1 was offered.    Discipline Responsible: /Counselor      Signature:  JENNIFER Browning

## 2025-05-07 NOTE — CARE COORDINATION
Patient has been accepted to [facility] per Shy from admissions. Shy to start pre cert.    Update provided to JESSY Dahl. She states she will tour the facility tomorrow afternoon.

## 2025-05-07 NOTE — GROUP NOTE
Group Therapy Note    Date: 5/7/2025    Group Start Time: 1050  Group End Time: 1120  Group Topic: Cognitive Skills    Amanda Valle CTRS        Group Therapy Note    Attendees: 2/8       Patient's Goal:  pt will demonstrate improved coping skills and improved interpersonal relationships    Notes:  pt was pleasant but confused    Status After Intervention:  Unchanged    Participation Level: Interactive    Participation Quality: minimal      Speech:  mumbles        Thought Process/Content: confused      Affective Functioning: Congruent      Mood:  pleasant but confused      Level of consciousness:  Alert      Response to Learning: Able to verbalize current knowledge/experience, Capable of insight, and Progressing to goal      Endings: None Reported    Modes of Intervention: Support, Socialization, and Activity      Discipline Responsible: Psychoeducational Specialist      Signature:  KIM BEATTY

## 2025-05-07 NOTE — GROUP NOTE
Group Therapy Note    Date: 5/7/2025    Group Start Time: 1330  Group End Time: 1420  Group Topic: Focus Group    Amanda Valle CTRS    focus  Group Note        Date: May 7, 2025 Start Time: 1:30pm  End Time:  220pm      Number of Participants in Group & Unit Census:  2/8    Topic: focus group     Goal of Group: pt will improve coping skills and improve concentration       Comments:     Patient did not participate in  focus  group, despite staff encouragement and explanation of benefits.  Patient remain seclusive to self.  Q15 minute safety checks maintained for patient safety and will continue to encourage patient to attend unit programming.              Signature:  KIM BEATTY

## 2025-05-07 NOTE — CARE COORDINATION
Referrals sent to the following facilities for SNF placement:    [facility]  [facility]  [facility]  [facility]    Writer left  for Lelia with admissions at [facility].

## 2025-05-08 PROCEDURE — 6370000000 HC RX 637 (ALT 250 FOR IP): Performed by: INTERNAL MEDICINE

## 2025-05-08 PROCEDURE — 99232 SBSQ HOSP IP/OBS MODERATE 35: CPT | Performed by: INTERNAL MEDICINE

## 2025-05-08 PROCEDURE — 97535 SELF CARE MNGMENT TRAINING: CPT

## 2025-05-08 PROCEDURE — 99232 SBSQ HOSP IP/OBS MODERATE 35: CPT | Performed by: PSYCHIATRY & NEUROLOGY

## 2025-05-08 PROCEDURE — 97530 THERAPEUTIC ACTIVITIES: CPT

## 2025-05-08 PROCEDURE — 6370000000 HC RX 637 (ALT 250 FOR IP): Performed by: PSYCHIATRY & NEUROLOGY

## 2025-05-08 PROCEDURE — 1240000000 HC EMOTIONAL WELLNESS R&B

## 2025-05-08 RX ADMIN — LEVOTHYROXINE SODIUM 137 MCG: 0.14 TABLET ORAL at 09:43

## 2025-05-08 RX ADMIN — TRAZODONE HYDROCHLORIDE 50 MG: 50 TABLET ORAL at 20:47

## 2025-05-08 RX ADMIN — DONEPEZIL HYDROCHLORIDE 10 MG: 10 TABLET, FILM COATED ORAL at 20:47

## 2025-05-08 RX ADMIN — CYANOCOBALAMIN TAB 1000 MCG 1000 MCG: 1000 TAB at 09:43

## 2025-05-08 RX ADMIN — Medication 1000 UNITS: at 09:43

## 2025-05-08 NOTE — GROUP NOTE
Group Therapy Note    Date: 5/8/2025    Group Start Time: 1100  Group End Time: 1130  Group Topic: Cognitive Skills    Amanda Valle CTRS    Cognitive Skills Group Note        Date: May 8, 2025 Start Time: 11am  End Time: 11:30am      Number of Participants in Group & Unit Census:  2/8    Topic: cognitive skills     Goal of Group: pt will demonstrate improved coping skills and improved interpersonal relationships      Comments:     Patient did not participate in Cognitive Skills group, despite staff encouragement and explanation of benefits.  Patient remain seclusive to self.  Q15 minute safety checks maintained for patient safety and will continue to encourage patient to attend unit programming.              Signature:  KMI BEATTY

## 2025-05-08 NOTE — BH NOTE
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 Patient was labile between combative and cooperative while at [facility]. Upon admission patient was lethargic and unable to answer any assessment questions. Patient has pablo catheter.    Estimated Length of Stay:  8-14 days  Estimated Discharge Date Update:   to be determined by physician    PATIENT STRENGTHS:  Patient Strengths:   Patient Strengths and Limitations:Limitations: Perceives need for assistance with self-care  Addictive Behavior:Addictive Behavior  In the Past 3 Months, Have You Felt or Has Someone Told You That You Have a Problem With  :  (JENARO)  Medical Problems:   Past Medical History:   Diagnosis Date    Autoimmune thyroiditis     Dementia (HCC)     Osteoporosis        Risk:  Fall Risk   Bar Scale Bar Scale Score: 18  BVC      Change in scores:  No. Changes to plan of Care:  No    Status EXAM:   Mental Status and Behavioral Exam  Normal: No  Level of Assistance: Independent/Self  Facial Expression: Brightened  Affect: Appropriate  Level of Consciousness: Confused  Frequency of Checks: 1 staff: 1 patient  - continuous  Mood:Normal: No  Mood: Depressed, Anxious  Motor Activity:Normal: No  Motor Activity: Unusual posture/gait  Eye Contact: Good  Observed Behavior: Cooperative, Friendly  Sexual Misconduct History: Current - no  Preception: [place] to person  Attention:Normal: No  Attention: Distractible  Thought Processes: Circumstantial  Thought Content:Normal: No  Thought Content: Preoccupations  Depression Symptoms: Isolative  Anxiety Symptoms: Generalized  Keila Symptoms: No problems reported or observed.  Hallucinations: None  Delusions:

## 2025-05-08 NOTE — GROUP NOTE
Group Therapy Note    Date: 5/8/2025    Group Start Time: 1330  Group End Time: 1415  Group Topic: recreation group     [facility]    Amanda Brunner CTRS        Group Therapy Note    Attendees 4/8       Patient's Goal:  pt will demonstrate improved coping skills and improved interpersonal relationships     Notes:   pt was pleasant and pariticpated well    Status After Intervention:  Unchanged    Participation Level: Active Listener and Interactive    Participation Quality: appropriate      Speech:  mumbles      Thought Process/Content: loose and disorganized       Affective Functioning: Flat      Mood: anxious      Level of consciousness:  Alert      Response to Learning: Able to verbalize current knowledge/experience and Progressing to goal      Endings: None Reported    Modes of Intervention: Support, Socialization, and Activity      Discipline Responsible: Psychoeducational Specialist      Signature:  KIM BEATTY

## 2025-05-08 NOTE — GROUP NOTE
Group Therapy Note    Date: 5/8/2025    Group Start Time: 1000  Group End Time: 1030  Group Topic: Psychotherapy     Isis Shepherd MSW        Group Therapy Note    Attendees: 2/8     Patient was offered group therapy today but declined to participate despite encouragement from staff.  1:1 was offered.    Discipline Responsible: /Counselor      Signature:  JENNIFER Browning

## 2025-05-08 NOTE — BH NOTE
CONFUSED> Pt hs been ambulating well with walker. Needs reminders to use walker. Oriented to person only. Pleasantly confused. Pt doesn't answer all questions appropriately. Declined to shower but allowed staff to help her clean up and shave her chin in bathroom. Remains 1:1 for safety.    denies pain/discomfort

## 2025-05-09 PROCEDURE — 6370000000 HC RX 637 (ALT 250 FOR IP): Performed by: PSYCHIATRY & NEUROLOGY

## 2025-05-09 PROCEDURE — 99232 SBSQ HOSP IP/OBS MODERATE 35: CPT

## 2025-05-09 PROCEDURE — APPSS30 APP SPLIT SHARED TIME 16-30 MINUTES

## 2025-05-09 PROCEDURE — 6370000000 HC RX 637 (ALT 250 FOR IP): Performed by: INTERNAL MEDICINE

## 2025-05-09 PROCEDURE — 99231 SBSQ HOSP IP/OBS SF/LOW 25: CPT | Performed by: PSYCHIATRY & NEUROLOGY

## 2025-05-09 PROCEDURE — 1240000000 HC EMOTIONAL WELLNESS R&B

## 2025-05-09 RX ADMIN — CYANOCOBALAMIN TAB 1000 MCG 1000 MCG: 1000 TAB at 11:27

## 2025-05-09 RX ADMIN — TRAZODONE HYDROCHLORIDE 50 MG: 50 TABLET ORAL at 20:51

## 2025-05-09 RX ADMIN — LEVOTHYROXINE SODIUM 137 MCG: 0.14 TABLET ORAL at 06:36

## 2025-05-09 RX ADMIN — DONEPEZIL HYDROCHLORIDE 10 MG: 10 TABLET, FILM COATED ORAL at 20:51

## 2025-05-09 RX ADMIN — Medication 1000 UNITS: at 11:28

## 2025-05-09 NOTE — GROUP NOTE
Group Therapy Note    Date: 5/9/2025    Group Start Time: 1000  Group End Time: 1030  Group Topic: Psychotherapy     Isis Shepherd MSW        Group Therapy Note    Attendees: 2/8     Patient was offered group therapy today but declined to participate despite encouragement from staff.  1:1 was offered.    Discipline Responsible: /Counselor      Signature:  JENNIFER Browning

## 2025-05-09 NOTE — GROUP NOTE
Group Therapy Note    Date: 5/9/2025    Group Start Time: 1330  Group End Time: 1415  Group Topic: Cognitive Skills    Amanda Valle CTRS    Cognitive Skills Group Note        Date: May 9, 2024 Start Time: 1:30pm  End Time:  215pm      Number of Participants in Group & Unit Census:  2/6    Topic: cognitive skills     Goal of Group: pt will demonstrate improved coping skills and improved interpersonal relationships      Comments:     Patient did not participate in Cognitive Skills group, despite staff encouragement and explanation of benefits.  Patient remain seclusive to self.  Q15 minute safety checks maintained for patient safety and will continue to encourage patient to attend unit programming.            Signature:  KIM BEATTY

## 2025-05-09 NOTE — CARE COORDINATION
Social Work spoke to patient's POA/Granddaughter Nabila. She requested any updates. Social Work stated we had not heard back regarding the insurance authorization. Nabila stated that she is working on finding a time to tour [facility].

## 2025-05-10 PROCEDURE — 6370000000 HC RX 637 (ALT 250 FOR IP): Performed by: INTERNAL MEDICINE

## 2025-05-10 PROCEDURE — 6370000000 HC RX 637 (ALT 250 FOR IP): Performed by: PSYCHIATRY & NEUROLOGY

## 2025-05-10 PROCEDURE — 99231 SBSQ HOSP IP/OBS SF/LOW 25: CPT | Performed by: PSYCHIATRY & NEUROLOGY

## 2025-05-10 PROCEDURE — 1240000000 HC EMOTIONAL WELLNESS R&B

## 2025-05-10 RX ADMIN — Medication 1000 UNITS: at 08:02

## 2025-05-10 RX ADMIN — DONEPEZIL HYDROCHLORIDE 10 MG: 10 TABLET, FILM COATED ORAL at 21:25

## 2025-05-10 RX ADMIN — CYANOCOBALAMIN TAB 1000 MCG 1000 MCG: 1000 TAB at 08:02

## 2025-05-10 RX ADMIN — LEVOTHYROXINE SODIUM 137 MCG: 0.14 TABLET ORAL at 06:29

## 2025-05-10 ASSESSMENT — PAIN SCALES - GENERAL: PAINLEVEL_OUTOF10: 0

## 2025-05-10 NOTE — BH NOTE
Group Therapy Note     Date: 5/10/25     Group Start Time: 8:00  Group End Time: 8:10  Group Topic: Orientation Group     [facility]    /LPN           Group Therapy Note     Attendees: 4/7     Patient attended morning orientation group and actively listened while educated on unit policies, expectations, and orientation. Patient was reminded of group times, location of white board, today's staff, shower location/time, group time limitations at nurses station, phone and tv times. Patient educated on discharge process and planning as well as informed that all patient's are seen by a MD or NP every day.            Signature: Tracey Crawford

## 2025-05-10 NOTE — GROUP NOTE
Group Note        Date: 5/10/25 Start Time: 9am  End Time: 10am      Number of Participants in Group & Unit Census:  0/6    Topic: Goals Group    Goal of Group:To empower individuals to identify, set, and work towards meaningful goals that align their values and desired outcomes.    Patient did not participate in Goals group, despite staff encouragement and explanation of benefits.  Patient remain seclusive to self.  Q15 minute safety checks maintained for patient safety and will continue to encourage patient to attend unit programming.         Signature:  Chrystal Juárez RN

## 2025-05-10 NOTE — BH NOTE
patient refused to attend morning orientation group at 8a after encouragement from staff.  1:1 talk time provided as alternative to group session

## 2025-05-10 NOTE — GROUP NOTE
Group Therapy Note    Date: 5/10/2025    Group Start Time: 1030  Group End Time: 1115  Group Topic: Cognitive Skills    Amanda Valle CTRS    Cognitive Skills Group Note        Date: May 5, 2025 Start Time:  1030am   End Time:  1115am      Number of Participants in Group & Unit Census:  1/6    Topic: cognitive skills     Goal of Group: pt will demonstrate improved coping skills and improved interpersonal relationships      Comments:     Patient did not participate in Cognitive Skills group, despite staff encouragement and explanation of benefits.  Patient remain seclusive to self.  Q15 minute safety checks maintained for patient safety and will continue to encourage patient to attend unit programming.              Signature:  KIM BEATTY

## 2025-05-11 PROCEDURE — 1240000000 HC EMOTIONAL WELLNESS R&B

## 2025-05-11 PROCEDURE — 99231 SBSQ HOSP IP/OBS SF/LOW 25: CPT | Performed by: PSYCHIATRY & NEUROLOGY

## 2025-05-11 PROCEDURE — 6370000000 HC RX 637 (ALT 250 FOR IP): Performed by: PSYCHIATRY & NEUROLOGY

## 2025-05-11 PROCEDURE — 6370000000 HC RX 637 (ALT 250 FOR IP): Performed by: INTERNAL MEDICINE

## 2025-05-11 RX ADMIN — Medication 1000 UNITS: at 08:00

## 2025-05-11 RX ADMIN — CYANOCOBALAMIN TAB 1000 MCG 1000 MCG: 1000 TAB at 08:00

## 2025-05-11 RX ADMIN — LEVOTHYROXINE SODIUM 137 MCG: 0.14 TABLET ORAL at 06:24

## 2025-05-11 RX ADMIN — DONEPEZIL HYDROCHLORIDE 10 MG: 10 TABLET, FILM COATED ORAL at 22:13

## 2025-05-11 RX ADMIN — TRAZODONE HYDROCHLORIDE 50 MG: 50 TABLET ORAL at 22:13

## 2025-05-11 ASSESSMENT — LIFESTYLE VARIABLES
HOW OFTEN DO YOU HAVE A DRINK CONTAINING ALCOHOL: PATIENT DECLINED
HOW MANY STANDARD DRINKS CONTAINING ALCOHOL DO YOU HAVE ON A TYPICAL DAY: PATIENT DECLINED

## 2025-05-11 NOTE — GROUP NOTE
Group Therapy Note    Date: 5/11/2025    Group Start Time: 0800  Group End Time: 0815  Group Topic: Community Meeting    [facility]    Yessenia Angel, RN        Group Therapy Note    Attendees: 1/5     Community Meeting Group Note     Goal of Group: demonstrate understanding of unit guidelines and schedule       Yessenia Angel, RN

## 2025-05-11 NOTE — GROUP NOTE
Group Therapy Note    Date: 5/11/2025    Group Start Time: 0900  Group End Time: 0930      [facility]    Yessenia Angel, RN        Group Therapy Note    Attendees: 5/5   Topic: Goal Setting     Goal of Group:Patient will identify daily goal and demonstrate understanding of unit guidelines and schedule      Yessenia Angel, RN

## 2025-05-11 NOTE — GROUP NOTE
Group Therapy Note    Date: 5/11/2025    Group Start Time: 1400  Group End Time: 1430  Group Topic: Group Documentation    [facility] Yessenia Barnhart, RN        Group Therapy Note    Attendees: 5/5  Patient participated with staff doing safety checks of room. No contraband found.

## 2025-05-12 PROCEDURE — 6370000000 HC RX 637 (ALT 250 FOR IP): Performed by: PSYCHIATRY & NEUROLOGY

## 2025-05-12 PROCEDURE — 1240000000 HC EMOTIONAL WELLNESS R&B

## 2025-05-12 PROCEDURE — 99232 SBSQ HOSP IP/OBS MODERATE 35: CPT

## 2025-05-12 PROCEDURE — 6370000000 HC RX 637 (ALT 250 FOR IP): Performed by: INTERNAL MEDICINE

## 2025-05-12 PROCEDURE — 97530 THERAPEUTIC ACTIVITIES: CPT

## 2025-05-12 PROCEDURE — 99231 SBSQ HOSP IP/OBS SF/LOW 25: CPT | Performed by: PSYCHIATRY & NEUROLOGY

## 2025-05-12 PROCEDURE — APPSS30 APP SPLIT SHARED TIME 16-30 MINUTES

## 2025-05-12 PROCEDURE — 99212 OFFICE O/P EST SF 10 MIN: CPT

## 2025-05-12 PROCEDURE — 97535 SELF CARE MNGMENT TRAINING: CPT

## 2025-05-12 RX ADMIN — Medication 1.5 MG: at 20:31

## 2025-05-12 RX ADMIN — DONEPEZIL HYDROCHLORIDE 10 MG: 10 TABLET, FILM COATED ORAL at 20:31

## 2025-05-12 RX ADMIN — CYANOCOBALAMIN TAB 1000 MCG 1000 MCG: 1000 TAB at 10:32

## 2025-05-12 RX ADMIN — TRAZODONE HYDROCHLORIDE 50 MG: 50 TABLET ORAL at 20:31

## 2025-05-12 RX ADMIN — LEVOTHYROXINE SODIUM 137 MCG: 0.14 TABLET ORAL at 06:19

## 2025-05-12 RX ADMIN — Medication 1000 UNITS: at 10:32

## 2025-05-12 ASSESSMENT — PAIN SCALES - GENERAL: PAINLEVEL_OUTOF10: 0

## 2025-05-12 NOTE — GROUP NOTE
Group Therapy Note    Date: 5/12/2025    Group Start Time: 1000  Group End Time: 1030  Group Topic: Psychotherapy     Isis Shepherd MSW        Group Therapy Note    Attendees: 3/7     Patient was offered group therapy today but declined to participate despite encouragement from staff.  1:1 was offered.    Discipline Responsible: /Counselor      Signature:  JENNIFER Browning

## 2025-05-12 NOTE — CARE COORDINATION
Writer received notification from Shy with [employer] that patient's prior auth has gone to peer to peer due today at 12PM. Phone number [phone] ext [phone]. Reference number [id number]. MD NOTIFIED

## 2025-05-12 NOTE — GROUP NOTE
Group Therapy Note    Date: 5/12/2025    Group Start Time: 0900  Group End Time: 0915  Group Topic: Group Documentation    Silvana Justice, RN        Group Therapy Note    Attendees: 3/7  Community Meeting Group Note        Date: May 12, 2025 Start Time: 9am  End Time:  9:15am      Number of Participants in Group & Unit Census:  3/7    Topic: Goals group    Goal of Group:To establish attainable daily goal(s)      Comments:     Patient did not participate in Community Meeting group, despite staff encouragement and explanation of benefits.  Patient remain seclusive to self.  Q15 minute safety checks maintained for patient safety and will continue to encourage patient to attend unit programming.

## 2025-05-13 LAB
ANION GAP SERPL CALCULATED.3IONS-SCNC: 7 MMOL/L (ref 9–16)
BASOPHILS # BLD: 0 K/UL (ref 0–0.2)
BASOPHILS NFR BLD: 0 % (ref 0–2)
BUN SERPL-MCNC: 32 MG/DL (ref 8–23)
CALCIUM SERPL-MCNC: 8.6 MG/DL (ref 8.6–10.4)
CHLORIDE SERPL-SCNC: 109 MMOL/L (ref 98–107)
CO2 SERPL-SCNC: 27 MMOL/L (ref 20–31)
CREAT SERPL-MCNC: 1 MG/DL (ref 0.7–1.2)
EOSINOPHIL # BLD: 0.1 K/UL (ref 0–0.4)
EOSINOPHILS RELATIVE PERCENT: 2 % (ref 0–4)
ERYTHROCYTE [DISTWIDTH] IN BLOOD BY AUTOMATED COUNT: 14.4 % (ref 11.5–14.9)
GFR, ESTIMATED: 58 ML/MIN/1.73M2
GLUCOSE SERPL-MCNC: 95 MG/DL (ref 74–99)
HCT VFR BLD AUTO: 33.3 % (ref 36–46)
HGB BLD-MCNC: 11.8 G/DL (ref 12–16)
LYMPHOCYTES NFR BLD: 1.2 K/UL (ref 1–4.8)
LYMPHOCYTES RELATIVE PERCENT: 28 % (ref 24–44)
MCH RBC QN AUTO: 36.9 PG (ref 26–34)
MCHC RBC AUTO-ENTMCNC: 35.4 G/DL (ref 31–37)
MCV RBC AUTO: 104.3 FL (ref 80–100)
MONOCYTES NFR BLD: 0.4 K/UL (ref 0.1–1.3)
MONOCYTES NFR BLD: 8 % (ref 1–7)
NEUTROPHILS NFR BLD: 62 % (ref 36–66)
NEUTS SEG NFR BLD: 2.7 K/UL (ref 1.3–9.1)
PLATELET # BLD AUTO: 165 K/UL (ref 150–450)
PMV BLD AUTO: 7.8 FL (ref 6–12)
POTASSIUM SERPL-SCNC: 4 MMOL/L (ref 3.7–5.3)
RBC # BLD AUTO: 3.19 M/UL (ref 4–5.2)
SODIUM SERPL-SCNC: 143 MMOL/L (ref 136–145)
T4 FREE SERPL-MCNC: 1 NG/DL (ref 0.9–1.7)
TSH SERPL DL<=0.05 MIU/L-ACNC: 82.6 UIU/ML (ref 0.27–4.2)
WBC OTHER # BLD: 4.4 K/UL (ref 3.5–11)

## 2025-05-13 PROCEDURE — 6370000000 HC RX 637 (ALT 250 FOR IP): Performed by: INTERNAL MEDICINE

## 2025-05-13 PROCEDURE — 80048 BASIC METABOLIC PNL TOTAL CA: CPT

## 2025-05-13 PROCEDURE — 99231 SBSQ HOSP IP/OBS SF/LOW 25: CPT | Performed by: PSYCHIATRY & NEUROLOGY

## 2025-05-13 PROCEDURE — 84443 ASSAY THYROID STIM HORMONE: CPT

## 2025-05-13 PROCEDURE — 97535 SELF CARE MNGMENT TRAINING: CPT

## 2025-05-13 PROCEDURE — 6370000000 HC RX 637 (ALT 250 FOR IP): Performed by: PSYCHIATRY & NEUROLOGY

## 2025-05-13 PROCEDURE — 36415 COLL VENOUS BLD VENIPUNCTURE: CPT

## 2025-05-13 PROCEDURE — 1240000000 HC EMOTIONAL WELLNESS R&B

## 2025-05-13 PROCEDURE — APPSS30 APP SPLIT SHARED TIME 16-30 MINUTES

## 2025-05-13 PROCEDURE — 85025 COMPLETE CBC W/AUTO DIFF WBC: CPT

## 2025-05-13 PROCEDURE — 84439 ASSAY OF FREE THYROXINE: CPT

## 2025-05-13 RX ADMIN — CYANOCOBALAMIN TAB 1000 MCG 1000 MCG: 1000 TAB at 11:01

## 2025-05-13 RX ADMIN — LEVOTHYROXINE SODIUM 137 MCG: 0.14 TABLET ORAL at 06:16

## 2025-05-13 RX ADMIN — TRAZODONE HYDROCHLORIDE 50 MG: 50 TABLET ORAL at 21:42

## 2025-05-13 RX ADMIN — Medication 1000 UNITS: at 11:01

## 2025-05-13 RX ADMIN — DONEPEZIL HYDROCHLORIDE 10 MG: 10 TABLET, FILM COATED ORAL at 21:41

## 2025-05-13 ASSESSMENT — PAIN SCALES - GENERAL: PAINLEVEL_OUTOF10: 0

## 2025-05-13 NOTE — GROUP NOTE
Group Therapy Note    Date: 5/13/2025    Group Start Time: 1000  Group End Time: 1030  Group Topic: Psychotherapy    [facility] Isis Shepherd MSW        Group Therapy Note    Attendees: 4/6       Patient's Goal:  Discuss various positive traits and experiences that used those traits    Notes:     Status After Intervention:  Unchanged    Participation Level: Active Listener and Interactive    Participation Quality: Attentive and Sharing      Speech:  normal      Thought Process/Content: Flight of ideas      Affective Functioning: Congruent      Mood: euthymic      Level of consciousness:  Alert and Attentive      Response to Learning: Able to verbalize current knowledge/experience      Endings: None Reported    Modes of Intervention: Socialization and Exploration      Discipline Responsible: /Counselor      Signature:  JENNIFER Browning

## 2025-05-13 NOTE — GROUP NOTE
Group Therapy Note    Date: 5/13/2025    Group Start Time: 0900  Group End Time: 0910  Group Topic: Nursing    Betty Garcia RN        Group Therapy Note    Attendees: 3/7       Patient's Goal:  To feel better    Status After Intervention:  Improved    Participation Level: Interactive    Participation Quality: Appropriate      Speech:  normal      Thought Process/Content: Linear      Affective Functioning: Congruent      Mood:  stable      Level of consciousness:  Attentive      Response to Learning: Progressing to goal      Endings: None Reported    Modes of Intervention: Support and Socialization      Discipline Responsible: Registered Nurse      Signature:  Betty Vyas RN

## 2025-05-13 NOTE — GROUP NOTE
Group Therapy Note    Date: 5/13/2025    Group Start Time: 1430  Group End Time: 1500  Group Topic: Nursing    Betty Garcia, RN    Nursing  Group Note        Date: May 13, 2025 Start Time:  1430   End Time:  1500      Number of Participants in Group & Unit Census:  2/6    Topic: To improved hand eye coordination by coloring and discussing  Comments:     Patient did not participate in  Nursing  group, despite staff encouragement and explanation of benefits.  Patient remain seclusive to self.  Q15 minute safety checks maintained for patient safety and will continue to encourage patient to attend unit programming.         21-Jun-2024 08:12

## 2025-05-13 NOTE — GROUP NOTE
Group Therapy Note    Date: 5/13/2025    Group Start Time: 1100  Group End Time: 1135  Group Topic: Cognitive Skills    [facility] Amanda Cruz CTRS        Group Therapy Note    Attendees: 3/6       Patient's Goal:  pt will demonstrate improved coping skills and improved interpersonal relationships     Notes:   pt was pleasant but had minimal particicpation    Status After Intervention:  Improved    Participation Level:active listener    Participation Quality: minimal      Speech: mumbles      Thought Process/Content:disorganized, loose       Affective Functioning: Congruent      Mood: euthymic      Level of consciousness:  Alert      Response to Learning: Able to verbalize current knowledge/experience and Progressing to goal      Endings: None Reported    Modes of Intervention: Education, Support, and Activity      Discipline Responsible: Psychoeducational Specialist      Signature:  KIM BEATTY

## 2025-05-14 PROCEDURE — 6370000000 HC RX 637 (ALT 250 FOR IP): Performed by: INTERNAL MEDICINE

## 2025-05-14 PROCEDURE — APPSS30 APP SPLIT SHARED TIME 16-30 MINUTES

## 2025-05-14 PROCEDURE — 6370000000 HC RX 637 (ALT 250 FOR IP): Performed by: PSYCHIATRY & NEUROLOGY

## 2025-05-14 PROCEDURE — 1240000000 HC EMOTIONAL WELLNESS R&B

## 2025-05-14 PROCEDURE — 97530 THERAPEUTIC ACTIVITIES: CPT

## 2025-05-14 PROCEDURE — 99232 SBSQ HOSP IP/OBS MODERATE 35: CPT | Performed by: PSYCHIATRY & NEUROLOGY

## 2025-05-14 RX ADMIN — CYANOCOBALAMIN TAB 1000 MCG 1000 MCG: 1000 TAB at 08:29

## 2025-05-14 RX ADMIN — TRAZODONE HYDROCHLORIDE 50 MG: 50 TABLET ORAL at 20:11

## 2025-05-14 RX ADMIN — DONEPEZIL HYDROCHLORIDE 10 MG: 10 TABLET, FILM COATED ORAL at 20:11

## 2025-05-14 RX ADMIN — LEVOTHYROXINE SODIUM 137 MCG: 0.14 TABLET ORAL at 06:23

## 2025-05-14 RX ADMIN — Medication 1000 UNITS: at 08:29

## 2025-05-14 NOTE — BH NOTE
1:1 Note  Patient dangling on edge of her bed, talking to 1:1 staff.  Breaths are even and unlabored. No apparent distress noted. 1:1 maintained as ordered for patient safety.

## 2025-05-14 NOTE — GROUP NOTE
Group Therapy Note    Date: 5/14/2025    Group Start Time: 1430  Group End Time: 1500  Group Topic: Recreational    [facility] Amanda Cagle CTRS    Recreation Group Note        Date: May 14, 2025 Start Time: 2:30pm  End Time: 3pm      Number of Participants in Group & Unit Census:  1/7    Topic: recreation group    Goal of Group: pt will demonstrate improved coping skills and improved interpersonal relationships      Comments:     Patient did not participate in Recreation group, despite staff encouragement and explanation of benefits.  Patient remain seclusive to self.  Q15 minute safety checks maintained for patient safety and will continue to encourage patient to attend unit programming.              Signature:  KIM BEATTY

## 2025-05-14 NOTE — GROUP NOTE
Group Therapy Note    Date: 5/14/2025    Group Start Time: 0900  Group End Time: 0915  Group Topic: Discharge Planning    Yessenia Giordano, RN        Group Therapy Note    Attendees: 5/7   Patient refused to attend discharge planning group at 0900 after encouragement from staff.  1:1 talk time provided as alternative to group session

## 2025-05-14 NOTE — GROUP NOTE
Group Therapy Note    Date: 5/14/2025    Group Start Time: 1000  Group End Time: 1030  Group Topic: Psychotherapy    [facility] Isis Shepherd MSW        Group Therapy Note    Attendees: 4/7       Patient's Goal:  Practice socialization skills using prompts    Notes:     Status After Intervention:  Improved    Participation Level: Interactive    Participation Quality: Attentive and Sharing      Speech:  normal      Thought Process/Content: Flight of ideas      Affective Functioning: Congruent      Mood: euthymic      Level of consciousness:  Alert      Response to Learning: Able to verbalize current knowledge/experience      Endings: None Reported    Modes of Intervention: Socialization      Discipline Responsible: /Counselor      Signature:  JENNIFER Browning

## 2025-05-14 NOTE — GROUP NOTE
Group Therapy Note    Date: 5/14/2025    Group Start Time: 0800  Group End Time: 0830  Group Topic: Community Meeting    [facility]    Yessenia Angel, RN        Group Therapy Note    Attendees: 6/7   Community Meeting Group Note     Patient did not participate in Community Meeting group, despite staff encouragement and explanation of benefits.  Patient remain seclusive to self.  Q15 minute safety checks maintained for patient safety and will continue to encourage patient to attend unit programming.       Yessenia Angel, RN

## 2025-05-14 NOTE — GROUP NOTE
Group Therapy Note    Date: 5/14/2025    Group Start Time: 1100  Group End Time: 1130  Group Topic: Cognitive Skills    Amanda Valle CTRS    Cognitive Skills Group Note        Date: May 14, 2025 Start Time: 11am  End Time: 11:30am      Number of Participants in Group & Unit Census:  2/7    Topic: cognitive skills     Goal of Group: pt will demonstrate improved coping skills and improved interpersonal relationships       Comments:     Patient did not participate in Cognitive Skills group, despite staff encouragement and explanation of benefits.  Patient remain seclusive to self.  Q15 minute safety checks maintained for patient safety and will continue to encourage patient to attend unit programming.            Signature:  KIM BEATTY

## 2025-05-14 NOTE — CARE COORDINATION
Writer spoke to patient's granddaughter and JESSY Dahl providing update on denial from insurance. Nabila states the medicaid application has not been completed yet due to patient's car not being sold yet. Patient does not have a medicaid pending number at this time. Nabila states she plans to discuss disposition options with sisters. Nabila states it may be possible for patient to go stay with a family member. Nabila reiterates that it is not safe for patient to independently return home at the time of discharge.

## 2025-05-15 PROCEDURE — 1240000000 HC EMOTIONAL WELLNESS R&B

## 2025-05-15 PROCEDURE — 6370000000 HC RX 637 (ALT 250 FOR IP): Performed by: INTERNAL MEDICINE

## 2025-05-15 PROCEDURE — 6370000000 HC RX 637 (ALT 250 FOR IP): Performed by: PSYCHIATRY & NEUROLOGY

## 2025-05-15 PROCEDURE — APPSS30 APP SPLIT SHARED TIME 16-30 MINUTES

## 2025-05-15 RX ADMIN — TRAZODONE HYDROCHLORIDE 50 MG: 50 TABLET ORAL at 19:45

## 2025-05-15 RX ADMIN — LEVOTHYROXINE SODIUM 137 MCG: 0.14 TABLET ORAL at 08:46

## 2025-05-15 RX ADMIN — DONEPEZIL HYDROCHLORIDE 10 MG: 10 TABLET, FILM COATED ORAL at 19:45

## 2025-05-15 RX ADMIN — ACETAMINOPHEN 650 MG: 325 TABLET ORAL at 12:48

## 2025-05-15 RX ADMIN — Medication 1.5 MG: at 19:45

## 2025-05-15 RX ADMIN — Medication 1000 UNITS: at 08:46

## 2025-05-15 RX ADMIN — CYANOCOBALAMIN TAB 1000 MCG 1000 MCG: 1000 TAB at 08:46

## 2025-05-15 ASSESSMENT — PAIN DESCRIPTION - ORIENTATION: ORIENTATION: LOWER

## 2025-05-15 ASSESSMENT — PAIN DESCRIPTION - DESCRIPTORS: DESCRIPTORS: ACHING

## 2025-05-15 ASSESSMENT — PAIN SCALES - GENERAL: PAINLEVEL_OUTOF10: 0

## 2025-05-15 ASSESSMENT — PAIN DESCRIPTION - LOCATION: LOCATION: BACK

## 2025-05-15 NOTE — BH NOTE
Patient has a L skin tear. Dressing dated 5/12: per orders dressing to be changed every 3 days. Skin Tear cleansed with soap and water, pat dry. Oil Emulsion applied which was then covered by foam dressing. Patient tolerated well, denied any pain. Singing to songs.

## 2025-05-15 NOTE — GROUP NOTE
Group Therapy Note    Date: 5/15/2025    Group Start Time: 1400  Group End Time: 1410  Group Topic: Nursing    Elizabeth Dubois RN        Group Therapy Note    Attendees: 0/8     Patient did not attend group despite encouragement from staff, 1:1 talk time provided.     Signature:  Elizabeth Christy RN

## 2025-05-15 NOTE — CARE COORDINATION
Writer spoke to Lelia from [address]. Lelia states she needs to complete an onsite prior to acceptance. She states she will complete onsite this afternoon. Nursing staff notified.

## 2025-05-15 NOTE — CARE COORDINATION
Writer spoke to JESSY Dahl. She states patient will be going family member Johanny's house [address] [place] [address]. Nabila provided consent for social work to send referrals to home health agencies.     Writer sent referrals for home health to the following agencies:   [facility]  [address]

## 2025-05-15 NOTE — GROUP NOTE
Group Therapy Note    Date: 5/15/2025    Group Start Time: 1000  Group End Time: 1030  Group Topic: Psychotherapy    [facility] Isis Shepherd MSW        Group Therapy Note    Attendees: 5/8       Patient's Goal:  Practice a coping skill: Chair yoga    Notes:  Left group early to use restroom    Status After Intervention:  Improved    Participation Level: Active Listener and Interactive    Participation Quality: Attentive      Speech:  normal      Thought Process/Content: Delusional      Affective Functioning: Congruent      Mood: euthymic      Level of consciousness:  Attentive      Response to Learning: Able to verbalize current knowledge/experience      Endings: None Reported    Modes of Intervention: Socialization and Activity      Discipline Responsible: /Counselor      Signature:  JENNIFER Browning

## 2025-05-15 NOTE — CARE COORDINATION
Writer received voicemail from Juliette with [facility]. Patient denied.    Writer received voicemail from Amanda from [facility]. Patient accepted for home health services.    Onsite completed with Lelia with [address]. Patient accepted for home health services.

## 2025-05-15 NOTE — GROUP NOTE
Group Therapy Note    Date: 5/15/2025    Group Start Time: 0950  Group End Time: 1000  Group Topic: Nursing    [facility] Betty Alva RN        Group Therapy Note    Attendees: 4/8       Patient's Goal:  Per patient \" Take a nap and eat\"    Status After Intervention:  Unchanged    Participation Level: Interactive    Participation Quality: Attentive      Speech:  normal      Thought Process/Content: Perseverating      Affective Functioning: Congruent      Mood:  stable: confused      Level of consciousness:  Preoccupied      Response to Learning: Capable of insight      Endings: None Reported    Modes of Intervention: Socialization      Discipline Responsible: Registered Nurse      Signature:  Betty Vyas RN

## 2025-05-15 NOTE — GROUP NOTE
Group Therapy Note    Date: 5/15/2025    Group Start Time: 1100  Group End Time: 1135  Group Topic: Cognitive Skills    [facility] Amanda Cruz CTRS      Group Therapy Note    Attendees: 3/7       Patient's Goal:  pt will demonstrate improved coping skills and improved interpersonal relationships    Notes:  pt was pleasant but thoughts are loose and disorganized    Status After Intervention:  Improved    Participation Level: Active Listener and Interactive    Participation Quality: needs propts    Speech:  mumbles      Thought Process/Content: confused, disorganized       Affective Functioning: Congruent      Mood: anxious      Level of consciousness:  Alert      Response to Learning: Able to verbalize current knowledge/experience, Capable of insight, and Progressing to goal      Endings: None Reported    Modes of Intervention: Education, Support, and Activity      Discipline Responsible: Psychoeducational Specialist      Signature:  KIM BEATTY

## 2025-05-16 PROCEDURE — 99232 SBSQ HOSP IP/OBS MODERATE 35: CPT | Performed by: INTERNAL MEDICINE

## 2025-05-16 PROCEDURE — 6370000000 HC RX 637 (ALT 250 FOR IP): Performed by: INTERNAL MEDICINE

## 2025-05-16 PROCEDURE — 97530 THERAPEUTIC ACTIVITIES: CPT

## 2025-05-16 PROCEDURE — 6370000000 HC RX 637 (ALT 250 FOR IP): Performed by: PSYCHIATRY & NEUROLOGY

## 2025-05-16 PROCEDURE — 1240000000 HC EMOTIONAL WELLNESS R&B

## 2025-05-16 RX ORDER — CHOLECALCIFEROL (VITAMIN D3) 25 MCG
1000 TABLET ORAL DAILY
Qty: 90 TABLET | Refills: 1 | Status: SHIPPED | OUTPATIENT
Start: 2025-05-16

## 2025-05-16 RX ORDER — DONEPEZIL HYDROCHLORIDE 10 MG/1
10 TABLET, FILM COATED ORAL NIGHTLY
Qty: 30 TABLET | Refills: 3 | Status: SHIPPED | OUTPATIENT
Start: 2025-05-16

## 2025-05-16 RX ORDER — LANOLIN ALCOHOL/MO/W.PET/CERES
1000 CREAM (GRAM) TOPICAL DAILY
Qty: 30 TABLET | Refills: 3 | Status: SHIPPED | OUTPATIENT
Start: 2025-05-16

## 2025-05-16 RX ORDER — TRAZODONE HYDROCHLORIDE 50 MG/1
50 TABLET ORAL NIGHTLY PRN
Qty: 30 TABLET | Refills: 0 | Status: SHIPPED | OUTPATIENT
Start: 2025-05-16

## 2025-05-16 RX ORDER — LEVOTHYROXINE SODIUM 137 UG/1
137 TABLET ORAL DAILY
Qty: 30 TABLET | Refills: 3 | Status: SHIPPED | OUTPATIENT
Start: 2025-05-17

## 2025-05-16 RX ADMIN — Medication 1000 UNITS: at 08:18

## 2025-05-16 RX ADMIN — Medication 1.5 MG: at 20:32

## 2025-05-16 RX ADMIN — TRAZODONE HYDROCHLORIDE 50 MG: 50 TABLET ORAL at 20:32

## 2025-05-16 RX ADMIN — CYANOCOBALAMIN TAB 1000 MCG 1000 MCG: 1000 TAB at 08:22

## 2025-05-16 RX ADMIN — LEVOTHYROXINE SODIUM 137 MCG: 0.14 TABLET ORAL at 08:18

## 2025-05-16 RX ADMIN — DONEPEZIL HYDROCHLORIDE 10 MG: 10 TABLET, FILM COATED ORAL at 20:32

## 2025-05-16 ASSESSMENT — LIFESTYLE VARIABLES
HOW MANY STANDARD DRINKS CONTAINING ALCOHOL DO YOU HAVE ON A TYPICAL DAY: PATIENT DOES NOT DRINK
HOW OFTEN DO YOU HAVE A DRINK CONTAINING ALCOHOL: NEVER
HOW MANY STANDARD DRINKS CONTAINING ALCOHOL DO YOU HAVE ON A TYPICAL DAY: PATIENT DECLINED
HOW OFTEN DO YOU HAVE A DRINK CONTAINING ALCOHOL: PATIENT DECLINED

## 2025-05-16 NOTE — BH NOTE
[facility]  Discharge Note    Pt discharged with followings belongings:   Dental Appliances: None  Vision - Corrective Lenses: None  Hearing Aid: None  Jewelry: None  Body Piercings Removed: N/A  Clothing: Shirt, Pants  Other Valuables: Other (Comment) (Wipes, hand therapy foam blocks)   Valuables sent home with patient or returned to patient. Patient educated on aftercare instructions: YES  Information faxed to  Neema Hsieh MD by staff  at 9:36 AM .Patient verbalize understanding of AVS:  YES.    Status EXAM upon discharge:  Mental Status and Behavioral Exam  Normal: No  Level of Assistance: Partial assist  Facial Expression: Brightened  Affect: Appropriate  Level of Consciousness: Confused  Frequency of Checks: 1 staff: 1 patient  - continuous  Mood:Normal: Yes  Mood: Anxious  Motor Activity:Normal: No  Motor Activity: Unusual posture/gait  Eye Contact: Good  Observed Behavior: Cooperative  Sexual Misconduct History: Current - no  Preception: [place] to person, [place] to time, [place] to place  Attention:Normal: No  Attention: Unable to concentrate  Thought Processes: Loose association  Thought Content:Normal: No  Thought Content: Preoccupations  Depression Symptoms: No problems reported or observed.  Anxiety Symptoms: Generalized  Keila Symptoms: No problems reported or observed.  Hallucinations: None  Delusions: No  Delusions: Other (comment) (no delusions at this time)  Memory:Normal: No  Memory: Poor recent, Poor remote  Insight and Judgment: No  Insight and Judgment: Poor judgment, Poor insight    Tobacco Screening:  Practical Counseling, on admission, jose guadalupe X, if applicable and completed (first 3 are required if patient doesn't refuse):            ( x) Recognizing danger situations (included triggers and roadblocks)                    (x ) Coping skills (new ways to manage stress,relaxation techniques, changing routine, distraction)

## 2025-05-16 NOTE — GROUP NOTE
Group Therapy Note    Date: 5/16/2025    Group Start Time: 1015  Group End Time: 1020  Group Topic: Group Documentation    [facility]    Kenzie Soto LPN        Group Therapy Note         Patient did not participate in Goals  group, despite staff encouragement and explanation of benefits.  Patient remain seclusive to self.  Q15 minute safety checks maintained for patient safety and will continue to encourage patient to attend unit programming.      Signature:  Kenzie Soto LPN

## 2025-05-16 NOTE — CARE COORDINATION
Discharge Arrangements:  Patient to discharge to [address] [place] [address]. PCP appointment provided with Dr. Hsieh. Home health services to be provided by [address]    Guardian notified: POA notified    Discharge destination/address: [address] [place] [address]     Transported by:  family    Patient was not accepting of referral.  Follow up appointment is scheduled for 5/22 at 11:40AM Dr. Hsieh.    *TA resources were offered to patient throughout admission and at time of discharge. This list of [facility] TA providers was provided to patient:     [facility]  [address] [facility] [address]   [address] [facility] [address]  Phone: [phone]     Phone: [phone]    [facility]   [address] [facility] [address]   [address] [facility] [address]  Phone: [phone]     Phone: [phone]    [facility]  [address] [facility] [address]    [address] [facility] [facility] [address]  Phone: [phone]     Phone: [phone]    [facility]     [facility]   [address] [facility] [address] [place] [address]  [place] [address]      Phone: [phone]  Phone: [phone]        [facility]  [address] [facility] [address]   [address] [facility] [place] [address]  Phone: [phone]     Phone:  [phone]    [facility] Concept[facility]      [facility], [facility]  [address] [facility] [address]   [address] [facility] [address]  Phone: [phone]     Phone: [phone]    [facility]  [address] [facility] [address]   [address] [facility] [address]  Phone: [phone]     Phone: [phone]    [facility]  [facility]  [address] [facility] [address] [address] [facility] [address]  Phone: [phone]     Phone:

## 2025-05-16 NOTE — DISCHARGE INSTRUCTIONS
[address]  [address] [facility] [address]  Phone: [phone]      Phone: [phone]    [facility]      [facility]  [address] [place] [address]    [address] [facility] [address]  Phone: [phone]      Phone: [phone]    [facility]     [facility] Department of Psychiatry  [address] [facility] [address]   [address] [place] [address]  [address]  Phone: [phone]      Phone: [phone]    [facility]       [facility]  [address] [facility] [address]     [address] [facility] [address]  Phone: [phone]      Phone: [phone]    [facility]   [address] [facility] [address]    [address] [facility] [address] [facility] [address]  Phone: [phone]      Phone: [phone] Ext: [phone]    [facility]  [address] [facility] [address] / [address] [facility] [address] / [address] [facility] [address]  Phone: [phone]    [facility]   [facility]  [address] [facility] [address] [facility] [address]  [address] [place] [address]  Phone: [phone]      Phone: [phone]    [facility]  [address] [facility] [address]    [address] [facility] [address]  Phone: [phone]      Phone: [phone]

## 2025-05-16 NOTE — GROUP NOTE
Group Therapy Note    Date: 5/16/2025    Group Start Time: 1100  Group End Time: 1130  Group Topic: Cognitive Skills    Amanda Valle CTRS    Cognitive Skills Group Note        Date: May 16, 2025 Start Time: 11am  End Time: 11:30am      Number of Participants in Group & Unit Census:  2/9    Topic: cognitive skills     Goal of Group: pt will demonstrate improved coping skills and improved interpersonal relationships      Comments:     Patient did not participate in Cognitive Skills group, despite staff encouragement and explanation of benefits.  Patient remain seclusive to self.  Q15 minute safety checks maintained for patient safety and will continue to encourage patient to attend unit programming.              Signature:  KIM BEATTY

## 2025-05-16 NOTE — TRANSITION OF CARE
Behavioral Health Transition Record    Patient Name: Leticia Tran  YOB: 1945   Medical Record Number: [id number]  Date of Admission: 4/30/2025  5:57 PM   Date of Discharge: 5-16-25    Attending Provider: Miguel Angel Price MD   Discharging Provider:  Dr LAURA Price  To contact this individual call  and ask the  to page.  If unavailable, ask to be transferred to Behavioral Health Provider on call.  A Behavioral Health Provider will be available on call 24/7 and during holidays.    Primary Care Provider: Neema Hsieh MD    Allergies   Allergen Reactions    Latex      Band Aid    Tetanus Toxoid      seizures       Reason for Admission: Threat to self requiring 24 hour professional observation  Threat to others requiring 24 hour professional observation  Acute disordered/bizarre behavior or psychomotor agitation or retardation;interferes with ADLs so that patient cannot function at a less intensive care level of care during evaluation and treatment   Patient has a dementing disorder and is admitted for eval or treatment of a psychiatric comorbidity (i.e. risk of suicide, violence, severe depression) warranting inpatient admission   A mental disorder causing major disability in social, interpersonal, occupational, and/or educational functioning that is leading to dangerous or life-threatening functioning, and that can only be addressed in an acute inpatient setting   A mental disorder that causes an inability to maintain adequate nurtrition or self-care, and family/community support cannot provide reliable, essential care, so that the patient cannont function at a less intensive level of care during evaluation and treatment   Concerns about patient's safety in the community  Past Mental Health Diagnosis: a history of  cognitive impairment   Triggering event or precipitating factor: Psych Treatment Noncompliance  Length of time/duration of triggering event: Months  Legal Status:

## 2025-05-17 LAB
FERRITIN SERPL-MCNC: 167 NG/ML
IRON SATN MFR SERPL: 19 % (ref 20–55)
IRON SERPL-MCNC: 55 UG/DL (ref 37–145)
TIBC SERPL-MCNC: 287 UG/DL (ref 250–450)
UNSATURATED IRON BINDING CAPACITY: 232 UG/DL (ref 112–347)

## 2025-05-17 PROCEDURE — 99232 SBSQ HOSP IP/OBS MODERATE 35: CPT | Performed by: INTERNAL MEDICINE

## 2025-05-17 PROCEDURE — 6370000000 HC RX 637 (ALT 250 FOR IP): Performed by: PSYCHIATRY & NEUROLOGY

## 2025-05-17 PROCEDURE — 6370000000 HC RX 637 (ALT 250 FOR IP): Performed by: INTERNAL MEDICINE

## 2025-05-17 PROCEDURE — 82728 ASSAY OF FERRITIN: CPT

## 2025-05-17 PROCEDURE — 83540 ASSAY OF IRON: CPT

## 2025-05-17 PROCEDURE — 83550 IRON BINDING TEST: CPT

## 2025-05-17 PROCEDURE — 99232 SBSQ HOSP IP/OBS MODERATE 35: CPT

## 2025-05-17 PROCEDURE — 1240000000 HC EMOTIONAL WELLNESS R&B

## 2025-05-17 PROCEDURE — 36415 COLL VENOUS BLD VENIPUNCTURE: CPT

## 2025-05-17 RX ADMIN — CYANOCOBALAMIN TAB 1000 MCG 1000 MCG: 1000 TAB at 09:05

## 2025-05-17 RX ADMIN — Medication 1.5 MG: at 20:33

## 2025-05-17 RX ADMIN — Medication 1000 UNITS: at 09:05

## 2025-05-17 RX ADMIN — DONEPEZIL HYDROCHLORIDE 10 MG: 10 TABLET, FILM COATED ORAL at 20:32

## 2025-05-17 RX ADMIN — LEVOTHYROXINE SODIUM 137 MCG: 0.14 TABLET ORAL at 08:00

## 2025-05-17 RX ADMIN — TRAZODONE HYDROCHLORIDE 50 MG: 50 TABLET ORAL at 20:33

## 2025-05-17 ASSESSMENT — LIFESTYLE VARIABLES
HOW OFTEN DO YOU HAVE A DRINK CONTAINING ALCOHOL: NEVER
HOW MANY STANDARD DRINKS CONTAINING ALCOHOL DO YOU HAVE ON A TYPICAL DAY: PATIENT DOES NOT DRINK

## 2025-05-17 NOTE — GROUP NOTE
Group Therapy Note    Date: 5/17/2025    Group Start Time: 0905  Group End Time: 0911  Group Topic: Group Documentation    [facility]    Kenzie Soto LPN        Group Therapy Note      Patient did not participate in Goals  group, despite staff encouragement and explanation of benefits.  Patient remain seclusive to self.  Q15 minute safety checks maintained for patient safety and will continue to encourage patient to attend unit programming.      Signature:  Kenzie Soto LPN

## 2025-05-17 NOTE — GROUP NOTE
Group Therapy Note    Date: 5/17/2025    Group Start Time: 1030  Group End Time: 1115  Group Topic: Cognitive Skills    Lelia Schilling CTRS        Group Therapy Note    Attendees: 3/8    Cognitive Skills Group Note        Date: May 17, 2025          Start Time: 10:30am  End Time: 11:15am      Number of Participants in Group & Unit Census:  3/8    Topic:  interpersonal skills, memory recall, self-expression     Goal of Group: To improve interpersonal skills and memory recall through collaborating with peers and demonstrating self-expression.      Comments:     Patient did not participate in Cognitive Skills group, despite staff encouragement and explanation of benefits.  Patient remain seclusive to self.  Q15 minute safety checks maintained for patient safety and will continue to encourage patient to attend unit programming.        Signature:  KIM Walton

## 2025-05-17 NOTE — GROUP NOTE
Group Note        Date: 5/16/25 Start Time: 10am  End Time: 10:30am      Number of Participants in Group & Unit Census:  3/9    Topic: Stress management and relaxation    Goal of Group: To learn and practice coping skills.      Patient did not participate in Stress Management and Relaxation group, despite staff encouragement and explanation of benefits.  Patient remain seclusive to self.  Q15 minute safety checks maintained for patient safety and will continue to encourage patient to attend unit programming.       Signature:  Chrystal Juárez RN

## 2025-05-18 PROCEDURE — 99232 SBSQ HOSP IP/OBS MODERATE 35: CPT | Performed by: PSYCHIATRY & NEUROLOGY

## 2025-05-18 PROCEDURE — 1240000000 HC EMOTIONAL WELLNESS R&B

## 2025-05-18 PROCEDURE — 99232 SBSQ HOSP IP/OBS MODERATE 35: CPT | Performed by: INTERNAL MEDICINE

## 2025-05-18 PROCEDURE — 6370000000 HC RX 637 (ALT 250 FOR IP): Performed by: INTERNAL MEDICINE

## 2025-05-18 PROCEDURE — 6370000000 HC RX 637 (ALT 250 FOR IP): Performed by: PSYCHIATRY & NEUROLOGY

## 2025-05-18 RX ADMIN — TRAZODONE HYDROCHLORIDE 50 MG: 50 TABLET ORAL at 20:44

## 2025-05-18 RX ADMIN — LEVOTHYROXINE SODIUM 137 MCG: 0.14 TABLET ORAL at 06:33

## 2025-05-18 RX ADMIN — Medication 1.5 MG: at 20:44

## 2025-05-18 RX ADMIN — CYANOCOBALAMIN TAB 1000 MCG 1000 MCG: 1000 TAB at 09:23

## 2025-05-18 RX ADMIN — Medication 1000 UNITS: at 09:23

## 2025-05-18 RX ADMIN — DONEPEZIL HYDROCHLORIDE 10 MG: 10 TABLET, FILM COATED ORAL at 20:44

## 2025-05-18 NOTE — GROUP NOTE
Group Therapy Note    Date: 5/18/2025    Group Start Time: 1030  Group End Time: 1050  Group Topic: Psychotherapy    Issi Orosco MSW        Group Therapy Note    Attendees: 0/8     Patient was offered group therapy today but declined to participate despite encouragement from staff.  1:1 was offered.    Discipline Responsible: /Counselor      Signature:  JENNIFER Browning

## 2025-05-18 NOTE — GROUP NOTE
Group Therapy Note    Date: 5/18/2025    Group Start Time: 1400  Group End Time: 1440  Group Topic: Relaxation    [facility] Lelia Win CTRS        Group Therapy Note    Attendees: 5/9     Patient's Goal:  To improve interpersonal skills and leisure awareness through collaborating with peers and concentrating on presented task.     Notes:  Patient attended and participated in group. Patient declined to participate in presented task. Patient was able to collaborate with peers  Patient was pleasant.    Status After Intervention:  Improved    Participation Level: Active Listener and Interactive with peers    Participation Quality: Appropriate, Attentive, and Sharing      Speech:  normal      Thought Process/Content: Logical to task with prompting. Confabulations.      Affective Functioning: Congruent       Mood: euthymic      Level of consciousness:  Alert and Attentive      Response to Learning: Able to verbalize current knowledge/experience, Able to retain information, and Progressing to goal      Endings: None Reported    Modes of Intervention: Socialization, Exploration, and Activity      Discipline Responsible: Psychoeducational Specialist      Signature:  KIM Walton

## 2025-05-18 NOTE — GROUP NOTE
Goals Group Note        Date: May 18, 2025 Start Time: 9am  End Time: 910am      Number of Participants in Group & Unit Census:  2/8    Topic: daily goals    Goal of Group: to establish and discuss a goal for today      Comments:     Patient did not participate in goals group, despite staff encouragement and explanation of benefits.  Patient remain seclusive to self.  Q15 minute safety checks maintained for patient safety and will continue to encourage patient to attend unit programming.

## 2025-05-18 NOTE — GROUP NOTE
Community Meeting Group Note        Date: May 18, 2025 Start Time: 0800  End Time: 0805      Number of Participants in Group & Unit Census:  5/8    Topic: morning orientation    Goal of Group: discussion of unit guidelines and daily schedule      Comments:     Patient did not participate in morning orientation group, despite staff encouragement and explanation of benefits.  Patient remain seclusive to self.  Q15 minute safety checks maintained for patient safety and will continue to encourage patient to attend unit programming.

## 2025-05-19 PROCEDURE — 6370000000 HC RX 637 (ALT 250 FOR IP): Performed by: INTERNAL MEDICINE

## 2025-05-19 PROCEDURE — 1240000000 HC EMOTIONAL WELLNESS R&B

## 2025-05-19 PROCEDURE — APPSS30 APP SPLIT SHARED TIME 16-30 MINUTES

## 2025-05-19 PROCEDURE — 6370000000 HC RX 637 (ALT 250 FOR IP): Performed by: PSYCHIATRY & NEUROLOGY

## 2025-05-19 PROCEDURE — 99232 SBSQ HOSP IP/OBS MODERATE 35: CPT | Performed by: INTERNAL MEDICINE

## 2025-05-19 RX ADMIN — LEVOTHYROXINE SODIUM 137 MCG: 0.14 TABLET ORAL at 09:06

## 2025-05-19 RX ADMIN — TRAZODONE HYDROCHLORIDE 50 MG: 50 TABLET ORAL at 20:32

## 2025-05-19 RX ADMIN — Medication 1000 UNITS: at 09:06

## 2025-05-19 RX ADMIN — DONEPEZIL HYDROCHLORIDE 10 MG: 10 TABLET, FILM COATED ORAL at 20:32

## 2025-05-19 RX ADMIN — CYANOCOBALAMIN TAB 1000 MCG 1000 MCG: 1000 TAB at 09:06

## 2025-05-19 NOTE — GROUP NOTE
Group Therapy Note    Date: 5/19/2025    Group Start Time: 0950  Group End Time: 1005  Group Topic: Nursing    [facility] Betty Alva RN        Group Therapy Note    Attendees: 5/10       Patient's Goal:  Per patient \"Be more social today\".    Notes:  Patient very confused, has periodic pauses with statements. Friendly.    Status After Intervention:  Unchanged    Participation Level: Minimal    Participation Quality: Appropriate      Speech:  normal      Thought Process/Content: Linear      Affective Functioning: Congruent      Mood:  confused      Level of consciousness:  Preoccupied      Response to Learning: Progressing to goal      Endings: None Reported    Modes of Intervention: Support, Socialization, and Problem-solving      Discipline Responsible: Registered Nurse      Signature:  Betty Vyas RN

## 2025-05-19 NOTE — CARE COORDINATION
Writer [facility]. Transport to arrive at 1PM on 5/20.

## 2025-05-19 NOTE — GROUP NOTE
Group Therapy Note    Date: 5/19/2025    Group Start Time: 1006  Group End Time: 1040  Group Topic: Nursing    Betty Garcia RN        Group Therapy Note    Attendees: 6/10       Patient's Goal:  To stretch the arm muscles (biceps and triceps), abdomen muscles (obliques), leg muscles (hamstrings, gastrocnemius and soleus), chest muscles (pectoralis minor and major), back muscles (rhomboid minor, trapezius) to prevent injuries.     Notes:  Patient needed a lot of prompting to do exercises because of her baseline confusion. She mostly praised others for doing the activity.    Status After Intervention:  Unchanged    Participation Level: Minimal    Participation Quality: Supportive      Speech:  hesitant      Thought Process/Content: Perseverating      Affective Functioning: Congruent      Mood:  confused      Level of consciousness:  Preoccupied      Response to Learning: Progressing to goal      Endings: None Reported    Modes of Intervention: Activity      Discipline Responsible: Registered Nurse      Signature:  Betty Vyas RN

## 2025-05-19 NOTE — CARE COORDINATION
Discharge Arrangements:  JESSY Dahl notified.    Guardian notified: yes    Discharge destination/address: [address], [place] [address]     Transported by:  [facility]    Patient was not accepting of referral.  Follow up appointment is scheduled for Martinez will arrive at 1PM on 5/20 to transport you to [facility]. Mental health services to be provided. All services to be provided at facility.     *TA resources were offered to patient throughout admission and at time of discharge. This list of [facility] TA providers was provided to patient:     [facility]  [address] [facility] [address]   [address] [facility] [address]  Phone: [phone]     Phone: [phone]    [facility]   [address] [facility] [address]   [address] [facility] [address]  Phone: [phone]     Phone: [phone]    [facility], [facility]  [address] [facility] [address]    [address] [facility] [facility] [address]  Phone: [phone]     Phone: [phone]    Health Connections     [facility]   [address] [facility] [address] [place] [address]  [place] [address]      Phone: [phone]  Phone: [phone]        [facility]  [address] [facility] [facility] [address]   [address] [facility] [place] [address]  Phone: [phone]     Phone:  [phone]    [facility]      [facility] [place], [facility]  [address] [facility] [address]   [address] [facility] [address]  Phone: [phone]     Phone: [phone]    [facility]  [address] [facility] [address]   [address] [facility] [address]  Phone: [phone]     Phone: [phone]    [facility]  [facility]  [address] [facility] [address] [facility] [address] [address] [facility] [address] [facility] [address]  Phone:

## 2025-05-19 NOTE — CARE COORDINATION
Writer spoke to JESSY Dahl. She states she has completed the medicaid application. She states there are no funds for private pay SNF at this time. She reiterates that apartment is not safe for patient to return to.    Writer spoke to Shy from [employer]. She states she will have their  reach out to Nabila to discuss pending medicaid application and will reconsider patient. Shy further states that the memory care unit at the [place] location was filled over the weekend but they would consider patient for the [place] location.    Writer provided update to Nabila to expect phone call from [facility].

## 2025-05-19 NOTE — CARE COORDINATION
Writer spoke to Shy from [facility]. Patient is accepted to [facility]. Meds to [facility] (phone #: [phone]). MD notified.

## 2025-05-19 NOTE — GROUP NOTE
Group Therapy Note    Date: 5/19/2025    Group Start Time: 1500  Group End Time: 1600  Group Topic: Music Therapy    [facility] Manas Gutierres LPN        Group Therapy Note    Attendees:       Patient's Goal:  Music Therapy    Notes:  Patient enjoyed therapy and interacted very well    Status After Intervention:  Improved    Participation Level: Active Listener    Participation Quality: Appropriate and Supportive      Speech:  normal      Thought Process/Content: Logical      Affective Functioning: Congruent      Mood: euthymic      Level of consciousness:  Alert      Response to Learning: Progressing to goal      Endings: None Reported    Modes of Intervention: Socialization      Discipline Responsible: Licensed Practical Nurse      Signature:  Manas Beebe LPN

## 2025-05-19 NOTE — GROUP NOTE
Group Therapy Note    Date: 5/19/2025    Group Start Time: 1100  Group End Time: 1130  Group Topic: Cognitive Skills    Amanda Valle CTRS    Cognitive Skills Group Note        Date: May 19, 2025 Start Time: 11am  End Time: 11:30am      Number of Participants in Group & Unit Census:  4/10    Topic: cognitive skills    Goal of Group: pt will demonstrate improved coping skills and improved interpersonal relationships      Comments:     Patient did not participate in Cognitive Skills group, despite staff encouragement and explanation of benefits.  Patient remain seclusive to self.  Q15 minute safety checks maintained for patient safety and will continue to encourage patient to attend unit programming.              Signature:  KIM BEATTY

## 2025-05-20 VITALS
WEIGHT: 158 LBS | OXYGEN SATURATION: 98 % | TEMPERATURE: 98.1 F | HEART RATE: 67 BPM | RESPIRATION RATE: 16 BRPM | SYSTOLIC BLOOD PRESSURE: 119 MMHG | DIASTOLIC BLOOD PRESSURE: 58 MMHG | HEIGHT: 65 IN | BODY MASS INDEX: 26.33 KG/M2

## 2025-05-20 PROCEDURE — 6370000000 HC RX 637 (ALT 250 FOR IP): Performed by: INTERNAL MEDICINE

## 2025-05-20 PROCEDURE — 99232 SBSQ HOSP IP/OBS MODERATE 35: CPT | Performed by: INTERNAL MEDICINE

## 2025-05-20 RX ADMIN — CYANOCOBALAMIN TAB 1000 MCG 1000 MCG: 1000 TAB at 09:52

## 2025-05-20 RX ADMIN — Medication 1000 UNITS: at 09:52

## 2025-05-20 RX ADMIN — LEVOTHYROXINE SODIUM 137 MCG: 0.14 TABLET ORAL at 07:09

## 2025-05-20 NOTE — GROUP NOTE
Group Therapy Note    Date: 5/20/2025    Group Start Time: 1000  Group End Time: 1040  Group Topic: Psychotherapy     Isis Shepherd MSW        Group Therapy Note    Attendees: 3/12     Patient was offered group therapy today but declined to participate despite encouragement from staff.  1:1 was offered.    Discipline Responsible: /Counselor      Signature:  JENNIFER Browning

## 2025-05-20 NOTE — DISCHARGE SUMMARY
feeling ready for discharge. Patient denies suicidal or homicidal ideations, denies hallucinations or delusions. Denies SE's from meds.  It was decided that maximum benefit from hospital care had been achieved and patient can be discharged.     Consults:   Internal medicine consult    Significant Diagnostic Studies: Routine labs and diagnostics    Treatments: Psychotropic medications, therapy with group, milieu, and 1:1 with nurses, social workers, PAVANE/CNP, and Attending physician.      Discharge Medications:  Discharge Medication List as of 5/20/2025 11:58 AM        START taking these medications    Details   traZODone (DESYREL) 50 MG tablet Take 1 tablet by mouth nightly as needed for Sleep, Disp-30 tablet, R-0Normal           CONTINUE these medications which have CHANGED    Details   vitamin B-12 (CYANOCOBALAMIN) 1000 MCG tablet Take 1 tablet by mouth daily, Disp-30 tablet, R-3Normal      donepezil (ARICEPT) 10 MG tablet Take 1 tablet by mouth nightly, Disp-30 tablet, R-3Normal      levothyroxine (SYNTHROID) 137 MCG tablet Take 1 tablet by mouth Daily, Disp-30 tablet, R-3Normal      vitamin D3 (CHOLECALCIFEROL) 25 MCG (1000 UT) TABS tablet Take 1 tablet by mouth daily, Disp-90 tablet, R-1Normal      melatonin 3 MG TABS tablet Take 0.5 tablets by mouth nightly as needed (sleep), Disp-15 tablet, R-3Normal           STOP taking these medications       QUEtiapine (SEROQUEL) 50 MG tablet Comments:   Reason for Stopping:                Core Measures statement:   Not applicable    Discharge Exam:  Level of consciousness:  Within normal limits  Appearance: Street clothes, seated, with good grooming  Behavior/Motor: No abnormalities noted  Attitude toward examiner:  Cooperative, attentive, good eye contact  Speech:  spontaneous, normal rate, normal volume and well articulated  Mood:  euthymic  Affect:  Full range  Thought processes:  linear, goal directed and coherent  Thought content:  denies homicidal

## 2025-05-20 NOTE — PROGRESS NOTES
Behavioral Services                                              Medicare Re-Certification    I certify that the inpatient psychiatric hospital services furnished since the previous certification/re-certification were, and continue to be, medically necessary for;    [x] (1) Treatment which could reasonably be expected to improve the patient's condition,    [x] (2) Or for diagnostic study.    Estimated length of stay/service 2-3 days    Plan for post-hospital care home    This patient continues to need, on a daily basis, active treatment furnished directly by or requiring the supervision of inpatient psychiatric personnel.    Electronically signed by Miguel Angel Price MD on 5/15/2025 at 7:25 PM   
      Behavioral Services                                              Medicare Re-Certification    I certify that the inpatient psychiatric hospital services furnished since the previous certification/re-certification were, and continue to be, medically necessary for;    [x] (1) Treatment which could reasonably be expected to improve the patient's condition,    [x] (2) Or for diagnostic study.    Estimated length of stay/service 7    Plan for post-hospital care [facility]    This patient continues to need, on a daily basis, active treatment furnished directly by or requiring the supervision of inpatient psychiatric personnel.    Electronically signed by Miguel Angel Price MD on 5/7/2025 at 6:41 PM   
      Behavioral Services  Medicare Certification Upon Admission    I certify that this patient's inpatient psychiatric hospital admission is medically necessary for:    [x] (1) Treatment which could reasonably be expected to improve this patient's condition,       [x] (2) Or for diagnostic study;     AND     [x](2) The inpatient psychiatric services are provided while the individual is under the care of a physician and are included in the individualized plan of care.    Estimated length of stay/service 7-10    Plan for post-hospital care NH    Electronically signed by Miguel Angel Price MD on 5/1/2025 at 10:57 AM      
   05/05/25 1358   Encounter Summary   Encounter Overview/Reason Behavioral Health   Service Provided For Patient   Last Encounter  05/05/25   Behavioral Health    Type  Spirituality Group   Assessment/Intervention/Outcome   Assessment Calm;Compromised coping;Impaired resilience;Impaired social interaction   Intervention Active listening;Discussed relationship with God;Explored/Affirmed feelings, thoughts, concerns;Explored Coping Skills/Resources;Prayer (assurance of)/Felda;Sustaining Presence/Ministry of presence   Outcome Engaged in conversation;Expressed feelings, needs, and concerns;Expressed Gratitude;Receptive       
  Daily Progress Note  5/10/2025    Patient Name: Leticia Tran    CHIEF COMPLAINT: Dementia with behavioral disturbance         SUBJECTIVE:    Patient is seen today for a follow up assessment.  Leticia was found resting in her room with a 1 1 sitter.  She continues to be compliant with her medications and remains in control.  She is pleasant and euthymic.  Denies any suicidal or homicidal thoughts.  Denies any perceptual disturbances.  Nursing staff states that she is sleeping better and eating well.  Patient continues to be very confused and is alert and oriented only to self.   Oriented to self only does not know where she is or cannot recognize this author. Patient was not even able to recall what she has eaten for breakfast. Patient will not be able to manage ADLs independently in the community.        Appetite:  [x] Adequate/Unchanged  [] Increased  [] Decreased      Sleep:       [] Adequate/Unchanged  [x] Fair  [] Poor      Group Attendance on Unit:   [] Yes   [x] Selectively    [] No    Compliant with scheduled medications: [x] Yes  [] No    Received emergency medications in past 24 hrs: [] Yes   [x] No    Medication Side Effects: Denies         Mental Status Exam  Level of consciousness: Alert and awake   Appearance: Appropriate attire for setting, resting in bed, with good  grooming and hygiene   Behavior/Motor: Approachable, engages with interviewer, no psychomotor abnormalities   Attitude toward examiner: Cooperative, attentive, good eye contact  Speech: spontaneous, normal rate, and normal volume   Mood: \"Doing well\"  Affect: Euthymic  Thought processes: Confabulating, loose associations  Thought content:  Denies homicidal ideation  Suicidal Ideation: Denies suicidal ideations, contracts for safety on the unit.   Delusions: No evidence of delusions.   Perceptual Disturbance: Denies AVH.  Patient does not appear to be responding to internal stimuli.   Cognition: Oriented to self,  Memory: impaired 
  Daily Progress Note  5/11/2025    Patient Name: Leticia Tran    CHIEF COMPLAINT: Dementia with behavioral disturbance         SUBJECTIVE:    Patient is seen today for a follow up assessment.  Leticia was found resting in her room with a 1 1 sitter.  She continues to be compliant with her medications and remains in control.  She is pleasant and euthymic.  Denies any suicidal or homicidal thoughts.  Denies any perceptual disturbances.  Nursing staff states that she is sleeping better and eating well.  Patient continues to be very confused and is alert and oriented only to self.   Oriented to self only. Patient will not be able to manage ADLs independently in the community.        Appetite:  [x] Adequate/Unchanged  [] Increased  [] Decreased      Sleep:       [] Adequate/Unchanged  [x] Fair  [] Poor      Group Attendance on Unit:   [] Yes   [x] Selectively    [] No    Compliant with scheduled medications: [x] Yes  [] No    Received emergency medications in past 24 hrs: [] Yes   [x] No    Medication Side Effects: Denies         Mental Status Exam  Level of consciousness: Alert and awake   Appearance: Appropriate attire for setting, resting in bed, with good  grooming and hygiene   Behavior/Motor: Approachable, engages with interviewer, no psychomotor abnormalities   Attitude toward examiner: Cooperative, attentive, good eye contact  Speech: spontaneous, normal rate, and normal volume   Mood: \"Doing well\"  Affect: Euthymic  Thought processes: Confabulating, loose associations  Thought content:  Denies homicidal ideation  Suicidal Ideation: Denies suicidal ideations, contracts for safety on the unit.   Delusions: No evidence of delusions.   Perceptual Disturbance: Denies AVH.  Patient does not appear to be responding to internal stimuli.   Cognition: Oriented to self,  Memory: impaired immediate recall and recent memory  Insight: Limited  Judgement: Limited      Data   height is 1.651 m (5' 5\") and weight is 71.7 kg 
  Daily Progress Note  5/12/2025    Patient Name: Leticia Tran    CHIEF COMPLAINT:  Dementia with behavioral disturbance           SUBJECTIVE:    Patient is seen today for a follow up assessment. She has been compliant with scheduled medications and behaviorally in control. She has not required any emergency medications for agitation in the past 24 hours. She was seated in chair upon approach with 1:1 sitter besides her. She is oriented to self only, unable to tell writer where she is or why she is hospitalized. She denies depression, suicidal ideation, and homicidal ideation. She denies hallucinations and is not observed responding to internal stimuli. She continues to present with confusion and significant cognitive deficits due to her dementia. She continues to lack insight and judgement into her illness. She requires continued inpatient hospitalization for safety and stabilization until appropriate discharge is arranged as she is unable to care for self.     Appetite:  [x] Adequate/Unchanged  [] Increased  [] Decreased      Sleep:       [] Adequate/Unchanged  [x] Fair  [] Poor      Group Attendance on Unit:   [] Yes   [x] Selectively    [] No    Compliant with scheduled medications: [x] Yes  [] No    Received emergency medications in past 24 hrs: [] Yes   [x] No    Medication Side Effects: Denies         Mental Status Exam  Level of consciousness: Alert and awake   Appearance: Appropriate attire for setting, seated in chair, with fair  grooming and hygiene   Behavior/Motor: Approachable, engages with interviewer, no psychomotor abnormalities   Attitude toward examiner: Cooperative, attentive, good eye contact  Speech: spontaneous, normal rate, and normal volume   Mood:  \"Good\"  Affect: Mood congruent  Thought processes: confabulation, loose associations    Thought content:  Denies homicidal ideation  Suicidal Ideation: Denies suicidal ideations, contracts for safety on the unit.   Delusions: No evidence of 
  Daily Progress Note  5/14/2025    Patient Name: Leticia Tran    CHIEF COMPLAINT:  Dementia with behavioral disturbance           SUBJECTIVE:      Patient is seen today for a follow up assessment.  Patient has been compliant with scheduled medications.  She has not required any emergency medications overnight.  She was walking around the unit with her walker and one-to-one sitter upon approach.  She reports her mood as \"A+\".  She is observed smiling and socializing with staff.  She continues to present with confusion, loose associations, and cognitive impairments due to her history of dementia.  She denies suicidal ideations or homicidal ideations.  She does not appear to be responding to internal stimuli.  She denies any issues with appetite or sleep.  She denies any side effects to medications.  Group participation encouraged by writer.    Due to her history of dementia, she lacks judgment and insight into her illness.  She is unable to care for herself in the community, and she poses a significant threat to herself.  She continues to require inpatient hospitalization for safety and stabilization until appropriate discharge facilities are arranged.    Appetite:  [x] Normal/Adequate/Unchanged  [] Increased  [] Decreased      Sleep:       [x] Normal/Adequate/Unchanged  [] Fair  [] Poor      Group Attendance on Unit:   [] Yes  [x] Selectively    [] No    Medication Side Effects:  Patient denies any medication side effects at the time of assessment.         Mental Status Exam  Level of consciousness: Alert and awake.   Appearance: Appropriate attire for setting, standing, with fair  grooming and hygiene.   Behavior/Motor: Approachable, cooperative, confused  Attitude toward examiner: Cooperative, pleasant, friendly, fair eye contact.  Speech: Normal rate, normal volume, normal tone.  Mood:  Patient reports \"A+\".   Affect: pleasant   Thought processes: loose associations, confabulation   Thought content: Denies 
  Daily Progress Note  5/15/2025    Patient Name: Leticia Tran    CHIEF COMPLAINT:  Dementia with behavioral disturbance            SUBJECTIVE:      Patient is seen today for a follow up assessment. Patient has been compliant with scheduled medications. She has not required any emergency medications overnight.  She remains pleasant and confused.  Patient was agreeable to assessment in her room today with one-to-one sitter present.  She reports her mood as \"good\".  She smiles at writer and laughs.  She continues to present with confusion, loose associations, and cognitive impairments due to her history of dementia.  She denies suicidal and homicidal ideations or any issues with appetite or sleep.  She does not appear to be responding to internal stimuli.  She denies side effects to medications.  Group participation continually encouraged by writer.    Due to her history of dementia, she lacks judgment and insight into her illness.  She is unable to care for herself in the community, and she poses a significant threat to herself.  She continues to require inpatient hospitalization for safety and stabilization until appropriate discharge facilities are arranged.     Appetite:  [x] Normal/Adequate/Unchanged  [] Increased  [] Decreased      Sleep:       [x] Normal/Adequate/Unchanged  [] Fair  [] Poor      Group Attendance on Unit:   [] Yes  [x] Selectively    [] No    Medication Side Effects:  Patient denies any medication side effects at the time of assessment.         Mental Status Exam  Level of consciousness: Alert and awake.   Appearance: Appropriate attire for setting, resting in bed, with fair  grooming and hygiene.   Behavior/Motor: Approachable, pleasant, cooperative, confused  AAttitude toward examiner: Cooperative, pleasant, friendly, fair eye contact.  Speech: Normal rate, normal volume, normal tone.  Mood:  Patient reports \"good\".   Affect: pleasant, calm  Thought processes: loose associations, 
  Daily Progress Note  5/17/2025    Patient Name: Leticia Tran    CHIEF COMPLAINT: Dementia with behavioral disturbance         SUBJECTIVE:    Patient is seen today for a follow up assessment.  She is found lying in bed awake.  1 1 sitter at bedside for safety.  She is pleasantly confused.  Thought processes are fragmented with confabulation.  She is oriented to self only.  Describes mood as \"good.\". Responds \"no\" when asked if experiencing suicidal or homicidal thoughts.  No evidence of delusions or paranoia.  She does not appear preoccupied by internal stimuli.  She is compliant taking Aricept 10 mg nightly.  No adverse effects reported.  Appetite and sleep quality reported as normal.  She has been without behavioral disturbances or need for emergency medications on the unit.  She continues to require extensive assistance with all ADLs.  Staff nurse reports arrangements to discharge home with family were canceled with concerns for multiple steps in the home.  Patient will require continued inpatient hospitalization until appropriate discharge arrangements are established.    Appetite:  [x] Adequate/Unchanged  [] Increased  [] Decreased      Sleep:       [x] Adequate/Unchanged  [] Fair  [] Poor      Group Attendance on Unit:   [] Yes   [x] Selectively    [] No    Compliant with scheduled medications: [x] Yes  [] No    Received emergency medications in past 24 hrs: [] Yes   [x] No    Medication Side Effects: Denies         Mental Status Exam  Level of consciousness: Alert and awake   Appearance: Appropriate attire for setting, resting in bed, with fair  grooming and hygiene   Behavior/Motor: Approachable, engages with interviewer, no psychomotor abnormalities   Attitude toward examiner: Cooperative, attentive, good eye contact  Speech: Normal rate, volume, and cheerful tone  Mood: \"Good\"  Affect: Pleasant, calm  Thought processes: Confabulations, loose associations  Thought content:  Denies homicidal 
  Daily Progress Note  5/18/2025    Patient Name: Leticia Tran    CHIEF COMPLAINT: Dementia with behavioral disturbance         SUBJECTIVE:    Patient is seen today for a follow up assessment. She is pleasantly confused on evaluation. She states that she slept well last night. Thought processes appear to be fragmented with confabulation. She denies any suicidal thoughts, paranoia, or auditory/visual hallucinations. Patient has remained in behavioral control and has not required any emergent PRN medications. Discharge planning is still pending at this time.     Patient continues to be compliant on nightly Aricept 10mg. She does not report any side effects. Vital signs show intermittent hypotension but otherwise she is hemodynamically stable. Iron studies resulted yesterday are unremarkable. Vitamin D and B12 have been replaced for macrocytosis.    Appetite:  [x] Adequate/Unchanged  [] Increased  [] Decreased      Sleep:       [x] Adequate/Unchanged  [] Fair  [] Poor      Group Attendance on Unit:   [] Yes   [x] Selectively    [] No    Compliant with scheduled medications: [x] Yes  [] No    Received emergency medications in past 24 hrs: [] Yes   [x] No    Medication Side Effects: Denies         Mental Status Exam  Level of consciousness: Alert and awake   Appearance: Appropriate attire for setting, sitting in chair, with fair  grooming and hygiene   Behavior/Motor: Approachable, engages with interviewer, no psychomotor abnormalities   Attitude toward examiner: Cooperative, attentive, good eye contact  Speech: Normal rate, volume, and cheerful tone  Mood: Euthymic   Affect: Pleasant, calm  Thought processes:  Confused, Confabulations, fragmented  Thought content:  Denies homicidal ideation  Suicidal Ideation: Denies suicidal ideations, contracts for safety on the unit.   Delusions: No evidence of delusions.   Perceptual Disturbance:  Patient does not appear to be responding to internal stimuli.   Cognition: Oriented 
  Daily Progress Note  5/3/2025    Patient Name: Leticia Tran    CHIEF COMPLAINT:  Dementia with behavioral disturbance          SUBJECTIVE:      Patient is seen today for a follow up assessment.  Patient remains in behavioral control.  She has not required any emergency medications overnight.  Patient was observed reading a book in the day area upon approach with one to one sitter present.  She was agreeable to assessment in the day area today.  She reports her mood as \"good\".  She is pleasantly confused, and she is friendly with writer.  She denies suicidal ideations, homicidal ideations. She continues to present with confusion due to her history of dementia. She is not observed responding to internal stimuli. When writer encouraged group participation, patient jokingly states that she does not want to go because of \"all those nuts\".    She lacks judgment and insight into her illness. She continues to require inpatient hospitalization for safety and stabilization.     Appetite:  [x] Normal/Adequate/Unchanged  [] Increased  [] Decreased      Sleep:       [x] Normal/Adequate/Unchanged  [] Fair  [] Poor      Group Attendance on Unit:   [] Yes  [] Selectively    [x] No    Medication Side Effects:  Patient denies any medication side effects at the time of assessment.         Mental Status Exam  Level of consciousness: Alert and awake.   Appearance: Appropriate attire for setting, seated in chair, with fair  grooming and hygiene.   Behavior/Motor: Approachable, friendly, pleasant.  Attitude toward examiner: Cooperative, friendly, fair eye contact.  Speech: Normal rate, normal volume, normal tone.  Mood:  Patient reports \"good\".   Affect: calm, pleasant  Thought processes: confused, disoriented.   Thought content: Denies homicidal ideation.  Suicidal Ideation: Denies suicidal ideations  Delusions: No evidence of delusions. Denies paranoia.  Perceptual Disturbance: Patient does not appear to be responding to internal 
  Daily Progress Note  5/4/2025    Patient Name: Leticia Tran    CHIEF COMPLAINT:  Dementia with behavioral disturbance           SUBJECTIVE:      Patient is seen today for a follow up assessment. Patient remains in behavioral control.  She has not required any emergency medications overnight.  Patient was observed socializing with a peer and staff in the day area with one to one sitter present.  She was agreeable to assessment in the day area today.  She reports her mood as \"more than expected\".  She is pleasantly confused, and she is friendly with writer.  She denies suicidal ideations, homicidal ideations. She continues to present with confusion due to her history of dementia. She is not observed responding to internal stimuli. Patient did attend one group therapy.  Group participation continually encouraged.      She lacks judgment and insight into her illness. She continues to require inpatient hospitalization for safety and stabilization.     Appetite:  [x] Normal/Adequate/Unchanged  [] Increased  [] Decreased      Sleep:       [x] Normal/Adequate/Unchanged  [] Fair  [] Poor      Group Attendance on Unit:   [] Yes  [x] Selectively    [] No    Medication Side Effects:  Patient denies any medication side effects at the time of assessment.         Mental Status Exam  Level of consciousness: Alert and awake.   Appearance: Appropriate attire for setting, seated in chair, with fair  grooming and hygiene.   Behavior/Motor: Approachable, friendly, pleasant.  Attitude toward examiner: Cooperative, friendly, fair eye contact.  Speech: Normal rate, normal volume, normal tone.  Mood:  Patient reports \"more than expected\".   Affect: calm, pleasant  Thought processes: confused  Thought content: Denies homicidal ideation.  Suicidal Ideation: Denies suicidal ideations  Delusions: No evidence of delusions. Denies paranoia.  Perceptual Disturbance: Patient does not appear to be responding to internal stimuli. Does not exhibit 
  Daily Progress Note  5/5/2025    Patient Name: Leticia Tran    CHIEF COMPLAINT:  Dementia with behavioral disturbance          SUBJECTIVE:    Patient is seen today for a follow up assessment.  She has been compliant with scheduled medications and behaviorally in control. She has not required any emergency medications for agitation in the past 24 hours. Nursing staff report that she has been pleasantly confused. Patient seated in chair during assessment. She states that she is \"good.\" She is oriented to self only, stating \"in here\" when asked about location and \"why\" when asked about situation. She denies depression, suicidal ideation, and homicidal ideation. She denies hallucinations and is not observed responding to internal stimuli. She continues to present with confusion due to her dementia. She continues to lack insight and judgement into her illness. She requires continued inpatient hospitalization for safety and stabilization until appropriate discharge is arranged.    Appetite:  [x] Adequate/Unchanged  [] Increased  [] Decreased      Sleep:       [x] Adequate/Unchanged  [] Fair  [] Poor      Group Attendance on Unit:   [] Yes   [x] Selectively    [] No    Compliant with scheduled medications: [x] Yes  [] No    Received emergency medications in past 24 hrs: [] Yes   [x] No    Medication Side Effects: Denies         Mental Status Exam  Level of consciousness: Alert and awake   Appearance: Appropriate attire for setting, seated in chair, with fair  grooming and hygiene   Behavior/Motor: Approachable, engages with interviewer, no psychomotor abnormalities   Attitude toward examiner: Cooperative, attentive, good eye contact  Speech: normal rate and normal volume   Mood:  \"Good\"  Affect: Mood congruent  Thought processes: coherent and loose associations   Thought content: Denies homicidal ideation  Suicidal Ideation: Denies suicidal ideations, contracts for safety on the unit.   Delusions: No evidence of 
  Daily Progress Note  5/6/2025    Patient Name: Leticia Tran    CHIEF COMPLAINT:  Dementia with behavioral disturbance          SUBJECTIVE:    Patient is seen today for a follow up assessment.  She has been compliant with scheduled medications and behaviorally in control. She has not required any emergency medications for agitation in the past 24 hours. Nursing staff report that she has been pleasantly confused and had difficulty sleeping last night. Patient will start doxepin as needed nightly for sleep. Patient seated in chair during assessment. She tells writer \"I'm here.\" She is oriented to self only, unable to tell writer where she is or why she is hospitalized. She denies depression, suicidal ideation, and homicidal ideation. She denies hallucinations and is not observed responding to internal stimuli. She continues to present with confusion and significant cognitive deficits due to her dementia. She continues to lack insight and judgement into her illness. She is unable to maintain self-care without assistance. She requires continued inpatient hospitalization for safety and stabilization until appropriate discharge is arranged.    Appetite:  [x] Adequate/Unchanged  [] Increased  [] Decreased      Sleep:       [] Adequate/Unchanged  [] Fair  [x] Poor      Group Attendance on Unit:   [] Yes   [x] Selectively    [] No    Compliant with scheduled medications: [x] Yes  [] No    Received emergency medications in past 24 hrs: [] Yes   [x] No    Medication Side Effects: Denies         Mental Status Exam  Level of consciousness: Alert and awake   Appearance: Appropriate attire for setting, seated in chair, with fair  grooming and hygiene   Behavior/Motor: Approachable, engages with interviewer, no psychomotor abnormalities   Attitude toward examiner: Cooperative, attentive, good eye contact  Speech: normal rate and normal volume   Mood: Pleasant, confused  Affect: Mood congruent  Thought processes: coherent and 
  Daily Progress Note  5/9/2025    Patient Name: Leticia Tran    CHIEF COMPLAINT: Dementia with behavioral disturbance         SUBJECTIVE:    Patient is seen today for a follow up assessment.  Leticia was found resting in her room with a 1 1 sitter.  She continues to be compliant with her medications and remains in control.  She is pleasant and euthymic.  Denies any suicidal or homicidal thoughts.  Denies any perceptual disturbances.  Nursing staff states that she is sleeping better and eating well.  Patient continues to be very confused and is alert and oriented only to self.  Believes it is the year 1920.  Unsure of where she is or why she is here.  Continues to show significant symptoms of dementia and is unable to care for herself independently.  Medication management discharge planning per attending.        Appetite:  [x] Adequate/Unchanged  [] Increased  [] Decreased      Sleep:       [] Adequate/Unchanged  [x] Fair  [] Poor      Group Attendance on Unit:   [] Yes   [x] Selectively    [] No    Compliant with scheduled medications: [x] Yes  [] No    Received emergency medications in past 24 hrs: [] Yes   [x] No    Medication Side Effects: Denies         Mental Status Exam  Level of consciousness: Alert and awake   Appearance: Appropriate attire for setting, resting in bed, with good  grooming and hygiene   Behavior/Motor: Approachable, engages with interviewer, no psychomotor abnormalities   Attitude toward examiner: Cooperative, attentive, good eye contact  Speech: spontaneous, normal rate, and normal volume   Mood: \"Doing well\"  Affect: Euthymic  Thought processes: Confabulating, loose associations  Thought content:  Denies homicidal ideation  Suicidal Ideation: Denies suicidal ideations, contracts for safety on the unit.   Delusions: No evidence of delusions.   Perceptual Disturbance: Denies AVH.  Patient does not appear to be responding to internal stimuli.   Cognition: Oriented to self,  Memory: impaired 
CLINICAL PHARMACY NOTE: MEDS TO BEDS    Total # of Prescriptions Filled: 3   The following medications were delivered to the patient:  Trazodone HCL 50MG Tablets  Levothyroxine Sodium 137MCG Tablets  Donepezil HCL 10MG Tablets    Additional Documentation:  Delivered to LUCAS Espino LPN at 11:16AM 5/16/25. Vitamin D, Vitamin B, and Melatonin not covered, placed on hold.   
[facility]   OCCUPATIONAL THERAPY MISSED TREATMENT NOTE   INPATIENT   Date: 25  Patient Name: Leticia Tran       Room: 0213/0213-01  MRN: [id number]   Account #: [id number]    : 1945  (79 y.o.)  Gender: female   Referring Practitioner: Miguel Angel Price MD  Diagnosis: Dementia with behavioral disturbance             REASON FOR MISSED TREATMENT:  OT treatment attempted. [facility] staff reported that pt has been awake since 2AM and had just fallen asleep, recommending it would be best to defer treatment at this time. OT will follow and attempt as able.     -    [id number]        Electronically signed by GIANCARLO Cazares on 25 at 2:24 PM EDT   
Comprehensive Nutrition Assessment    Type and Reason for Visit:  Positive nutrition screen (wt loss, poor appetite)    Nutrition Recommendations/Plan:   Will provide Regular diet with food cut up for pt  Will provide Ensure Plus High Protein all trays     Malnutrition Assessment:  Malnutrition Status:  At risk for malnutrition (05/01/25 1030)    Context:  Acute Illness     Findings of the 6 clinical characteristics of malnutrition:  Energy Intake:  50% or less of estimated energy requirements for 5 or more days  Weight Loss:  Unable to assess     Body Fat Loss:  Unable to assess     Muscle Mass Loss:  Unable to assess    Fluid Accumulation:  No fluid accumulation     Strength:  Not Performed    Nutrition Assessment:    Pt was admitted to [facility] on 4/24 due to Myxedema Coma. During that admit, she refused all trays per RN documentation. Pt is now admitted to [facility] for treatment of dementia. No recent wt history available however pt is down 14# since 2023.    Nutrition Related Findings:    no edema, Labs: Glu 129, Meds: Reviewed Wound Type: None       Current Nutrition Intake & Therapies:    Average Meal Intake: 0%     ADULT ORAL NUTRITION SUPPLEMENT; Breakfast, Lunch, Dinner; Standard High Calorie/High Protein Oral Supplement  ADULT DIET; Regular    Anthropometric Measures:  Height: 165.1 cm (5' 5\")  Ideal Body Weight (IBW): 125 lbs (57 kg)    Admission Body Weight: 71.7 kg (158 lb)  Current Body Weight: 71.7 kg (158 lb 1.1 oz), 126.5 % IBW. Weight Source: Bed scale  Current BMI (kg/m2): 26.3  Usual Body Weight: 78 kg (172 lb) (2023)     % Weight Change (Calculated): -8.1                    BMI Categories: Overweight (BMI 25.0-29.9)    Estimated Daily Nutrient Needs:  Energy Requirements Based On: Formula  Weight Used for Energy Requirements: Admission  Energy (kcal/day): [place] x 1.2= 1500 kcal  Weight Used for Protein Requirements: Admission  Protein (g/day): 1.2g/kg= 85-90 g  Method Used for Fluid 
Comprehensive Nutrition Assessment    Type and Reason for Visit:  Reassess    Nutrition Recommendations/Plan:   Will continue Regular diet and Ensure Plus High Protein all trays     Malnutrition Assessment:  Malnutrition Status:  At risk for malnutrition (05/01/25 1030)    Context:  Acute Illness     Findings of the 6 clinical characteristics of malnutrition:  Energy Intake:  50% or less of estimated energy requirements for 5 or more days  Weight Loss:  Unable to assess     Body Fat Loss:  Unable to assess     Muscle Mass Loss:  Unable to assess    Fluid Accumulation:  No fluid accumulation     Strength:  Not Performed    Nutrition Assessment:    Pt is now consuming more than 50% of most trays and drinking supplements suggesting nutrition needs met.    Nutrition Related Findings:    no edema, Labs/Meds: reviewed Wound Type:  (Traumatic wound)       Current Nutrition Intake & Therapies:    Average Meal Intake: 51-75%, %  Average Supplements Intake: %  ADULT ORAL NUTRITION SUPPLEMENT; Breakfast, Lunch, Dinner; Standard High Calorie/High Protein Oral Supplement  ADULT DIET; Regular    Anthropometric Measures:  Height: 165.1 cm (5' 5\")  Ideal Body Weight (IBW): 125 lbs (57 kg)    Admission Body Weight: 71.7 kg (158 lb)  Current Body Weight: 71.7 kg (158 lb 1.1 oz), 126.5 % IBW. Weight Source: Bed scale  Current BMI (kg/m2): 26.3  Usual Body Weight: 78 kg (172 lb) (2023)     % Weight Change (Calculated): -8.1                    BMI Categories: Overweight (BMI 25.0-29.9)    Estimated Daily Nutrient Needs:  Energy Requirements Based On: Formula  Weight Used for Energy Requirements: Admission  Energy (kcal/day): [place] x 1.2= 1500 kcal  Weight Used for Protein Requirements: Admission  Protein (g/day): 1.2g/kg= 85-90 g  Method Used for Fluid Requirements: ml/Kg  Fluid (ml/day): 25 ml/kg= 1800 ml    Nutrition Diagnosis:   Inadequate oral intake related to psychological cause or life stress as evidenced by 
[facility]   Occupational Therapy Evaluation  Date: 25  Patient Name: Leticia Tran       Room: 0213/0213-01  MRN: [id number]  Account: [id number]   : 1945  (79 y.o.) Gender: female     Discharge Recommendations:  Further Occupational Therapy is recommended upon facility discharge.         Referring Practitioner: Miguel Angel Price MD  Diagnosis: Dementia with behavioral disturbance           Past Medical History:  has a past medical history of Autoimmune thyroiditis, Dementia (HCC), and Osteoporosis.    Past Surgical History:   has no past surgical history on file.    Restrictions  Restrictions/Precautions  Restrictions/Precautions: General Precautions, Fall Risk, Skin (skin tear L distal forearm)  Activity Level: Up with Assist  Required Braces or Orthoses?: No  Position Activity Restriction  Other Position/Activity Restrictions: 1:1 sitter      Subjective  Subjective: Pt requiring orientation throughout session but remaining pleasant for therapy  Comments: [facility] staff ok'd OT/PT eval with PT Margaret  Pain  Pre-Pain: 0 (no observable signs of pain)  Post-Pain: 0    Social/Functional History  Social/Functional History  Lives With: Alone (Per documentation, pt's gas in home has been turned off for safety purposes d/t dementia)  Type of Home: House  Has the patient had two or more falls in the past year or any fall with injury in the past year?: No (adamantly denies)  Prior Level of Assist for ADLs:  (Pt reports she gets self dressed)  Prior Level of Assist for Ambulation: Independent household ambulator, with or without device (reports she ambulates with device sometimes, either RW vs rollator?)  Prior Level of Assist for Transfers: Independent  Additional Comments: No family/caregiver present at bedside throughout therapy eval. Obtained information above is per pt's responses. Pt is poor historian d/t dementia.    Objective    ADL  Feeding: Setup, Based on clinical judgement  Grooming: 
IN-PATIENT SERVICE  [facility] Internal Medicine    Progress Note            Date:   5/16/2025  Patient name:  Leticia Tran  Date of admission:  4/30/2025  5:57 PM  MRN:   [id number]  Account:  [id number]  YOB: 1945  PCP:    Neema Hsieh MD  Room:   [address] [place] [address]  Code Status:    DNR-CC    Physician Requesting Consult: Miguel Angel Pirce MD    Reason for Consult:  medical management    Chief Complaint:   Dementia, depression    History Obtained From:     Patient medical record nursing staff    History of Present Illness:   Patient, has past medical history of multiple medical problems which include autoimmune thyroiditis, dementia, osteoporosis patient was originally admitted at [facility] with altered mental status  Found to have TSH of more than 300, treated in lines of myxedema coma with IV Synthroid, IV steroid  Patient was also evaluated by neurologist, underwent MRI brain  Patient apparently was not taking her medication for thyroid control  She was transferred to [facility] unit with agitation  Required Olvera's catheter  Patient CODE STATUS was changed to DNR CC      Past Medical History:     Past Medical History:   Diagnosis Date    Autoimmune thyroiditis     Dementia (HCC)     Osteoporosis         Past Surgical History:     No past surgical history on file.     Medications Prior to Admission:     Prior to Admission medications    Medication Sig Start Date End Date Taking? Authorizing Provider   vitamin B-12 (CYANOCOBALAMIN) 1000 MCG tablet Take 1 tablet by mouth daily 5/16/25  Yes Miguel Angel Price MD   donepezil (ARICEPT) 10 MG tablet Take 1 tablet by mouth nightly 5/16/25  Yes Miguel Angel Price MD   levothyroxine (SYNTHROID) 137 MCG tablet Take 1 tablet by mouth Daily 5/17/25  Yes Miguel Angel Price MD   vitamin D3 (CHOLECALCIFEROL) 25 MCG (1000 UT) TABS tablet Take 1 tablet by mouth daily 5/16/25  Yes Miguel Angel Price MD   melatonin 3 MG TABS tablet Take 
IN-PATIENT SERVICE  [facility] Internal Medicine    CONSULTATION / HISTORY AND PHYSICAL EXAMINATION            Date:   5/7/2025  Patient name:  Leticia Tran  Date of admission:  4/30/2025  5:57 PM  MRN:   [id number]  Account:  [id number]  YOB: 1945  PCP:    Neema Hsieh MD  Room:   [address] [place] [address]  Code Status:    DNR-CC    Physician Requesting Consult: Miguel Angel Price MD    Reason for Consult:  medical management    Chief Complaint:   Dementia, depression    History Obtained From:     Patient medical record nursing staff    History of Present Illness:   Patient, has past medical history of multiple medical problems which include autoimmune thyroiditis, dementia, osteoporosis patient was originally admitted at [facility] with altered mental status  Found to have TSH of more than 300, treated in lines of myxedema coma with IV Synthroid, IV steroid  Patient was also evaluated by neurologist, underwent MRI brain  Patient apparently was not taking her medication for thyroid control  She was transferred to [facility] unit with agitation  Required Olvera's catheter  Patient CODE STATUS was changed to DNR CC  Patient per nursing report, was sleepy in the morning but now at the time my evaluation patient is alert oriented to time place person, she ate her breakfast    Past Medical History:     Past Medical History:   Diagnosis Date    Autoimmune thyroiditis     Dementia (HCC)     Osteoporosis         Past Surgical History:     No past surgical history on file.     Medications Prior to Admission:     Prior to Admission medications    Medication Sig Start Date End Date Taking? Authorizing Provider   QUEtiapine (SEROQUEL) 50 MG tablet Take 1 tablet by mouth nightly 4/30/25   Nitin Stout DO   donepezil (ARICEPT) 5 MG tablet Take 2 tablets by mouth nightly 11/21/23   Nathaniel Riggins MD   vitamin B-12 (CYANOCOBALAMIN) 1000 MCG tablet Take 1 tablet by mouth daily 11/21/23 
IN-PATIENT SERVICE  [facility] Internal Medicine    CONSULTATION / HISTORY AND PHYSICAL EXAMINATION            Date:   5/4/2025  Patient name:  Leticia Tran  Date of admission:  4/30/2025  5:57 PM  MRN:   [id number]  Account:  [id number]  YOB: 1945  PCP:    Neema Hsieh MD  Room:   [address] [place] [address]  Code Status:    DNR-CC    Physician Requesting Consult: Miguel Angel Price MD    Reason for Consult:  medical management    Chief Complaint:   Dementia, depression    History Obtained From:     Patient medical record nursing staff    History of Present Illness:   Patient, has past medical history of multiple medical problems which include autoimmune thyroiditis, dementia, osteoporosis patient was originally admitted at [facility] with altered mental status  Found to have TSH of more than 300, treated in lines of myxedema coma with IV Synthroid, IV steroid  Patient was also evaluated by neurologist, underwent MRI brain  Patient apparently was not taking her medication for thyroid control  She was transferred to [facility] unit with agitation  Required Olvera's catheter  Patient CODE STATUS was changed to DNR CC  Patient per nursing report, was sleepy in the morning but now at the time my evaluation patient is alert oriented to time place person, she ate her breakfast    Past Medical History:     Past Medical History:   Diagnosis Date    Autoimmune thyroiditis     Dementia (HCC)     Osteoporosis         Past Surgical History:     No past surgical history on file.     Medications Prior to Admission:     Prior to Admission medications    Medication Sig Start Date End Date Taking? Authorizing Provider   QUEtiapine (SEROQUEL) 50 MG tablet Take 1 tablet by mouth nightly 4/30/25   Nitin Stout DO   donepezil (ARICEPT) 5 MG tablet Take 2 tablets by mouth nightly 11/21/23   Nathaniel Riggins MD   vitamin B-12 (CYANOCOBALAMIN) 1000 MCG tablet Take 1 tablet by mouth daily 11/21/23 
IN-PATIENT SERVICE  [facility] Internal Medicine    Progress Note            Date:   5/12/2025  Patient name:  Leticia Tran  Date of admission:  4/30/2025  5:57 PM  MRN:   [id number]  Account:  [id number]  YOB: 1945  PCP:    Neema Hsieh MD  Room:   [address] [place] [address]  Code Status:    DNR-CC    Physician Requesting Consult: Miguel Angel Price MD    Reason for Consult:  medical management    Chief Complaint:   Dementia, depression    History Obtained From:     Patient medical record nursing staff    History of Present Illness:   Patient, has past medical history of multiple medical problems which include autoimmune thyroiditis, dementia, osteoporosis patient was originally admitted at [facility] with altered mental status  Found to have TSH of more than 300, treated in lines of myxedema coma with IV Synthroid, IV steroid  Patient was also evaluated by neurologist, underwent MRI brain  Patient apparently was not taking her medication for thyroid control  She was transferred to [facility] unit with agitation  Required Olvera's catheter  Patient CODE STATUS was changed to DNR CC      Past Medical History:     Past Medical History:   Diagnosis Date    Autoimmune thyroiditis     Dementia (HCC)     Osteoporosis         Past Surgical History:     No past surgical history on file.     Medications Prior to Admission:     Prior to Admission medications    Medication Sig Start Date End Date Taking? Authorizing Provider   QUEtiapine (SEROQUEL) 50 MG tablet Take 1 tablet by mouth nightly 4/30/25   Nitin Stout DO   donepezil (ARICEPT) 5 MG tablet Take 2 tablets by mouth nightly 11/21/23   Nathaniel Riggins MD   vitamin B-12 (CYANOCOBALAMIN) 1000 MCG tablet Take 1 tablet by mouth daily 11/21/23   Nathaniel Riggins MD   vitamin D3 (CHOLECALCIFEROL) 25 MCG (1000 UT) TABS tablet Take 1 tablet by mouth daily 11/21/23   Nathaniel Riggins MD   melatonin 1 MG tablet Take 1 tablet by mouth 
IN-PATIENT SERVICE  [facility] Internal Medicine    Progress Note            Date:   5/20/2025  Patient name:  Leticia Tran  Date of admission:  4/30/2025  5:57 PM  MRN:   [id number]  Account:  [id number]  YOB: 1945  PCP:    Neema Hsieh MD  Room:   [address] [place] [address]  Code Status:    DNR-CC    Physician Requesting Consult: Miguel Angel Price MD    Reason for Consult:  medical management    Chief Complaint:   Dementia, depression    History Obtained From:     Patient medical record nursing staff    History of Present Illness:   Patient, has past medical history of multiple medical problems which include autoimmune thyroiditis, dementia, osteoporosis patient was originally admitted at [facility] with altered mental status  Found to have TSH of more than 300, treated in lines of myxedema coma with IV Synthroid, IV steroid  Patient was also evaluated by neurologist, underwent MRI brain  Patient apparently was not taking her medication for thyroid control  She was transferred to [facility] unit with agitation  Required Olvera's catheter  Patient CODE STATUS was changed to DNR CC      Past Medical History:     Past Medical History:   Diagnosis Date    Autoimmune thyroiditis     Dementia (HCC)     Osteoporosis         Past Surgical History:     No past surgical history on file.     Medications Prior to Admission:     Prior to Admission medications    Medication Sig Start Date End Date Taking? Authorizing Provider   vitamin B-12 (CYANOCOBALAMIN) 1000 MCG tablet Take 1 tablet by mouth daily 5/16/25  Yes Miguel Angel Price MD   donepezil (ARICEPT) 10 MG tablet Take 1 tablet by mouth nightly 5/16/25  Yes Miguel Angel Price MD   levothyroxine (SYNTHROID) 137 MCG tablet Take 1 tablet by mouth Daily 5/17/25  Yes Miguel Angel Price MD   vitamin D3 (CHOLECALCIFEROL) 25 MCG (1000 UT) TABS tablet Take 1 tablet by mouth daily 5/16/25  Yes Miguel Angel Price MD   melatonin 3 MG TABS tablet Take 
IN-PATIENT SERVICE  [facility] Internal Medicine    Progress Note            Date:   5/19/2025  Patient name:  Leticia Tran  Date of admission:  4/30/2025  5:57 PM  MRN:   [id number]  Account:  [id number]  YOB: 1945  PCP:    Neema Hsieh MD  Room:   [address] [place] [address]  Code Status:    DNR-CC    Physician Requesting Consult: Miguel Angel Price MD    Reason for Consult:  medical management    Chief Complaint:   Dementia, depression    History Obtained From:     Patient medical record nursing staff    History of Present Illness:   Patient, has past medical history of multiple medical problems which include autoimmune thyroiditis, dementia, osteoporosis patient was originally admitted at [facility] with altered mental status  Found to have TSH of more than 300, treated in lines of myxedema coma with IV Synthroid, IV steroid  Patient was also evaluated by neurologist, underwent MRI brain  Patient apparently was not taking her medication for thyroid control  She was transferred to [facility] unit with agitation  Required Olvera's catheter  Patient CODE STATUS was changed to DNR CC      Past Medical History:     Past Medical History:   Diagnosis Date    Autoimmune thyroiditis     Dementia (HCC)     Osteoporosis         Past Surgical History:     No past surgical history on file.     Medications Prior to Admission:     Prior to Admission medications    Medication Sig Start Date End Date Taking? Authorizing Provider   vitamin B-12 (CYANOCOBALAMIN) 1000 MCG tablet Take 1 tablet by mouth daily 5/16/25  Yes Miguel Angel Price MD   donepezil (ARICEPT) 10 MG tablet Take 1 tablet by mouth nightly 5/16/25  Yes Miguel Angel Price MD   levothyroxine (SYNTHROID) 137 MCG tablet Take 1 tablet by mouth Daily 5/17/25  Yes Miguel Angel Price MD   vitamin D3 (CHOLECALCIFEROL) 25 MCG (1000 UT) TABS tablet Take 1 tablet by mouth daily 5/16/25  Yes Miguel Angel Price MD   melatonin 3 MG TABS tablet Take 
IN-PATIENT SERVICE  [facility] Internal Medicine    CONSULTATION / HISTORY AND PHYSICAL EXAMINATION            Date:   5/2/2025  Patient name:  Leticia Tran  Date of admission:  4/30/2025  5:57 PM  MRN:   [id number]  Account:  [id number]  YOB: 1945  PCP:    Neema Hsieh MD  Room:   [address] [place] [address]  Code Status:    DNR-CC    Physician Requesting Consult: Miguel Angel Price MD    Reason for Consult:  medical management    Chief Complaint:   Dementia, depression    History Obtained From:     Patient medical record nursing staff    History of Present Illness:   Patient, has past medical history of multiple medical problems which include autoimmune thyroiditis, dementia, osteoporosis patient was originally admitted at [facility] with altered mental status  Found to have TSH of more than 300, treated in lines of myxedema coma with IV Synthroid, IV steroid  Patient was also evaluated by neurologist, underwent MRI brain  Patient apparently was not taking her medication for thyroid control  She was transferred to [facility] unit with agitation  Required Olvera's catheter  Patient CODE STATUS was changed to DNR CC  Patient per nursing report, was sleepy in the morning but now at the time my evaluation patient is alert oriented to time place person, she ate her breakfast    Past Medical History:     Past Medical History:   Diagnosis Date    Autoimmune thyroiditis     Dementia (HCC)     Osteoporosis         Past Surgical History:     No past surgical history on file.     Medications Prior to Admission:     Prior to Admission medications    Medication Sig Start Date End Date Taking? Authorizing Provider   QUEtiapine (SEROQUEL) 50 MG tablet Take 1 tablet by mouth nightly 4/30/25   Nitin Stout DO   donepezil (ARICEPT) 5 MG tablet Take 2 tablets by mouth nightly 11/21/23   Nathaniel Riggins MD   vitamin B-12 (CYANOCOBALAMIN) 1000 MCG tablet Take 1 tablet by mouth daily 11/21/23 
IN-PATIENT SERVICE  [facility] Internal Medicine    CONSULTATION / HISTORY AND PHYSICAL EXAMINATION            Date:   5/6/2025  Patient name:  Leticia Tran  Date of admission:  4/30/2025  5:57 PM  MRN:   [id number]  Account:  [id number]  YOB: 1945  PCP:    Nemea Hsieh MD  Room:   [address] [place] [address]  Code Status:    DNR-CC    Physician Requesting Consult: Miguel Angel Price MD    Reason for Consult:  medical management    Chief Complaint:   Dementia, depression    History Obtained From:     Patient medical record nursing staff    History of Present Illness:   Patient, has past medical history of multiple medical problems which include autoimmune thyroiditis, dementia, osteoporosis patient was originally admitted at [facility] with altered mental status  Found to have TSH of more than 300, treated in lines of myxedema coma with IV Synthroid, IV steroid  Patient was also evaluated by neurologist, underwent MRI brain  Patient apparently was not taking her medication for thyroid control  She was transferred to [facility] unit with agitation  Required Olvera's catheter  Patient CODE STATUS was changed to DNR CC  Patient per nursing report, was sleepy in the morning but now at the time my evaluation patient is alert oriented to time place person, she ate her breakfast    Past Medical History:     Past Medical History:   Diagnosis Date    Autoimmune thyroiditis     Dementia (HCC)     Osteoporosis         Past Surgical History:     No past surgical history on file.     Medications Prior to Admission:     Prior to Admission medications    Medication Sig Start Date End Date Taking? Authorizing Provider   QUEtiapine (SEROQUEL) 50 MG tablet Take 1 tablet by mouth nightly 4/30/25   Nitin Stout DO   donepezil (ARICEPT) 5 MG tablet Take 2 tablets by mouth nightly 11/21/23   Nathaniel Riggins MD   vitamin B-12 (CYANOCOBALAMIN) 1000 MCG tablet Take 1 tablet by mouth daily 11/21/23 
IN-PATIENT SERVICE  [facility] Internal Medicine    CONSULTATION / HISTORY AND PHYSICAL EXAMINATION            Date:   5/3/2025  Patient name:  Leticia Tran  Date of admission:  4/30/2025  5:57 PM  MRN:   [id number]  Account:  [id number]  YOB: 1945  PCP:    Neema Hsieh MD  Room:   [address] [place] [address]  Code Status:    DNR-CC    Physician Requesting Consult: Miguel Angel Price MD    Reason for Consult:  medical management    Chief Complaint:   Dementia, depression    History Obtained From:     Patient medical record nursing staff    History of Present Illness:   Patient, has past medical history of multiple medical problems which include autoimmune thyroiditis, dementia, osteoporosis patient was originally admitted at [facility] with altered mental status  Found to have TSH of more than 300, treated in lines of myxedema coma with IV Synthroid, IV steroid  Patient was also evaluated by neurologist, underwent MRI brain  Patient apparently was not taking her medication for thyroid control  She was transferred to [facility] unit with agitation  Required Olvera's catheter  Patient CODE STATUS was changed to DNR CC  Patient per nursing report, was sleepy in the morning but now at the time my evaluation patient is alert oriented to time place person, she ate her breakfast    Past Medical History:     Past Medical History:   Diagnosis Date    Autoimmune thyroiditis     Dementia (HCC)     Osteoporosis         Past Surgical History:     No past surgical history on file.     Medications Prior to Admission:     Prior to Admission medications    Medication Sig Start Date End Date Taking? Authorizing Provider   QUEtiapine (SEROQUEL) 50 MG tablet Take 1 tablet by mouth nightly 4/30/25   Nitin Stout DO   donepezil (ARICEPT) 5 MG tablet Take 2 tablets by mouth nightly 11/21/23   Nathaniel Riggins MD   vitamin B-12 (CYANOCOBALAMIN) 1000 MCG tablet Take 1 tablet by mouth daily 11/21/23 
IN[facility]PATIENT SERVICE  [facility]    CONSULTATION / HISTORY AND PHYSICAL EXAMINATION            Date:   5/8/2025  Patient name:  Leticia Tran  Date of admission:  4/30/2025  5:57 PM  MRN:   [id number]  Account:  [id number]  YOB: 1945  PCP:    Neema Hsieh MD  Room:   [address] [place] [address]  Code Status:    DNR-CC    Physician Requesting Consult: Miguel Angel Price MD    Reason for Consult:  medical management    Chief Complaint:   Dementia, depression    History Obtained From:     Patient medical record nursing staff    History of Present Illness:   Patient, has past medical history of multiple medical problems which include autoimmune thyroiditis, dementia, osteoporosis patient was originally admitted at [facility] with altered mental status  Found to have TSH of more than 300, treated in lines of myxedema coma with IV Synthroid, IV steroid  Patient was also evaluated by neurologist, underwent MRI brain  Patient apparently was not taking her medication for thyroid control  She was transferred to [facility] unit with agitation  Required Olvera's catheter  Patient CODE STATUS was changed to DNR CC  Patient per nursing report, was sleepy in the morning but now at the time my evaluation patient is alert oriented to time place person, she ate her breakfast    Past Medical History:     Past Medical History:   Diagnosis Date    Autoimmune thyroiditis     Dementia (HCC)     Osteoporosis         Past Surgical History:     No past surgical history on file.     Medications Prior to Admission:     Prior to Admission medications    Medication Sig Start Date End Date Taking? Authorizing Provider   QUEtiapine (SEROQUEL) 50 MG tablet Take 1 tablet by mouth nightly 4/30/25   Nitin Stout DO   donepezil (ARICEPT) 5 MG tablet Take 2 tablets by mouth nightly 11/21/23   Nathaniel Riggins MD   vitamin B-12 (CYANOCOBALAMIN) 1000 MCG tablet Take 1 tablet by mouth daily 11/21/23 
IN-PATIENT SERVICE  [facility] Internal Medicine    CONSULTATION / HISTORY AND PHYSICAL EXAMINATION            Date:   5/5/2025  Patient name:  Leticia Tran  Date of admission:  4/30/2025  5:57 PM  MRN:   [id number]  Account:  [id number]  YOB: 1945  PCP:    Neema Hsieh MD  Room:   [address] [place] [address]  Code Status:    DNR-CC    Physician Requesting Consult: Miguel Angel Price MD    Reason for Consult:  medical management    Chief Complaint:   Dementia, depression    History Obtained From:     Patient medical record nursing staff    History of Present Illness:   Patient, has past medical history of multiple medical problems which include autoimmune thyroiditis, dementia, osteoporosis patient was originally admitted at [facility] with altered mental status  Found to have TSH of more than 300, treated in lines of myxedema coma with IV Synthroid, IV steroid  Patient was also evaluated by neurologist, underwent MRI brain  Patient apparently was not taking her medication for thyroid control  She was transferred to [facility] unit with agitation  Required Olvera's catheter  Patient CODE STATUS was changed to DNR CC  Patient per nursing report, was sleepy in the morning but now at the time my evaluation patient is alert oriented to time place person, she ate her breakfast    Past Medical History:     Past Medical History:   Diagnosis Date    Autoimmune thyroiditis     Dementia (HCC)     Osteoporosis         Past Surgical History:     No past surgical history on file.     Medications Prior to Admission:     Prior to Admission medications    Medication Sig Start Date End Date Taking? Authorizing Provider   QUEtiapine (SEROQUEL) 50 MG tablet Take 1 tablet by mouth nightly 4/30/25   Nitin Stout DO   donepezil (ARICEPT) 5 MG tablet Take 2 tablets by mouth nightly 11/21/23   Nathaniel Riggins MD   vitamin B-12 (CYANOCOBALAMIN) 1000 MCG tablet Take 1 tablet by mouth daily 11/21/23 
[facility]   OCCUPATIONAL THERAPY MISSED TREATMENT NOTE   INPATIENT   Date: 25  Patient Name: Leticia Tran       Room: 0213/0213-  MRN: [id number]   Account #: [id number]    : 1945  (79 y.o.)  Gender: female   Referring Practitioner: Miguel Angel Price MD  Diagnosis: Dementia with behavioral disturbance             REASON FOR MISSED TREATMENT:  OT treatment attempted. Pt adamantly but politely declining, replying \"nope\" to all options for activities. Pt educated on importance of therapy but continues to decline multiple times. OT will follow and continue to attempt.    - [id number]          Electronically signed by GIANCARLO Cazares on 25 at 4:14 PM EDT   
[facility]   OCCUPATIONAL THERAPY MISSED TREATMENT NOTE   INPATIENT   Date: 25  Patient Name: Leticia Tran       Room: 0213/0213-01  MRN: [id number]   Account #: [id number]    : 1945  (79 y.o.)  Gender: female   Referring Practitioner: Miguel Angel Price MD  Diagnosis: Dementia with behavioral disturbance             REASON FOR MISSED TREATMENT:  Patient declined   -    Upon arrival, nursing reporting patient just laying down. 1:1 sitter reporting patient laying down due to becoming fatigued and impacting patients stability with RW. Patient pleasantly declining OT this date. OT will continue to follow. 1400            Electronically signed by GIANCARLO Farley on 25 at 2:09 PM EDT   
[facility]  Progress Note      NAME:  Leticia Tran  MEDICAL RECORD NUMBER:  [id number]  AGE: 79 y.o.   GENDER: female  : 1945  TODAY'S DATE:  2025    [facility] nurse to reassess patient's wound to left forearm. Patient sitting in common area with 1:1. Patient approves wound assessment while sitting at common area. Old dressing removed, site cleansed with saline and dried, and wound measured. Skin flap that was previously covering wound bed is no longer present. Wound bed is pink/red and purple/maroon. Iva-wound has some slight ecchymotic areas. Wound dressed with new emulsion gauze and foam dressing.   Please continue cleansing site with soap and water, pat dry. Apply emulsion gauze and foam dressing every three days and as needed if soiled or lifting.       Measurements:  Wound 25 Arm Lower;Left (Active)   Wound Image   25 1242   Wound Etiology Traumatic 25 1242   Dressing Status New dressing applied;Old drainage noted 25 1242   Wound Cleansed Cleansed with saline 25 1242   Dressing/Treatment Petroleum gauze;Foam 25 1242   Wound Length (cm) 1.1 cm 25 1242   Wound Width (cm) 1.8 cm 25 1242   Wound Depth (cm) 0.1 cm 25 1242   Wound Surface Area (cm^2) 1.98 cm^2 25 1242   Change in Wound Size % (l*w) 50.5 25 1242   Wound Volume (cm^3) 0.198 cm^3 25 1242   Wound Healing % 51 25 1242   Wound Assessment Pink/red;Purple/maroon 25 1242   Drainage Amount Scant (moist but unmeasurable) 25 124   Drainage Description Serosanguinous 25 1242   Odor None 25 1242   Iva-wound Assessment Ecchymosis 25 1242   Margins Defined edges 25 124   Number of days: 11     SELWYN ContrerasN, RN  [facility]  Wound, Ostomy, and Continence Nursing  [phone]    
[facility]  Progress Note      NAME:  Leticia Tran  MEDICAL RECORD NUMBER:  [id number]  AGE: 79 y.o.   GENDER: female  : 1945  TODAY'S DATE:  2025    [facility] nurse to unit to assess patient's skin tear to left lower arm. Patient sitting in common area with staff during assessment and agreeable to assessment in common area. Old dressing removed. No drainage noted. Wound cleansed with saline. Patient denies pain to site. Red wound bed partially covered by purple/maroon skin flap. Skin flap kept in place to enhance healing. Slight ecchymosis noted to per-wound. Oil emulsion gauze and foam dressing placed over wound.   Please continue cleaning wound with soap and water, pat dry, and applying oil emulsion gauze and foam dressing every three days and as needed if soiled or lifting.       Measurements:  Wound 25 Arm Lower;Left (Active)   Wound Image   25 1049   Wound Etiology Traumatic 25 1049   Dressing Status New dressing applied 25 1049   Wound Cleansed Cleansed with saline 25 1049   Dressing/Treatment Petroleum gauze;Foam 25 1049   Wound Length (cm) 1.9 cm 25 1049   Wound Width (cm) 1.4 cm 25 1049   Wound Depth (cm) 0.1 cm 25 104   Wound Surface Area (cm^2) 2.66 cm^2 25 104   Change in Wound Size % (l*w) 33.5 25 104   Wound Volume (cm^3) 0.266 cm^3 25 1049   Wound Healing % 34 25 1049   Wound Assessment Pink/red;Purple/maroon 25 1049   Drainage Amount None (dry) 25 1049   Odor None 25 104   Iva-wound Assessment Ecchymosis 25 1049   Margins Undefined edges 25 1049   Number of days: 6     SELWYN ContrerasN, RN  [facility]  Wound, Ostomy, and Continence Nursing  [phone]    
Physical Therapy        Physical Therapy Cancel Note      DATE: 2025    NAME: Leticia Tran  MRN: [id number]   : 1945      Patient not seen this date for Physical Therapy due to:    Patient Declined: Attempted to see pt at 1518. Pt does not agree to work with therapy despite encouragement. Will continue to follow for PT needs.      Electronically signed by Payam Parker PTA on 2025 at 3:41 PM      
Physical Therapy  [facility]   Physical Therapy Treatment  Date: 25  Patient Name: Leticia Tran       Room: 0213/0213-01  MRN: [id number]  Account: [id number]   : 1945  (79 y.o.) Gender: female     Discharge Recommendations:  Discharge Recommendations: Patient would benefit from continued therapy after discharge          General  Chart Reviewed: Yes  Response To Previous Treatment: Patient with no complaints from previous session.  Family/Caregiver Present: No  Diagnosis: dementia with behavioral disturbances    Past Medical History:  has a past medical history of Autoimmune thyroiditis, Dementia (HCC), and Osteoporosis.  Past Surgical History:   has no past surgical history on file.    Restrictions  Restrictions/Precautions  Restrictions/Precautions: General Precautions, Fall Risk, Skin (kin tear L distal forearm)  Activity Level: Up with Assist  Required Braces or Orthoses?: No  Position Activity Restriction  Other Position/Activity Restrictions: 1:1 sitter     Subjective  Subjective  Subjective: Pt is observed ambualting around common space with RW for an increased amout of time.         Pain  Pre-Pain: 0     Objective  Orientation  Overall Orientation Status: Impaired      Transfers  Transfers  Sit to Stand: Modified independent  Stand to Sit: Modified independent     Mobility      Ambulation  Surface: Level tile  Device: Rolling Walker  Assistance: Modified Independent  Quality of Gait: narrow MARIEL  Gait Deviations: Slow Amalia, Decreased step height  Distance: 200ft  Comments: No LOB observed  More Ambulation?: No          Stairs  Stairs/Curb  Stairs?: No    Assessment  Assessment  Treatment Diagnosis: impaired mobility and decresaed IND  History: Leticia Tran is a 79 y.o. female who has a past medical history of dementia, autoimmune thyroiditis, and osteoporosis. Per documentation, patient reported to the ED minimally responsive with altered mentation. Prior to ED 
Physical Therapy  DATE: 5/3/2025    NAME: Leticia Tran  MRN: [id number]   : 1945    Patient not seen this date for Physical Therapy due to:      [] Cancel by RN or physician due to:    [] Hemodialysis    [] Critical Lab Value Level     [] Blood transfusion in progress    [] Acute or unstable cardiovascular status   _MAP < 55 or more than >115  _HR < 40 or > 130    [] Acute or unstable pulmonary status   -FiO2 > 60%   _RR < 5 or >40    _O2 sats < 85%    [] Strict Bedrest    [] Off Unit for surgery or procedure    [] Off Unit for testing       [] Pending imaging to R/O fracture    [x] Refusal by Patient: Attempt at 13:55pm. Pt refusing due to just have laid down and requests to rest. Will cont to follow as able.    [] Other      [] PT being discontinued at this time. Patient independent. No further needs.     [] PT being discontinued at this time as the patient has been transferred to hospice care. No further needs.      Electronically signed by Bouchra Monet PTA on 5/3/25 at 3:03 PM EDT     
Physical Therapy  [facility]     Date: 25  Patient Name: Leticia Tran       Room: 0213/0213-01  MRN: [id number]  Account: [id number]   : 1945  (79 y.o.) Gender: female   Patient Height Height: 165.1 cm (5' 5\")  Patient Weight 71.7 kg (158 lb)      D/C PT this date due to goals achieved    
RT ASSESSMENT TREATMENT GOALS    [x]Pt Goal:  Pt will identify 1-2 positive coping skills by time of discharge.    []Pt Goal:  Pt will identify 1-2 positive aspects of self by time of discharge.    []Pt Goal:  Pt will remain on task/topic for 15-30 minutes during group by time of discharge.    []Pt Goal:  Pt will identify 1-2 aspects of relapse prevention plan by time of discharge.    []Pt Goal:  Pt will join in conversation with peers 1-2 times per group by time of discharge.    []Pt Goal:  Pt will identify 1-2 new leisure interests by time of discharge.    []Pt Goal:  Pt will not voice any delusional content by time of discharge.   
RT ASSESSMENT TREATMENT GOALS    [x]Pt Goal:  Pt will identify 1-2 positive coping skills by time of discharge.    []Pt Goal:  Pt will identify 1-2 positive aspects of self by time of discharge.    [x]Pt Goal:  Pt will remain on task/topic for 15-30 minutes during group by time of discharge.    []Pt Goal:  Pt will identify 1-2 aspects of relapse prevention plan by time of discharge.    []Pt Goal:  Pt will join in conversation with peers 1-2 times per group by time of discharge.    []Pt Goal:  Pt will identify 1-2 new leisure interests by time of discharge.    []Pt Goal:  Pt will not voice any delusional content by time of discharge.   
[facility]   Physical Therapy Evaluation  Date: 25  Patient Name: Leticia Tran       Room: 0213/0213-01  MRN: [id number]  Account: [id number]   : 1945  (79 y.o.) Gender: female     Discharge Recommendations:  Discharge Recommendations: Patient would benefit from continued therapy after discharge           Past Medical History:  has a past medical history of Autoimmune thyroiditis, Dementia (HCC), and Osteoporosis.  Past Surgical History:   has no past surgical history on file.    Subjective  Subjective  Subjective: Pt is observed standing at sink to brush teeth with nsg supervision. Pt is agreeable to work with therapy afterward. OT assists with ADLs at begining of session then pt ambulates with RW.     General  Chart Reviewed: Yes  Patient assessed for rehabilitation services?: Yes  Response To Previous Treatment: Not applicable  Family/Caregiver Present: No  Diagnosis: dementia with behavioral disturbances  Follows Commands: Impaired  General  General Comments: Nsg staff ok's PT/OT session. Pt has been ambulating around unit     Pain  Pre-Pain: 0 (pt denies)  Post-Pain: 0 (pt denies)     Social/Functional History  Social/Functional History  Lives With: Alone (Per documentation, pt's gas in home has been turned off for safety purposes d/t dementia)  Type of Home: House  Has the patient had two or more falls in the past year or any fall with injury in the past year?: No (adamantly denies)  Prior Level of Assist for ADLs:  (Pt reports she gets self dressed)  Prior Level of Assist for Ambulation: Independent household ambulator, with or without device (reports she ambulates with device sometimes, either RW vs rollator?)  Prior Level of Assist for Transfers: Independent  Additional Comments: No family/caregiver present at bedside throughout therapy eval. Obtained information above is per pt's responses. Pt is poor historian d/t 
[facility]   INPATIENT OCCUPATIONAL THERAPY  PROGRESS NOTE  Date: 2025  Patient Name: Leticia Tran       Room: 0213/0213-01  MRN: [id number]    : 1945  (79 y.o.)  Gender: female   Referring Practitioner: Miguel Angel Price MD  Diagnosis: Dementia with behavioral disturbance      Discharge Recommendations:  Further Occupational Therapy is recommended upon facility discharge.    OT Equipment Recommendations  Other: CTA    Restrictions/Precautions  Restrictions/Precautions  Restrictions/Precautions: General Precautions;Fall Risk;Skin  Activity Level: Up with Assist  Required Braces or Orthoses?: No  Position Activity Restriction  Other Position/Activity Restrictions: 1:1 sitter    O2 Device: None (Room air)    Subjective  Subjective  Subjective: pt in common area, agreeable to session with max encouragement from this writer and staff       Objective  Orientation  Overall Orientation Status: Impaired  Orientation Level: Oriented to person;Disoriented to place;Disoriented to time;Disoriented to situation  Cognition  Overall Cognitive Status: Exceptions  Arousal/Alertness: Delayed responses to stimuli  Following Commands: Inconsistently follows commands;Follows one step commands with repetition  Attention Span: Attends with cues to redirect  Memory: Impaired  Safety Judgement: Impaired  Problem Solving: Assistance required to implement solutions;Assistance required to identify errors made;Decreased awareness of errors;Assistance required to generate solutions  Insights: Decreased awareness of deficits  Initiation: Requires cues for all  Sequencing: Requires cues for all  Cognition Comment: dementia, umable to follow commands, poor carryover with education/cuing    Activities of Daily Living       Balance  Balance  Sitting Balance: Independent  Standing Balance: Supervision  Standing Balance  Time: 10 min  Activity: functional mobility/transfers in common area and room  Comment: SHAYNE 
[facility]   INPATIENT OCCUPATIONAL THERAPY  PROGRESS NOTE  Date: 2025  Patient Name: Leticia Tran       Room: 0213/0213-01  MRN: [id number]    : 1945  (79 y.o.)  Gender: female   Referring Practitioner: Miguel Angel Price MD  Diagnosis: Dementia with behavioral disturbance      Discharge Recommendations:  Further Occupational Therapy is recommended upon facility discharge.      OT Equipment Recommendations  Other: CTA    Restrictions/Precautions  Restrictions/Precautions  Restrictions/Precautions: General Precautions;Fall Risk;Skin (skin tear L distal forearm)  Activity Level: Up with Assist  Required Braces or Orthoses?: No  Position Activity Restriction  Other Position/Activity Restrictions: 1:1 sitter    O2 Device: None (Room air)    Subjective  Subjective  Subjective: \"[place]\" pt pleasantly confused but agreeable to engage in OT  Pain  Pre-Pain: 0 (pt denies)  Post-Pain: 0  Comments: Ok per Nursing for OT    Objective  Orientation  Overall Orientation Status: Impaired  Orientation Level: Oriented to person  Cognition  Overall Cognitive Status: Exceptions  Arousal/Alertness: Delayed responses to stimuli  Following Commands: Inconsistently follows commands;Follows one step commands with repetition  Attention Span: Attends with cues to redirect  Memory: Impaired  Safety Judgement: Impaired  Problem Solving: Assistance required to implement solutions;Assistance required to identify errors made;Decreased awareness of errors;Assistance required to generate solutions  Insights: Decreased awareness of deficits  Initiation: Requires cues for all  Sequencing: Requires cues for all  Cognition Comment: dementia    Activities of Daily Living  Grooming: Stand by assistance  Grooming Skilled Clinical Factors: standing at sink side for oral care and to wash face. Verbal cues for iniation of tasks. Pt attempts to place toothpaste on  finger vs toothbrush. Redirection and cues provided 
[facility]   INPATIENT OCCUPATIONAL THERAPY  PROGRESS NOTE  Date: 2025  Patient Name: Leticia Tran       Room: 0213/0213-01  MRN: [id number]    : 1945  (79 y.o.)  Gender: female   Referring Practitioner: Miguel Angel Price MD  Diagnosis: Dementia with behavioral disturbance      Discharge Recommendations:  Further Occupational Therapy is recommended upon facility discharge.    OT Equipment Recommendations  Other: CTA    Restrictions/Precautions  Restrictions/Precautions  Restrictions/Precautions: General Precautions;Fall Risk;Skin (skin tear L distal forearm)  Activity Level: Up with Assist  Required Braces or Orthoses?: No  Position Activity Restriction  Other Position/Activity Restrictions: 1:1 sitter    O2 Device: None (Room air)    Subjective  Subjective  Subjective: Patient pleasant confused throughout session. OT decreasing frequency to 2-3 times a week due to patient demonstrating with functional progress but limited due to cognition, continue a couple more sessions to further assess safety/independence with ADLs to maximize independence and reduce caregiver burden  Pain  Pre-Pain: 0  Comments: OK per RN staff for OT tx    Objective  Orientation  Overall Orientation Status: Impaired  Orientation Level: Oriented to person  Cognition  Overall Cognitive Status: Exceptions  Arousal/Alertness: Delayed responses to stimuli  Following Commands: Inconsistently follows commands;Follows one step commands with repetition  Attention Span: Attends with cues to redirect  Memory: Impaired  Safety Judgement: Impaired  Problem Solving: Assistance required to implement solutions;Assistance required to identify errors made;Decreased awareness of errors;Assistance required to generate solutions  Insights: Decreased awareness of deficits  Initiation: Requires cues for all  Sequencing: Requires cues for all  Cognition Comment: dementia    Activities of Daily Living  Grooming: Stand by 
assistance  LE Dressing Skilled Clinical Factors: Pt max A for LB dressing task seated at toilet with OROURKE providing pt with hand over hand assist to guide pt through task for threading BLE through legs of pants OROURKE facilitated LB clothing management by pt standing with RW with education on safety for alternating between supported/unsupported stands.  Toileting: Maximum assistance  Toileting Skilled Clinical Factors: pt max A for LB clothing management standing with RW .  Functional Mobility: Minimal assistance  Functional Mobility Skilled Clinical Factors: OROURKE facilitated functional mobility by pt to and from rest room with OROURKE providing tp with min A for stability and RW safety.  Product Used : Soap and water    Balance  Balance  Sitting Balance: Independent  Standing Balance: Contact guard assistance  Standing Balance  Time: 2-3 mins  Activity: functional mobility, transfers, ADLs  Comment: Fair balance with BUE support on RW    Transfers/Mobility  Bed mobility  Supine to Sit: Partial/Moderate assistance  Sit to Supine: Partial/Moderate assistance  Scooting: Partial/Moderate assistance  Bed Mobility Comments: Pt mod A for supine to sit and for scooting forward to EOB with OROURKE providing pt with education on technique and the use of DME to complete sit to EOB .  Transfers  Sit to stand: Stand by assistance  Stand to sit: Stand by assistance  Transfer Comments: SBA for safety. education and training on hand placement and safety, limited due to cogntive barriers.  Toilet Transfers  Toilet - Technique: Ambulating  Equipment Used: Standard toilet  Toilet Transfer: Stand by assistance  Toilet Transfers Comments: max verbal cueing for use of grab bar for hand placement with poor carryover noted, utilizing hand placement on RW/toilet, limited due to cognition    Functional Mobility  Functional - Mobility Device: Rolling Walker  Activity: Other;To/from bathroom  Assist Level: Minimal assistance  Functional Mobility 
contact.  Speech: Normal rate, normal volume, normal tone.  Mood:  Patient reports \"ok\".   Affect: calm, pleasant  Thought processes: loose associations, confabulations, confusion   Thought content: Denies homicidal ideation.  Suicidal Ideation: Denies suicidal ideations  Delusions: No evidence of delusions. Denies paranoia.  Perceptual Disturbance: Patient does not appear to be responding to internal stimuli. Denies auditory hallucinations. Denies visual hallucinations.   Cognition: Oriented to self,.  Memory: Impaired.  Insight & Judgement: Poor.     Data   height is 1.651 m (5' 5\") and weight is 71.7 kg (158 lb). Her temporal temperature is 97 °F (36.1 °C). Her blood pressure is 105/64 and her pulse is 73. Her respiration is 18 and oxygen saturation is 98%.   Labs:   Admission on 04/30/2025   Component Date Value Ref Range Status    Ventricular Rate 05/01/2025 56  BPM Final    QRS Duration 05/01/2025 96  ms Final    Q-T Interval 05/01/2025 362  ms Final    QTc Calculation (Bazett) 05/01/2025 349  ms Final    R Axis 05/01/2025 2  degrees Final    T Axis 05/01/2025 120  degrees Final    Sodium 05/01/2025 146 (H)  136 - 145 mmol/L Final    Potassium 05/01/2025 2.9 (LL)  3.7 - 5.3 mmol/L Final    Chloride 05/01/2025 107  98 - 107 mmol/L Final    CO2 05/01/2025 26  20 - 31 mmol/L Final    Anion Gap 05/01/2025 13  9 - 16 mmol/L Final    Glucose 05/01/2025 97  74 - 99 mg/dL Final    BUN 05/01/2025 13  8 - 23 mg/dL Final    Creatinine 05/01/2025 1.0  0.7 - 1.2 mg/dL Final    Est, Glom Filt Rate 05/01/2025 58 (L)  >60 mL/min/1.73m2 Final    Comment:       These results are not intended for use in patients <18 years of age.        eGFR results are calculated without a race factor using the 2021 CKD-EPI equation.  Careful clinical correlation is recommended, particularly when comparing to results   calculated using previous equations.  The CKD-EPI equation is less accurate in patients with extremes of muscle mass, 
safety on the unit.   Delusions: No evidence of delusions.   Perceptual Disturbance: Patient does not appear to be responding to internal stimuli.   Cognition: Oriented to self  Memory: impaired   Insight: poor   Judgement: poor     Data   height is 1.651 m (5' 5\") and weight is 71.7 kg (158 lb). Her temporal temperature is 97.7 °F (36.5 °C). Her blood pressure is 94/45 (abnormal) and her pulse is 69. Her respiration is 16 and oxygen saturation is 98%.   Labs:   Admission on 04/30/2025   Component Date Value Ref Range Status    Ventricular Rate 05/01/2025 56  BPM Final    QRS Duration 05/01/2025 96  ms Final    Q-T Interval 05/01/2025 362  ms Final    QTc Calculation (Bazett) 05/01/2025 349  ms Final    R Axis 05/01/2025 2  degrees Final    T Axis 05/01/2025 120  degrees Final    Sodium 05/01/2025 146 (H)  136 - 145 mmol/L Final    Potassium 05/01/2025 2.9 (LL)  3.7 - 5.3 mmol/L Final    Chloride 05/01/2025 107  98 - 107 mmol/L Final    CO2 05/01/2025 26  20 - 31 mmol/L Final    Anion Gap 05/01/2025 13  9 - 16 mmol/L Final    Glucose 05/01/2025 97  74 - 99 mg/dL Final    BUN 05/01/2025 13  8 - 23 mg/dL Final    Creatinine 05/01/2025 1.0  0.7 - 1.2 mg/dL Final    Est, Glom Filt Rate 05/01/2025 58 (L)  >60 mL/min/1.73m2 Final    Comment:       These results are not intended for use in patients <18 years of age.        eGFR results are calculated without a race factor using the 2021 CKD-EPI equation.  Careful clinical correlation is recommended, particularly when comparing to results   calculated using previous equations.  The CKD-EPI equation is less accurate in patients with extremes of muscle mass, extra-renal   metabolism of creatine, excessive creatine ingestion, or following therapy that affects   renal tubular secretion.      Calcium 05/01/2025 9.2  8.6 - 10.4 mg/dL Final    TSH 05/01/2025 222.00 (H)  0.27 - 4.20 uIU/mL Final    T4 Free 05/01/2025 0.3 (L)  0.9 - 1.7 ng/dL Final    Sodium 05/02/2025 146 (H)  136 
0.27 - 4.20 uIU/mL Final    T4 Free 05/01/2025 0.3 (L)  0.9 - 1.7 ng/dL Final    Sodium 05/02/2025 146 (H)  136 - 145 mmol/L Final    Potassium 05/02/2025 3.4 (L)  3.7 - 5.3 mmol/L Final    Chloride 05/02/2025 108 (H)  98 - 107 mmol/L Final    CO2 05/02/2025 29  20 - 31 mmol/L Final    Anion Gap 05/02/2025 9  9 - 16 mmol/L Final    Glucose 05/02/2025 134 (H)  74 - 99 mg/dL Final    BUN 05/02/2025 25 (H)  8 - 23 mg/dL Final    Creatinine 05/02/2025 1.3 (H)  0.7 - 1.2 mg/dL Final    Est, Glom Filt Rate 05/02/2025 42 (L)  >60 mL/min/1.73m2 Final    Comment:       These results are not intended for use in patients <18 years of age.        eGFR results are calculated without a race factor using the 2021 CKD-EPI equation.  Careful clinical correlation is recommended, particularly when comparing to results   calculated using previous equations.  The CKD-EPI equation is less accurate in patients with extremes of muscle mass, extra-renal   metabolism of creatine, excessive creatine ingestion, or following therapy that affects   renal tubular secretion.      Calcium 05/02/2025 9.5  8.6 - 10.4 mg/dL Final    Sodium 05/04/2025 146 (H)  136 - 145 mmol/L Final    Potassium 05/04/2025 3.9  3.7 - 5.3 mmol/L Final    Chloride 05/04/2025 109 (H)  98 - 107 mmol/L Final    CO2 05/04/2025 28  20 - 31 mmol/L Final    Anion Gap 05/04/2025 9  9 - 16 mmol/L Final    Glucose 05/04/2025 110 (H)  74 - 99 mg/dL Final    BUN 05/04/2025 23  8 - 23 mg/dL Final    Creatinine 05/04/2025 0.9  0.7 - 1.2 mg/dL Final    Est, Glom Filt Rate 05/04/2025 65  >60 mL/min/1.73m2 Final    Comment:       These results are not intended for use in patients <18 years of age.        eGFR results are calculated without a race factor using the 2021 CKD-EPI equation.  Careful clinical correlation is recommended, particularly when comparing to results   calculated using previous equations.  The CKD-EPI equation is less accurate in patients with extremes of muscle mass, 
Appearance:  alert, well appearing, and in no acute distress, center obesity present  Mental status: oriented to person, place, and time with normal affect  Head:  normocephalic, atraumatic.  Eye: no icterus, redness, pupils equal and reactive, extraocular eye movements intact, conjunctiva clear  Ear: normal external ear, no discharge, hearing intact  Nose:  no drainage noted  Mouth: mucous membranes moist  Neck: supple, no carotid bruits, thyroid not palpable  Lungs: Bilateral equal air entry, clear to ausculation, no wheezing, rales or rhonchi, normal effort  Cardiovascular: normal rate, regular rhythm, no murmur, gallop, rub.  Abdomen: Soft, nontender, nondistended, normal bowel sounds, no hepatomegaly or splenomegaly, has Ovlera's catheter  Neurologic: There are no new focal motor or sensory deficits, normal muscle tone and bulk, no abnormal sensation, normal speech, cranial nerves II through XII grossly intact  Skin: No gross lesions, rashes, bruising or bleeding on exposed skin area  Extremities:  peripheral pulses palpable, no pedal edema or calf pain with palpation  Psych:     Investigations:      Laboratory Testing:  Recent Results (from the past 24 hours)   Iron and TIBC    Collection Time: 05/17/25  8:32 AM   Result Value Ref Range    Iron 55 37 - 145 ug/dL    TIBC 287 250 - 450 ug/dL    Iron % Saturation 19 (L) 20 - 55 %    UIBC 232 112 - 347 ug/dL   Ferritin    Collection Time: 05/17/25  8:32 AM   Result Value Ref Range    Ferritin 167 ng/mL             Consultations:   IP CONSULT TO INTERNAL MEDICINE    Assessment :      Primary Problem  Dementia with behavioral disturbance (HCC)    Active Hospital Problems    Diagnosis Date Noted    Myxedema [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 
Rate 05/02/2025 42 (L)  >60 mL/min/1.73m2 Final    Comment:       These results are not intended for use in patients <18 years of age.        eGFR results are calculated without a race factor using the 2021 CKD-EPI equation.  Careful clinical correlation is recommended, particularly when comparing to results   calculated using previous equations.  The CKD-EPI equation is less accurate in patients with extremes of muscle mass, extra-renal   metabolism of creatine, excessive creatine ingestion, or following therapy that affects   renal tubular secretion.      Calcium 05/02/2025 9.5  8.6 - 10.4 mg/dL Final    Sodium 05/04/2025 146 (H)  136 - 145 mmol/L Final    Potassium 05/04/2025 3.9  3.7 - 5.3 mmol/L Final    Chloride 05/04/2025 109 (H)  98 - 107 mmol/L Final    CO2 05/04/2025 28  20 - 31 mmol/L Final    Anion Gap 05/04/2025 9  9 - 16 mmol/L Final    Glucose 05/04/2025 110 (H)  74 - 99 mg/dL Final    BUN 05/04/2025 23  8 - 23 mg/dL Final    Creatinine 05/04/2025 0.9  0.7 - 1.2 mg/dL Final    Est, Glom Filt Rate 05/04/2025 65  >60 mL/min/1.73m2 Final    Comment:       These results are not intended for use in patients <18 years of age.        eGFR results are calculated without a race factor using the 2021 CKD-EPI equation.  Careful clinical correlation is recommended, particularly when comparing to results   calculated using previous equations.  The CKD-EPI equation is less accurate in patients with extremes of muscle mass, extra-renal   metabolism of creatine, excessive creatine ingestion, or following therapy that affects   renal tubular secretion.      Calcium 05/04/2025 8.9  8.6 - 10.4 mg/dL Final    Sodium 05/06/2025 141  136 - 145 mmol/L Final    Potassium 05/06/2025 4.2  3.7 - 5.3 mmol/L Final    Chloride 05/06/2025 101  98 - 107 mmol/L Final    CO2 05/06/2025 31  20 - 31 mmol/L Final    Anion Gap 05/06/2025 9  9 - 16 mmol/L Final    Glucose 05/06/2025 98  74 - 99 mg/dL Final    BUN 05/06/2025 25 (H)  8 - 23 
activity for improved participation  Short Term Goal 5: Pt will actively participate in 15+ mins of therapeutic exercise/functional activity for improved safety and independence with self-care  Long Term Goals  Long Term Goal 1: Pt/caregiver to be I with fall prevention edu, EC/WS tech, cognitive/memory stratigies, pressure relief/skin integrity, recommendations for discharge/AE with use of handouts as needed.  Occupational Therapy Plan  Times Per Week: DC at this time from OT services  Current Treatment Recommendations: Strengthening, Balance training, Endurance training, Safety education & training, Self-Care / ADL, Equipment evaluation, education, & procurement, Patient/Caregiver education & training, Cognitive/Perceptual training, Cognitive reorientation    Assessment  Activity Tolerance  Activity Tolerance: Treatment limited secondary to decreased cognition  Activity Tolerance Comments: unable to follow commands  Assessment  Performance deficits / Impairments: Decreased ADL status, Decreased functional mobility , Decreased safe awareness, Decreased cognition, Decreased balance, Decreased posture, Decreased strength, Decreased fine motor control, Decreased endurance  Assessment: DC from OT services at this time. Pt is at baseline with cognitive barriers. Pt will require 24/7 supervision at discharge for safety with mobility and self-care. No other services indicated due to baseline function of cognition at this time.  Treatment Diagnosis: impaired self-care status  Prognosis: Fair  Decision Making: Medium Complexity  Discharge Recommendations: Patient would benefit from continued therapy after discharge  OT Equipment Recommendations  Other: CTA  Safety Devices  Type of Devices: Sitter present, Left in chair (In common area with 1:1)    AM-PAC Daily Activities Inpatient  AM-PAC Daily Activity - Inpatient   How much help is needed for putting on and taking off regular lower body clothing?: A Lot  How much help is 
acute distress, center obesity present  Mental status: oriented to person, place, and time with normal affect  Head:  normocephalic, atraumatic.  Eye: no icterus, redness, pupils equal and reactive, extraocular eye movements intact, conjunctiva clear  Ear: normal external ear, no discharge, hearing intact  Nose:  no drainage noted  Mouth: mucous membranes moist  Neck: supple, no carotid bruits, thyroid not palpable  Lungs: Bilateral equal air entry, clear to ausculation, no wheezing, rales or rhonchi, normal effort  Cardiovascular: normal rate, regular rhythm, no murmur, gallop, rub.  Abdomen: Soft, nontender, nondistended, normal bowel sounds, no hepatomegaly or splenomegaly, has Olvera's catheter  Neurologic: There are no new focal motor or sensory deficits, normal muscle tone and bulk, no abnormal sensation, normal speech, cranial nerves II through XII grossly intact  Skin: No gross lesions, rashes, bruising or bleeding on exposed skin area  Extremities:  peripheral pulses palpable, no pedal edema or calf pain with palpation  Psych:     Investigations:      Laboratory Testing:  No results found for this or any previous visit (from the past 24 hours).          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 Synthroid, TSH has reduced to 222 from more than 300  Bradycardia. improved  Vitamin B12 deficiency, vitamin B12 tested recently, low, getting replaced  Patient mobile 
are calculated without a race factor using the 2021 CKD-EPI equation.  Careful clinical correlation is recommended, particularly when comparing to results   calculated using previous equations.  The CKD-EPI equation is less accurate in patients with extremes of muscle mass, extra-renal   metabolism of creatine, excessive creatine ingestion, or following therapy that affects   renal tubular secretion.      Calcium 05/02/2025 9.5  8.6 - 10.4 mg/dL Final         Reviewed patient's current plan of care and vital signs with nursing staff.    Labs reviewed: [x] Yes  Vitals reviewed: [x] Yes    Medications  Current Facility-Administered Medications: potassium chloride (KLOR-CON M) extended release tablet 40 mEq, 40 mEq, Oral, BID WC  donepezil (ARICEPT) tablet 10 mg, 10 mg, Oral, Nightly  levothyroxine (SYNTHROID) tablet 137 mcg, 137 mcg, Oral, Daily  vitamin B-12 (CYANOCOBALAMIN) tablet 1,000 mcg, 1,000 mcg, Oral, Daily  melatonin tablet 1.5 mg, 1.5 mg, Oral, Nightly PRN  Vitamin D (CHOLECALCIFEROL) tablet 1,000 Units, 1,000 Units, Oral, Daily  acetaminophen (TYLENOL) tablet 650 mg, 650 mg, Oral, Q6H PRN  ibuprofen (ADVIL;MOTRIN) tablet 400 mg, 400 mg, Oral, Q6H PRN  hydrOXYzine HCl (ATARAX) tablet 50 mg, 50 mg, Oral, TID PRN  traZODone (DESYREL) tablet 50 mg, 50 mg, Oral, Nightly PRN  polyethylene glycol (GLYCOLAX) packet 17 g, 17 g, Oral, Daily PRN  aluminum & magnesium hydroxide-simethicone (MAALOX PLUS) 200-200-20 MG/5ML suspension 30 mL, 30 mL, Oral, Q6H PRN  nicotine polacrilex (COMMIT) lozenge 2 mg, 2 mg, Oral, Q2H PRN  haloperidol lactate (HALDOL) injection 5 mg, 5 mg, IntraMUSCular, Q6H PRN **AND** LORazepam (ATIVAN) injection 2 mg, 2 mg, IntraMUSCular, Q6H PRN **AND** diphenhydrAMINE (BENADRYL) injection 50 mg, 50 mg, IntraMUSCular, Q6H PRN  haloperidol (HALDOL) tablet 5 mg, 5 mg, Oral, Q6H PRN **AND** LORazepam (ATIVAN) tablet 2 mg, 2 mg, Oral, Q6H PRN    ASSESSMENT  Dementia with behavioral disturbance 
recently, low, getting replaced  Patient mobile no indication for DVT prophylaxis  PT/OT consult  Hypokalemia, potassium improved.  Elevated creatinine -improved on recent labs  Olvera's catheter is removed, having good urine output, will DC bladder scan  Patient is DNR CC  Overall prognosis very guarded      Intermittently hypotensive, otherwise asymptomatic,  TSH improved, now 82.6 on 5/13  Was 300 at the time of presentation  Macrocytosis, vitamin D B12 low getting replaced  Iron studies reviewed    Lula Brito MD  5/18/2025  2:51 PM      
Daily  acetaminophen (TYLENOL) tablet 650 mg, 650 mg, Oral, Q6H PRN  ibuprofen (ADVIL;MOTRIN) tablet 400 mg, 400 mg, Oral, Q6H PRN  traZODone (DESYREL) tablet 50 mg, 50 mg, Oral, Nightly PRN  polyethylene glycol (GLYCOLAX) packet 17 g, 17 g, Oral, Daily PRN  aluminum & magnesium hydroxide-simethicone (MAALOX PLUS) 200-200-20 MG/5ML suspension 30 mL, 30 mL, Oral, Q6H PRN  nicotine polacrilex (COMMIT) lozenge 2 mg, 2 mg, Oral, Q2H PRN  haloperidol lactate (HALDOL) injection 5 mg, 5 mg, IntraMUSCular, Q6H PRN **AND** LORazepam (ATIVAN) injection 2 mg, 2 mg, IntraMUSCular, Q6H PRN **AND** diphenhydrAMINE (BENADRYL) injection 50 mg, 50 mg, IntraMUSCular, Q6H PRN  haloperidol (HALDOL) tablet 5 mg, 5 mg, Oral, Q6H PRN **AND** LORazepam (ATIVAN) tablet 2 mg, 2 mg, Oral, Q6H PRN    ASSESSMENT  Dementia with behavioral disturbance (HCC)         Plan will be as follows:  Patient was seen this morning.  Patient was sitting in front of the food was unable to eat by herself without encouragement.  Patient has been denied by insurance for nursing home placement.  Patient will need discharge planning.   will coordinate with the family.  We will continue with the current medication with no change  PLAN  Patient s symptoms   show no change  Attempt to develop insight  Psycho-education conducted.  Supportive Therapy conducted.  Probable discharge is tomorrow home with family member  Follow-up daily while on inpatient unit    Time Spent on patient care is more than 35 minutes in the examination, evaluation, counseling and review of medications.     Electronically signed by Miguel Angel Price MD on 5/15/25 at 7:27 PM EDT

## 2025-05-20 NOTE — TRANSITION OF CARE
Behavioral Health Transition Record    Patient Name: Leticia Tran  YOB: 1945   Medical Record Number: [id number]  Date of Admission: 4/30/2025  5:57 PM   Date of Discharge: 5/20/25    Attending Provider: Miguel Angel Price MD   Discharging Provider: Dr. Price  To contact this individual call [phone] and ask the  to page.  If unavailable, ask to be transferred to Behavioral Health Provider on call.  A Behavioral Health Provider will be available on call 24/7 and during holidays.    Primary Care Provider: Neema Hsieh MD    Allergies   Allergen Reactions    Latex      Band Aid    Tetanus Toxoid      seizures       Reason for Admission: Reason for Admission to Psychiatric Unit:  Threat to self requiring 24 hour professional observation  Threat to others requiring 24 hour professional observation  Acute disordered/bizarre behavior or psychomotor agitation or retardation;interferes with ADLs so that patient cannot function at a less intensive care level of care during evaluation and treatment   Patient has a dementing disorder and is admitted for eval or treatment of a psychiatric comorbidity (i.e. risk of suicide, violence, severe depression) warranting inpatient admission   A mental disorder causing major disability in social, interpersonal, occupational, and/or educational functioning that is leading to dangerous or life-threatening functioning, and that can only be addressed in an acute inpatient setting   A mental disorder that causes an inability to maintain adequate nurtrition or self-care, and family/community support cannot provide reliable, essential care, so that the patient cannont function at a less intensive level of care during evaluation and treatment   Concerns about patient's safety in the community  Past Mental Health Diagnosis: a history of  cognitive impairment   Triggering event or precipitating factor: Psych Treatment Noncompliance  Length of time/duration of

## 2025-05-20 NOTE — CARE COORDINATION
Name: Leticia Tran    : 1945    Auth number: [id number]     Discharge Date: 25    Destination: [facility].     Discharge Medications:      Medication List        START taking these medications      traZODone 50 MG tablet  Commonly known as: DESYREL  Take 1 tablet by mouth nightly as needed for Sleep  Notes to patient: Sleep aid            CHANGE how you take these medications      donepezil 10 MG tablet  Commonly known as: ARICEPT  Take 1 tablet by mouth nightly  What changed: medication strength  Notes to patient: Confusion/ memory loss     levothyroxine 137 MCG tablet  Commonly known as: SYNTHROID  Take 1 tablet by mouth Daily  What changed: when to take this  Notes to patient: Thyroid function     melatonin 3 MG Tabs tablet  Take 0.5 tablets by mouth nightly as needed (sleep)  What changed:   medication strength  how much to take  reasons to take this  Notes to patient: Sleep aid            CONTINUE taking these medications      vitamin B-12 1000 MCG tablet  Commonly known as: CYANOCOBALAMIN  Take 1 tablet by mouth daily  Notes to patient: supplement     vitamin D3 25 MCG (1000 UT) Tabs tablet  Commonly known as: CHOLECALCIFEROL  Take 1 tablet by mouth daily  Notes to patient: supplement            STOP taking these medications      QUEtiapine 50 MG tablet  Commonly known as: SEROQUEL  Notes to patient: mood               Where to Get Your Medications        These medications were sent to [id number] - [address] -  [phone] - F [phone]  [address] [place], [place] [address]      Phone: [phone]   donepezil 10 MG tablet  levothyroxine 137 MCG tablet  melatonin 3 MG Tabs tablet  traZODone 50 MG tablet  vitamin B-12 1000 MCG tablet  vitamin D3 25 MCG (1000 UT) Tabs tablet         Follow Up Appointment: [facility]  [address]  [place] [id number] [phone]  Go on 2025  [facility]

## 2025-05-20 NOTE — BH NOTE
[facility]  Discharge Note    Pt discharged with followings belongings:   Dental Appliances: None  Vision - Corrective Lenses: None  Hearing Aid: None  Jewelry: None  Body Piercings Removed: N/A  Clothing: Shirt, Pants  Other Valuables: Other (Comment) (Wipes, hand therapy foam blocks)   Valuables sent home with or returned to patient. Patient educated on aftercare instructions: Yes  Information faxed to [facility] by staff  at 12:55 PM .Patient verbalize understanding of AVS:  No.    Status EXAM upon discharge:  Mental Status and Behavioral Exam  Normal: No  Level of Assistance: Partial assist  Facial Expression: Flat  Affect: Appropriate  Level of Consciousness: Confused  Frequency of Checks: 1 staff: 1 patient  - continuous  Mood:Normal: No  Mood: Other (comment) (pleasantly confused)  Motor Activity:Normal: No  Motor Activity: Unusual posture/gait  Eye Contact: Fair  Observed Behavior: Cooperative, Friendly  Sexual Misconduct History: Current - no  Preception: [place] to person  Attention:Normal: No  Attention: Distractible, Unable to concentrate  Thought Processes: Loose association  Thought Content:Normal: No  Thought Content: Preoccupations  Depression Symptoms: No problems reported or observed.  Anxiety Symptoms: Generalized  [place] Symptoms: No problems reported or observed.  Hallucinations: None  Delusions: No  Delusions: Other (comment) (n/a)  Memory:Normal: No  Memory: Poor recent, Poor remote  Insight and Judgment: No  Insight and Judgment: Poor judgment, Poor insight    Tobacco Screening:  Practical Counseling, on admission, jose guadalupe X, if applicable and completed (first 3 are required if patient doesn't refuse):            ( ) Recognizing danger situations (included triggers and roadblocks)                    ( ) Coping skills (new ways to manage stress,relaxation techniques, changing routine, distraction)                                                           (

## 2025-05-20 NOTE — BH NOTE
Handoff report called into Martha PAPPAS nursing staff @ [facility] [place].

## 2025-05-20 NOTE — PLAN OF CARE
Problem: Behavior  Goal: Pt/Family maintain appropriate behavior and adhere to behavioral management agreement, if implemented  Description: INTERVENTIONS:1. Assess patient/family's coping skills and  non-compliant behavior (including use of illegal substances)2. Notify security of behavior or suspected illegal substances which indicate the need for search of the family and/or belongings3. Encourage verbalization of thoughts and concerns in a socially appropriate manner4. Utilize positive, consistent limit setting strategies supporting safety of patient, staff and others5. Encourage participation in the decision making process about the behavioral management agreement6. If a visitor's behavior poses a threat to safety call refer to organization policy.7. Initiate consult with , Psychosocial CNS, Spiritual Care as appropriate  5/10/2025 0211 by Elena Cortes LPN  Note: Patient is alert and oriented to self only. Patient requires frequent redirection and cuing with ADLs. Patient has been compliant with medications this shift. Patient is observed social with staff and peers. Patient denies any depression or anxiety at this time. Patient denies any ideas of harm to herself. Patient  has remained in behavioral control with no outbursts observed this shift.     Problem: Safety - Adult  Goal: Free from fall injury  5/10/2025 0211 by Elena Cortes LPN  Note: Patient is free from falls at this time. Patient has non skid socks, a walker and 1:1 staff for safety.     Problem: Nutrition Deficit:  Goal: Optimize nutritional status  5/10/2025 0211 by Elena Cortes LPN  Note: Patient is accepting of snacks and fluids this shift.     Problem: Skin/Tissue Integrity  Goal: Skin integrity remains intact  Description: 1.  Monitor for areas of redness and/or skin breakdown2.  Assess vascular access sites hourly3.  Every 4-6 hours minimum:  Change oxygen saturation probe site4.  Every 4-6 
  Problem: Behavior  Goal: Pt/Family maintain appropriate behavior and adhere to behavioral management agreement, if implemented  Description: INTERVENTIONS:1. Assess patient/family's coping skills and  non-compliant behavior (including use of illegal substances)2. Notify security of behavior or suspected illegal substances which indicate the need for search of the family and/or belongings3. Encourage verbalization of thoughts and concerns in a socially appropriate manner4. Utilize positive, consistent limit setting strategies supporting safety of patient, staff and others5. Encourage participation in the decision making process about the behavioral management agreement6. If a visitor's behavior poses a threat to safety call refer to organization policy.7. Initiate consult with , Psychosocial CNS, Spiritual Care as appropriate  Note: Patient denies any thoughts of harm to self or other, denies hallucination, depression and anxiety. Patient is alert and oriented to self only, reorientation provided. Patient is forgetful and has poor safety awareness. Patient has been compliant with meds and behavior is controlled. Patient's appetite is good eating between % of meals in dayroom. Patient is friendly and social with peers in dayroom. 1:1 continued for patient safety/fall risk.      Problem: Safety - Adult  Goal: Free from fall injury  Note: Patient remains free of falls at this time. Patient is forgetful and not aware of her safety risks despite frequent reminders. Patient does get easily irritated when intervening for safety however is otherwise pleasant. Patient is wearing non skid footwear and uses walker to ambulate. 1:1 sitter for safety/fall risk continued.          Problem: Skin/Tissue Integrity  Goal: Skin integrity remains intact  Description: 1.  Monitor for areas of redness and/or skin breakdown2.  Assess vascular access sites hourly3.  Every 4-6 hours minimum:  Change oxygen saturation 
  Problem: Behavior  Goal: Pt/Family maintain appropriate behavior and adhere to behavioral management agreement, if implemented  Description: INTERVENTIONS:1. Assess patient/family's coping skills and  non-compliant behavior (including use of illegal substances)2. Notify security of behavior or suspected illegal substances which indicate the need for search of the family and/or belongings3. Encourage verbalization of thoughts and concerns in a socially appropriate manner4. Utilize positive, consistent limit setting strategies supporting safety of patient, staff and others5. Encourage participation in the decision making process about the behavioral management agreement6. If a visitor's behavior poses a threat to safety call refer to organization policy.7. Initiate consult with , Psychosocial CNS, Spiritual Care as appropriate  Note: Patient is alert and oriented to self with much confusion noted. Patient has been cooperative with care and medications this shift. Patient required frequent redirection but she has been pleasant with no behavioral outbursts observed.     Problem: Safety - Adult  Goal: Free from fall injury  Note: Patient is free from falls at this time. Patient has non skid socks, a walker and 1:1 staff for safety.     Problem: Risk for Elopement  Goal: Patient will not exit the unit/facility without proper excort  Note: No exit seeking behaviors observed this shift.     Problem: Nutrition Deficit:  Goal: Optimize nutritional status  Note: Staff continues to encourage fluids and assist with meals and snacks throughout the day. Patient was accepting of a snacks and fluids this shift.      Problem: Skin/Tissue Integrity  Goal: Skin integrity remains intact  Description: 1.  Monitor for areas of redness and/or skin breakdown2.  Assess vascular access sites hourly3.  Every 4-6 hours minimum:  Change oxygen saturation probe site4.  Every 4-6 hours:  If on nasal continuous positive airway 
  Problem: Behavior  Goal: Pt/Family maintain appropriate behavior and adhere to behavioral management agreement, if implemented  Description: INTERVENTIONS:1. Assess patient/family's coping skills and  non-compliant behavior (including use of illegal substances)2. Notify security of behavior or suspected illegal substances which indicate the need for search of the family and/or belongings3. Encourage verbalization of thoughts and concerns in a socially appropriate manner4. Utilize positive, consistent limit setting strategies supporting safety of patient, staff and others5. Encourage participation in the decision making process about the behavioral management agreement6. If a visitor's behavior poses a threat to safety call refer to organization policy.7. Initiate consult with , Psychosocial CNS, Spiritual Care as appropriate  Outcome: Progressing     Problem: Safety - Adult  Goal: Free from fall injury  Outcome: Progressing     Problem: Chronic Conditions and Co-morbidities  Goal: Patient's chronic conditions and co-morbidity symptoms are monitored and maintained or improved  Outcome: Progressing     Problem: Nutrition Deficit:  Goal: Optimize nutritional status  Outcome: Progressing     Note: Patient denies suicidal ideation, homicidal ideation and hallucinations at this time.   
  Problem: Chronic Conditions and Co-morbidities  Goal: Patient's chronic conditions and co-morbidity symptoms are monitored and maintained or improved  5/12/2025 1231 by Dg Antonio RN  Outcome: Not Progressing     
  Problem: Chronic Conditions and Co-morbidities  Goal: Patient's chronic conditions and co-morbidity symptoms are monitored and maintained or improved  Outcome: Not Progressing   NOTE: 1:1 with patient for ten minutes.  Patient encouraged to attend unit programming and interact with peers and staff.  Patient also encouraged to tend to hygiene and ADLs.  Patient encouraged to discuss feelings with staff and feedback and reassurance provided.  Patient denies thoughts of self-harm and is agreeable to seeking staff out should thoughts of self-harm arise.  Safe environment maintained.  Every 15-minute checks for safety continues per unit policy.  Will continue to monitor for safety and provide support and reassurance as needed.  Patient is controlled in behaviors. Patient is compliant with medications.   
  Problem: Chronic Conditions and Co-morbidities  Goal: Patient's chronic conditions and co-morbidity symptoms are monitored and maintained or improved  Outcome: Not Progressing   NOTE: 1:1 with patient for ten minutes.  Patient encouraged to attend unit programming and interact with peers and staff.  Patient also encouraged to tend to hygiene and ADLs.  Patient encouraged to discuss feelings with staff and feedback and reassurance provided.  Patient denies thoughts of self-harm and is agreeable to seeking staff out should thoughts of self-harm arise.  Safe environment maintained.  Every 15-minute checks for safety continues per unit policy.  Will continue to monitor for safety and provide support and reassurance as needed.  Patient is controlled in behaviors. Patient is compliant with medications.   
  Problem: Depression  Goal: Will be euthymic at discharge  Description: INTERVENTIONS:1. Administer medication as ordered2. Provide emotional support via 1:1 interaction with staff3. Encourage involvement in milieu/groups/activities4. Monitor for social isolation  Outcome: Progressing     Problem: Anxiety  Goal: Will report anxiety at manageable levels  Description: INTERVENTIONS:1. Administer medication as ordered2. Teach and rehearse alternative coping skills3. Provide emotional support with 1:1 interaction with staff  Outcome: Progressing     Problem: Self Harm/Suicidality  Goal: Will have no self-injury during hospital stay  Description: INTERVENTIONS:1.  Ensure constant observer at bedside with Q15M safety checks2.  Maintain a safe environment3.  Secure patient belongings4.  Ensure family/visitors adhere to safety recommendations5.  Ensure safety tray has been added to patient's diet order6.  Every shift and PRN: Re-assess suicidal risk via Frequent Screener  Note: Patient is alert to self only, presenting much difficulty expressing her thoughts and feelings. Cognitive deficits are noted during communication due to patients inability to stay focused or finish a complete thought. Patient has been pleasant, able to follow directions and compliant with medications and treatment regimes. She exhibits no unusual behaviors 1:1 constant observer continues as well as, Q15 minute safety checks.     
  Problem: Involuntary Admit  Goal: Will cooperate with staff recommendations and doctor's orders and will demonstrate appropriate behavior  Description: INTERVENTIONS:1. Treat underlying conditions and offer medication as ordered2. Educate regarding involuntary admission procedures and rules3. Contain excessive/inappropriate behavior per unit and hospital policies  5/3/2025 2307 by Wade Salinas RN  Outcome: Progressing  Patient is cooperative with staff.     Problem: Safety - Adult  Goal: Free from fall injury  5/3/2025 2307 by Wade Salinas RN  Outcome: Progressing  Patient remains free from falls.     Problem: Chronic Conditions and Co-morbidities  Goal: Patient's chronic conditions and co-morbidity symptoms are monitored and maintained or improved  5/3/2025 2307 by Wade Salinas RN  Outcome: Progressing  Patient's chronic conditions are maintained.     Problem: Risk for Elopement  Goal: Patient will not exit the unit/facility without proper excort  5/3/2025 2307 by Wade Salinas RN  Outcome: Progressing  Patient displays no elopement behaviors at this time.       Problem: Behavior  Goal: Pt/Family maintain appropriate behavior and adhere to behavioral management agreement, if implemented  Description: INTERVENTIONS:1. Assess patient/family's coping skills and  non-compliant behavior (including use of illegal substances)2. Notify security of behavior or suspected illegal substances which indicate the need for search of the family and/or belongings3. Encourage verbalization of thoughts and concerns in a socially appropriate manner4. Utilize positive, consistent limit setting strategies supporting safety of patient, staff and others5. Encourage participation in the decision making process about the behavioral management agreement6. If a visitor's behavior poses a threat to safety call refer to organization policy.7. Initiate consult with , Psychosocial CNS, Spiritual Care as appropriate  5/3/2025 2307 
  Problem: Involuntary Admit  Goal: Will cooperate with staff recommendations and doctor's orders and will demonstrate appropriate behavior  Description: INTERVENTIONS:1. Treat underlying conditions and offer medication as ordered2. Educate regarding involuntary admission procedures and rules3. Contain excessive/inappropriate behavior per unit and hospital policies  Outcome: Progressing     Problem: Safety - Adult  Goal: Free from fall injury  Outcome: Progressing     Problem: Chronic Conditions and Co-morbidities  Goal: Patient's chronic conditions and co-morbidity symptoms are monitored and maintained or improved  Outcome: Progressing     Problem: Risk for Elopement  Goal: Patient will not exit the unit/facility without proper excort  Outcome: Progressing     Problem: Behavior  Goal: Pt/Family maintain appropriate behavior and adhere to behavioral management agreement, if implemented  Description: INTERVENTIONS:1. Assess patient/family's coping skills and  non-compliant behavior (including use of illegal substances)2. Notify security of behavior or suspected illegal substances which indicate the need for search of the family and/or belongings3. Encourage verbalization of thoughts and concerns in a socially appropriate manner4. Utilize positive, consistent limit setting strategies supporting safety of patient, staff and others5. Encourage participation in the decision making process about the behavioral management agreement6. If a visitor's behavior poses a threat to safety call refer to organization policy.7. Initiate consult with , Psychosocial CNS, Spiritual Care as appropriate  Outcome: Progressing     Problem: Nutrition Deficit:  Goal: Optimize nutritional status  Outcome: Progressing     Problem: Skin/Tissue Integrity  Goal: Skin integrity remains intact  Description: 1.  Monitor for areas of redness and/or skin breakdown2.  Assess vascular access sites hourly3.  Every 4-6 hours minimum:  Change 
  Problem: Involuntary Admit  Goal: Will cooperate with staff recommendations and doctor's orders and will demonstrate appropriate behavior  Description: INTERVENTIONS:1. Treat underlying conditions and offer medication as ordered2. Educate regarding involuntary admission procedures and rules3. Contain excessive/inappropriate behavior per unit and hospital policies  Outcome: Progressing     Problem: Safety - Adult  Goal: Free from fall injury  Outcome: Progressing     Problem: Risk for Elopement  Goal: Patient will not exit the unit/facility without proper excort  Outcome: Progressing   NOTE: 1:1 with patient for ten minutes.  Patient encouraged to attend unit programming and interact with peers and staff.  Patient also encouraged to tend to hygiene and ADLs.  Patient encouraged to discuss feelings with staff and feedback and reassurance provided.  Patient denies thoughts of self-harm and is agreeable to seeking staff out should thoughts of self-harm arise.  Safe environment maintained.  Every 15-minute checks for safety continues per unit policy.  Will continue to monitor for safety and provide support and reassurance as needed.  Patient is controlled in behaviors. Patient is compliant with medications.     
  Problem: Risk for Elopement  Goal: Patient will not exit the unit/facility without proper excort  5/2/2025 2225 by Maria Esther Lopez, RN  Outcome: Progressing  Note: Patient is absent of exit seeking behaviors. Patient remains free from doors and exits. Staff maintains Q15 minute safety checks.      Problem: Behavior  Goal: Pt/Family maintain appropriate behavior and adhere to behavioral management agreement, if implemented  Description: INTERVENTIONS:1. Assess patient/family's coping skills and  non-compliant behavior (including use of illegal substances)2. Notify security of behavior or suspected illegal substances which indicate the need for search of the family and/or belongings3. Encourage verbalization of thoughts and concerns in a socially appropriate manner4. Utilize positive, consistent limit setting strategies supporting safety of patient, staff and others5. Encourage participation in the decision making process about the behavioral management agreement6. If a visitor's behavior poses a threat to safety call refer to organization policy.7. Initiate consult with , Psychosocial CNS, Spiritual Care as appropriate  5/2/2025 2225 by Maria Esther Lopez, RN  Outcome: Progressing  Note: Patient remains behavioral compliant at this time. Patient encouraged to notify staff if in emotional distress. Staff ensures safety by providing safety checks on the unit intermittently and every 15 minutes. Staff continues to provide a safe environment.      
  Problem: Risk for Elopement  Goal: Patient will not exit the unit/facility without proper excort  5/5/2025 0017 by Maria Esther Lopez, RN  Outcome: Progressing  Note: Patient is absent of exit seeking behaviors. Patient remains free from doors and exits. Staff maintains Q15 minute safety checks.      Problem: Behavior  Goal: Pt/Family maintain appropriate behavior and adhere to behavioral management agreement, if implemented  Description: INTERVENTIONS:1. Assess patient/family's coping skills and  non-compliant behavior (including use of illegal substances)2. Notify security of behavior or suspected illegal substances which indicate the need for search of the family and/or belongings3. Encourage verbalization of thoughts and concerns in a socially appropriate manner4. Utilize positive, consistent limit setting strategies supporting safety of patient, staff and others5. Encourage participation in the decision making process about the behavioral management agreement6. If a visitor's behavior poses a threat to safety call refer to organization policy.7. Initiate consult with , Psychosocial CNS, Spiritual Care as appropriate  5/5/2025 0017 by Maria Esther Lopez, RN  Outcome: Progressing  Note: Patient remains behavioral compliant at this time. Patient encouraged to notify staff if in emotional distress. Staff ensures safety by providing safety checks on the unit intermittently and every 15 minutes. Staff continues to provide a safe environment.      
  Problem: Safety - Adult  Goal: Free from fall injury  5/10/2025 1056 by Chrystal Juárez, RN  Outcome: Progressing  Note: Patient has remained free from fall injury per shift. Patient continues to be monitored with 1:1 staffing continually. Non-skid socks worn while up.      Problem: Skin/Tissue Integrity  Goal: Skin integrity remains intact  Description: 1.  Monitor for areas of redness and/or skin breakdown2.  Assess vascular access sites hourly3.  Every 4-6 hours minimum:  Change oxygen saturation probe site4.  Every 4-6 hours:  If on nasal continuous positive airway pressure, respiratory therapy assess nares and determine need for appliance change or resting period  5/10/2025 1056 by Chrystal Juárez, RN  Outcome: Progressing  Flowsheets (Taken 5/10/2025 1056)  Skin Integrity Remains Intact:   Monitor for areas of redness and/or skin breakdown   Turn and reposition as indicated   Check visual cues for pain  Note: Dressing to Left forearm remains dry and intact. Will continue to monitor wound to left forearm. Patient denies pain / discomfort at this time.     
  Problem: Safety - Adult  Goal: Free from fall injury  5/11/2025 2230 by Ricardo Alcantara LPN  Outcome: Progressing  Note: Patient remains free from falls at time of assessment. One to one continues to maintain safety. Will continue to monitor.     Problem: Behavior  Goal: Pt/Family maintain appropriate behavior and adhere to behavioral management agreement, if implemented  Description: INTERVENTIONS:1. Assess patient/family's coping skills and  non-compliant behavior (including use of illegal substances)2. Notify security of behavior or suspected illegal substances which indicate the need for search of the family and/or belongings3. Encourage verbalization of thoughts and concerns in a socially appropriate manner4. Utilize positive, consistent limit setting strategies supporting safety of patient, staff and others5. Encourage participation in the decision making process about the behavioral management agreement6. If a visitor's behavior poses a threat to safety call refer to organization policy.7. Initiate consult with , Psychosocial CNS, Spiritual Care as appropriate  5/11/2025 2230 by Ricardo Alcantara LPN  Outcome: Progressing  Note: Patient maintain appropriate baseline behavior at time of assessment. Will continue to educate patient with strategies specific to patient.     
  Problem: Safety - Adult  Goal: Free from fall injury  5/12/2025 2056 by Arcadio Lester, RN  Outcome: Progressing    Problem: Skin/Tissue Integrity  Goal: Skin integrity remains intact  Description: 1.  Monitor for areas of redness and/or skin breakdown2.  Assess vascular access sites hourly3.  Every 4-6 hours minimum:  Change oxygen saturation probe site4.  Every 4-6 hours:  If on nasal continuous positive airway pressure, respiratory therapy assess nares and determine need for appliance change or resting period  5/12/2025 2056 by Arcadio Lester, RN  Outcome: Progressing    Patient will be free from falls during stay at hospital. Patient has an order for a 1:1 sitter that will provide constant observation and assistance. Patient will continue to have 15 minute safety rounds by hospital staff to ensure safety of patient. Patient has been educated on every 2 hour turns to maintain skin integrity.   
  Problem: Safety - Adult  Goal: Free from fall injury  5/14/2025 1225 by Manas Beebe LPN  Outcome: Progressing     Problem: Chronic Conditions and Co-morbidities  Goal: Patient's chronic conditions and co-morbidity symptoms are monitored and maintained or improved  5/14/2025 1225 by Manas Beebe LPN  Outcome: Progressing     Problem: Behavior  Goal: Pt/Family maintain appropriate behavior and adhere to behavioral management agreement, if implemented  Description: INTERVENTIONS:1. Assess patient/family's coping skills and  non-compliant behavior (including use of illegal substances)2. Notify security of behavior or suspected illegal substances which indicate the need for search of the family and/or belongings3. Encourage verbalization of thoughts and concerns in a socially appropriate manner4. Utilize positive, consistent limit setting strategies supporting safety of patient, staff and others5. Encourage participation in the decision making process about the behavioral management agreement6. If a visitor's behavior poses a threat to safety call refer to organization policy.7. Initiate consult with , Psychosocial CNS, Spiritual Care as appropriate  5/14/2025 1225 by Manas Beebe LPN  Outcome: Progressing     Problem: Nutrition Deficit:  Goal: Optimize nutritional status  5/14/2025 1225 by Manas Beebe LPN  Outcome: Progressing     Problem: Skin/Tissue Integrity  Goal: Skin integrity remains intact  Description: 1.  Monitor for areas of redness and/or skin breakdown2.  Assess vascular access sites hourly3.  Every 4-6 hours minimum:  Change oxygen saturation probe site4.  Every 4-6 hours:  If on nasal continuous positive airway pressure, respiratory therapy assess nares and determine need for appliance change or resting period  5/14/2025 1225 by Manas Beebe LPN  Outcome: Progressing   Patient alert to self only.  Pleasant and confused.  Patient denies pain at this time.  
  Problem: Safety - Adult  Goal: Free from fall injury  5/14/2025 2224 by Ewa Kevin LPN  Outcome: Progressing     Problem: Behavior  Goal: Pt/Family maintain appropriate behavior and adhere to behavioral management agreement, if implemented  Description: INTERVENTIONS:1. Assess patient/family's coping skills and  non-compliant behavior (including use of illegal substances)2. Notify security of behavior or suspected illegal substances which indicate the need for search of the family and/or belongings3. Encourage verbalization of thoughts and concerns in a socially appropriate manner4. Utilize positive, consistent limit setting strategies supporting safety of patient, staff and others5. Encourage participation in the decision making process about the behavioral management agreement6. If a visitor's behavior poses a threat to safety call refer to organization policy.7. Initiate consult with , Psychosocial CNS, Spiritual Care as appropriate  5/14/2025 2224 by Ewa Kevin LPN  Outcome: Progressing       Patient remains free from any falls at this time. 1:1 continued for safety. Safety checks continued every 15 minutes.  
  Problem: Safety - Adult  Goal: Free from fall injury  5/5/2025 2004 by Arcadio Lester, RN  Outcome: Progressing      Problem: Skin/Tissue Integrity  Goal: Skin integrity remains intact  Description: 1.  Monitor for areas of redness and/or skin breakdown2.  Assess vascular access sites hourly3.  Every 4-6 hours minimum:  Change oxygen saturation probe site4.  Every 4-6 hours:  If on nasal continuous positive airway pressure, respiratory therapy assess nares and determine need for appliance change or resting period  5/5/2025 2004 by Arcadio Lester, RN  Outcome: Progressing  Flowsheets (Taken 5/5/2025 1958)  Skin Integrity Remains Intact:   Monitor for areas of redness and/or skin breakdown   Turn and reposition as indicated    Patient will be free from fall injury. Patient will have continuous staff observer to help assist patient with ADL's and transferring. Patient has been educated on the use of the call light and to ask for assistance when in need. Patient's skin integrity will remain intact. Patient will have 2 hour turns every hour and vascualar sites assessed and monitored.    
  Problem: Safety - Adult  Goal: Free from fall injury  5/6/2025 2306 by Arcadio Lester RN  Outcome: Progressing      Problem: Skin/Tissue Integrity  Goal: Skin integrity remains intact  Description: 1.  Monitor for areas of redness and/or skin breakdown2.  Assess vascular access sites hourly3.  Every 4-6 hours minimum:  Change oxygen saturation probe site4.  Every 4-6 hours:  If on nasal continuous positive airway pressure, respiratory therapy assess nares and determine need for appliance change or resting period  Outcome: Progressing  Flowsheets (Taken 5/6/2025 2255)  Skin Integrity Remains Intact: Monitor for areas of redness and/or skin breakdown    Patient will be free from fall injury. Patient has been educated on how to use the call light and will continue to have a staff sitter at all times. Patient's skin integrity will remain intact during stay at hospital. Patient will rotate positions every 2 hours and perform daily ADL's during stay in hospital.      
  Problem: Safety - Adult  Goal: Free from fall injury  5/7/2025 2240 by Arcadio Lester, RN  Outcome: Progressing      Problem: Risk for Elopement  Goal: Patient will not exit the unit/facility without proper excort  5/7/2025 2240 by Arcadio Lester, RN  Outcome: Progressing    Patient will be free from fall injury during stay at hospital. Patient has a 1:1 sitter continuously and staff safety check rounding every 15 minutes while a patient in hospital. Patient will be free from elopement during stay at hospital. Patient will be escorted out of building by hospital staff at discharge.    
  Problem: Safety - Adult  Goal: Free from fall injury  Outcome: Progressing     Problem: Behavior  Goal: Pt/Family maintain appropriate behavior and adhere to behavioral management agreement, if implemented  Description: INTERVENTIONS:1. Assess patient/family's coping skills and  non-compliant behavior (including use of illegal substances)2. Notify security of behavior or suspected illegal substances which indicate the need for search of the family and/or belongings3. Encourage verbalization of thoughts and concerns in a socially appropriate manner4. Utilize positive, consistent limit setting strategies supporting safety of patient, staff and others5. Encourage participation in the decision making process about the behavioral management agreement6. If a visitor's behavior poses a threat to safety call refer to organization policy.7. Initiate consult with , Psychosocial CNS, Spiritual Care as appropriate  Outcome: Progressing   Patient remains safe on the unit free from falls and self harm.  Patient remains medication compliant and behavior controlled.  
  Problem: Safety - Adult  Goal: Free from fall injury  Outcome: Progressing     Problem: Behavior  Goal: Pt/Family maintain appropriate behavior and adhere to behavioral management agreement, if implemented  Description: INTERVENTIONS:1. Assess patient/family's coping skills and  non-compliant behavior (including use of illegal substances)2. Notify security of behavior or suspected illegal substances which indicate the need for search of the family and/or belongings3. Encourage verbalization of thoughts and concerns in a socially appropriate manner4. Utilize positive, consistent limit setting strategies supporting safety of patient, staff and others5. Encourage participation in the decision making process about the behavioral management agreement6. If a visitor's behavior poses a threat to safety call refer to organization policy.7. Initiate consult with , Psychosocial CNS, Spiritual Care as appropriate  Outcome: Progressing   Patient remains safe on the unit free from falls and self harm.  Patient remains medication compliant and behavior controlled.  
  Problem: Safety - Adult  Goal: Free from fall injury  Outcome: Progressing  Flowsheets (Taken 5/8/2025 1436)  Free From Fall Injury:   Instruct family/caregiver on patient safety   Based on caregiver fall risk screen, instruct family/caregiver to ask for assistance with transferring infant if caregiver noted to have fall risk factors  Note: Pt remains free of falls and verbalizes understanding of individual fall risks.  Pt wearing non skid footwear and encouraged to seek out staff for any assistance needed.         Problem: Chronic Conditions and Co-morbidities  Goal: Patient's chronic conditions and co-morbidity symptoms are monitored and maintained or improved  Outcome: Progressing  Flowsheets (Taken 5/8/2025 1436)  Care Plan - Patient's Chronic Conditions and Co-Morbidity Symptoms are Monitored and Maintained or Improved: Monitor and assess patient's chronic conditions and comorbid symptoms for stability, deterioration, or improvement  Note: Co morbid     Problem: Behavior  Goal: Pt/Family maintain appropriate behavior and adhere to behavioral management agreement, if implemented  Description: INTERVENTIONS:1. Assess patient/family's coping skills and  non-compliant behavior (including use of illegal substances)2. Notify security of behavior or suspected illegal substances which indicate the need for search of the family and/or belongings3. Encourage verbalization of thoughts and concerns in a socially appropriate manner4. Utilize positive, consistent limit setting strategies supporting safety of patient, staff and others5. Encourage participation in the decision making process about the behavioral management agreement6. If a visitor's behavior poses a threat to safety call refer to organization policy.7. Initiate consult with , Psychosocial CNS, Spiritual Care as appropriate  Outcome: Progressing     Problem: Nutrition Deficit:  Goal: Optimize nutritional status  Outcome: Progressing  Flowsheets 
  Problem: Self Harm/Suicidality  Goal: Will have no self-injury during hospital stay  Description: INTERVENTIONS:1.  Ensure constant observer at bedside with Q15M safety checks2.  Maintain a safe environment3.  Secure patient belongings4.  Ensure family/visitors adhere to safety recommendations5.  Ensure safety tray has been added to patient's diet order6.  Every shift and PRN: Re-assess suicidal risk via Frequent Screener  5/16/2025 2109 by Arcadio Lester, RN  Outcome: Progressing  Flowsheets (Taken 5/16/2025 2103)  Will have no self-injury during hospital stay:   Ensure constant observer at bedside with Q15M safety checks   Maintain a safe environment   Secure patient belongings        Problem: Depression  Goal: Will be euthymic at discharge  Description: INTERVENTIONS:1. Administer medication as ordered2. Provide emotional support via 1:1 interaction with staff3. Encourage involvement in milieu/groups/activities4. Monitor for social isolation  5/16/2025 2109 by Arcadio Lester, RN  Outcome: Progressing    Patient will have no self harm or injury during hospital stay. Patient has a continued ordered for a 1:1 sitter. Patient will have constant supervision and 15 minute safety rounding while a patient in the hospital. Patient will be euthymic at discharge. Patient is medically compliant and will continue with medication regimen at discharge.        
  Problem: Self Harm/Suicidality  Goal: Will have no self-injury during hospital stay  Description: INTERVENTIONS:1.  Ensure constant observer at bedside with Q15M safety checks2.  Maintain a safe environment3.  Secure patient belongings4.  Ensure family/visitors adhere to safety recommendations5.  Ensure safety tray has been added to patient's diet order6.  Every shift and PRN: Re-assess suicidal risk via Frequent Screener  5/17/2025 2042 by Alfredo Duran LPN  Outcome: Progressing  Patient remains free from injury, suicidal ideation, and falls this shift.  Patient cooperative with care. Patient confused at times. Patient is easily redirectable. 1:1 constant observer and Q  Problem: Anxiety  Goal: Will report anxiety at manageable levels  Description: INTERVENTIONS:1. Administer medication as ordered2. Teach and rehearse alternative coping skills3. Provide emotional support with 1:1 interaction with staff  5/17/2025 2042 by Alfredo Duran LPN  Outcome: Progressing  Patient has been calm and corporative this shift. Patient sat by the window to relax with 1:1 staff. 1:1 constant observer continued for safety. 15 minutes checks continued.   
  Problem: Self Harm/Suicidality  Goal: Will have no self-injury during hospital stay  Description: INTERVENTIONS:1.  Ensure constant observer at bedside with Q15M safety checks2.  Maintain a safe environment3.  Secure patient belongings4.  Ensure family/visitors adhere to safety recommendations5.  Ensure safety tray has been added to patient's diet order6.  Every shift and PRN: Re-assess suicidal risk via Frequent Screener  5/18/2025 2129 by Maria Esther Lopez RN  Outcome: Progressing  Note: Patient denies thoughts of SI/HI/self-harm. Patient agreed to seek out staff if thoughts of SI/HI/self-harm arise. Safe environment maintained. Q15 minute checks for safety continued per unit policy. Will continue to monitor for safety and provide support and reassurance as needed.      Problem: Anxiety  Goal: Will report anxiety at manageable levels  Description: INTERVENTIONS:1. Administer medication as ordered2. Teach and rehearse alternative coping skills3. Provide emotional support with 1:1 interaction with staff  5/18/2025 2129 by Maria Esther Lopez RN  Outcome: Progressing  Note: Patient reports anxiety at this time. Effective coping behaviors explored with patient, emotional support and reassurance provided as needed. PRN sleep aid medications given to help patient relax and have a restful sleep.       
  Problem: Self Harm/Suicidality  Goal: Will have no self-injury during hospital stay  Description: INTERVENTIONS:1.  Ensure constant observer at bedside with Q15M safety checks2.  Maintain a safe environment3.  Secure patient belongings4.  Ensure family/visitors adhere to safety recommendations5.  Ensure safety tray has been added to patient's diet order6.  Every shift and PRN: Re-assess suicidal risk via Frequent Screener  5/19/2025 1959 by Jose Malcolm LPN  Outcome: Progressing  Note: Patient has not expressed any concerns of self harm and not displayed any signs of dealing with suicidal thoughts. Patient continues 1:1 .      Problem: Depression  Goal: Will be euthymic at discharge  Description: INTERVENTIONS:1. Administer medication as ordered2. Provide emotional support via 1:1 interaction with staff3. Encourage involvement in milieu/groups/activities4. Monitor for social isolation  5/19/2025 1959 by Jose Malcolm LPN  Outcome: Progressing  Note: Patient has not mentioned any signs of depression at this current time.      Problem: Anxiety  Goal: Will report anxiety at manageable levels  Description: INTERVENTIONS:1. Administer medication as ordered2. Teach and rehearse alternative coping skills3. Provide emotional support with 1:1 interaction with staff  5/19/2025 1959 by Jose Malcolm LPN  Outcome: Progressing  Note: Patient has been doing well today. Patient has not been showing signs of irritability. 1:1 continues.     
  Problem: Self Harm/Suicidality  Goal: Will have no self-injury during hospital stay  Description: INTERVENTIONS:1.  Ensure constant observer at bedside with Q15M safety checks2.  Maintain a safe environment3.  Secure patient belongings4.  Ensure family/visitors adhere to safety recommendations5.  Ensure safety tray has been added to patient's diet order6.  Every shift and PRN: Re-assess suicidal risk via Frequent Screener  Outcome: Progressing  Flowsheets (Taken 5/17/2025 1134)  Will have no self-injury during hospital stay:   Ensure constant observer at bedside with Q15M safety checks   Maintain a safe environment  Note:   Patient calm, cooperative with care yet pleasantly confused. Patient remains easily redirectable with verbal prompts.  Patient continues to be monitored via 1:1 staffing for safety.       Problem: Anxiety  Goal: Will report anxiety at manageable levels  Description: INTERVENTIONS:1. Administer medication as ordered2. Teach and rehearse alternative coping skills3. Provide emotional support with 1:1 interaction with staff  Outcome: Progressing  Flowsheets (Taken 5/17/2025 1134)  Will report anxiety at manageable levels:   Administer medication as ordered   Provide emotional support with 1:1 interaction with staff     
  Problem: Self Harm/Suicidality  Goal: Will have no self-injury during hospital stay  Description: INTERVENTIONS:1.  Ensure constant observer at bedside with Q15M safety checks2.  Maintain a safe environment3.  Secure patient belongings4.  Ensure family/visitors adhere to safety recommendations5.  Ensure safety tray has been added to patient's diet order6.  Every shift and PRN: Re-assess suicidal risk via Frequent Screener  Outcome: Progressing  Note: Patient denies thoughts of harm. Patient maintains safety during hospital stay being free of injuries and falls. Patient is 1:1 with sitter/ staff at bedside. Patient appropriately utilizes assistive devices. Patient receives Q15M safety checks during stay. Patient is encouraged to attend groups to strengthen and develop coping skills.     Problem: Anxiety  Goal: Will report anxiety at manageable levels  Description: INTERVENTIONS:1. Administer medication as ordered2. Teach and rehearse alternative coping skills3. Provide emotional support with 1:1 interaction with staff  Outcome: Progressing  Note: Patient is found in dayroom watching television with peers in a relaxed state. Patient is quiet and not exhibiting signs of anxiousness, discomfort or irritability.      
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 Patient was labile between combative and cooperative while at [facility]. Upon admission patient was lethargic and unable to answer any assessment questions. Patient has pablo catheter.    Estimated Length of Stay:  8-14 days  Estimated Discharge Date Update:   to be determined by physician    PATIENT STRENGTHS:  Patient Strengths:   Patient Strengths and Limitations:Limitations: Perceives need for assistance with self-care  Addictive Behavior:Addictive Behavior  In the Past 3 Months, Have You Felt or Has Someone Told You That You Have a Problem With  :  (JENARO)  Medical Problems:   Past Medical History:   Diagnosis Date    Autoimmune thyroiditis     Dementia (HCC)     Osteoporosis        Risk:  Fall Risk   Bar Scale Bar Scale Score: 18  BVC      Change in scores:  No. Changes to plan of Care:  No    Status EXAM:   Mental Status and Behavioral Exam  Normal: No  Level of Assistance: Independent/Self  Facial Expression: Brightened  Affect: Appropriate  Level of Consciousness: Confused  Frequency of Checks: 1 staff: 1 patient  - continuous  Mood:Normal: Yes  Mood: Anxious, Other (comment) (confused)  Motor Activity:Normal: No  Motor Activity: Unusual posture/gait  Eye Contact: Good  Observed Behavior: Cooperative, Friendly  Sexual Misconduct History: Current - no  Preception: [place] to person  Attention:Normal: No  Attention: Unable to concentrate  Thought Processes: Loose association  Thought Content:Normal: No  Thought Content: Preoccupations  Depression Symptoms: No problems reported or observed.  Anxiety Symptoms: Generalized  Keila Symptoms: No 
Problem: Behavior  Goal: Pt/Family maintain appropriate behavior and adhere to behavioral management agreement, if implemented  Description: INTERVENTIONS:1. Assess patient/family's coping skills and  non-compliant behavior (including use of illegal substances)2. Notify security of behavior or suspected illegal substances which indicate the need for search of the family and/or belongings3. Encourage verbalization of thoughts and concerns in a socially appropriate manner4. Utilize positive, consistent limit setting strategies supporting safety of patient, staff and others5. Encourage participation in the decision making process about the behavioral management agreement6. If a visitor's behavior poses a threat to safety call refer to organization policy.7. Initiate consult with , Psychosocial CNS, Spiritual Care as appropriate  Outcome: Progressing     Patient remained in behavioral control and was pleasant and friendly with staff. Cooperated with all assessments and medication administration.    Problem: Safety - Adult  Goal: Free from fall injury  5/11/2025 0008 by Yordan Jauregui RN  Outcome: Progressing     Patient remained free from falls and fall injury.  
Problem: Involuntary Admit  Goal: Will cooperate with staff recommendations and doctor's orders and will demonstrate appropriate behavior  Description: INTERVENTIONS:1. Treat underlying conditions and offer medication as ordered2. Educate regarding involuntary admission procedures and rules3. Contain excessive/inappropriate behavior per unit and hospital policies  Outcome: Progressing     Patient cooperated with staff assessments. Patient did not get out of bed and had no behavioral issues.    Problem: Safety - Adult  Goal: Free from fall injury  Outcome: Progressing     Patient remained in bed and free from falls/fall injury.  
Problem: Involuntary Admit  Goal: Will cooperate with staff recommendations and doctor's orders and will demonstrate appropriate behavior  Description: INTERVENTIONS:1. Treat underlying conditions and offer medication as ordered2. Educate regarding involuntary admission procedures and rules3. Contain excessive/inappropriate behavior per unit and hospital policies  Outcome: Progressing   Patient JESSY Dahl is supposed to be here during visitation, will sign in patient voluntary.     Problem: Behavior  Goal: Pt/Family maintain appropriate behavior and adhere to behavioral management agreement, if implemented  Description: INTERVENTIONS:1. Assess patient/family's coping skills and  non-compliant behavior (including use of illegal substances)2. Notify security of behavior or suspected illegal substances which indicate the need for search of the family and/or belongings3. Encourage verbalization of thoughts and concerns in a socially appropriate manner4. Utilize positive, consistent limit setting strategies supporting safety of patient, staff and others5. Encourage participation in the decision making process about the behavioral management agreement6. If a visitor's behavior poses a threat to safety call refer to organization policy.7. Initiate consult with , Psychosocial CNS, Spiritual Care as appropriate  Outcome: Progressing   Patient is very friendly during assessment. She has periods were if you are asking the same question over and over she will get a little upset, but easily forgets it.     Problem: Safety - Adult  Goal: Free from fall injury  Outcome: Progressing   Patient remains free from falls. She is using her walker and needs assistance and guidance while using it.     Problem: Chronic Conditions and Co-morbidities  Goal: Patient's chronic conditions and co-morbidity symptoms are monitored and maintained or improved  Outcome: Progressing   Patient compliant with scheduled medications. 
Problem: Involuntary Admit  Goal: Will cooperate with staff recommendations and doctor's orders and will demonstrate appropriate behavior  Description: INTERVENTIONS:1. Treat underlying conditions and offer medication as ordered2. Educate regarding involuntary admission procedures and rules3. Contain excessive/inappropriate behavior per unit and hospital policies  Outcome: Progressing   Patient JESSY Dahl will sign in tomorrow.     Problem: Behavior  Goal: Pt/Family maintain appropriate behavior and adhere to behavioral management agreement, if implemented  Description: INTERVENTIONS:1. Assess patient/family's coping skills and  non-compliant behavior (including use of illegal substances)2. Notify security of behavior or suspected illegal substances which indicate the need for search of the family and/or belongings3. Encourage verbalization of thoughts and concerns in a socially appropriate manner4. Utilize positive, consistent limit setting strategies supporting safety of patient, staff and others5. Encourage participation in the decision making process about the behavioral management agreement6. If a visitor's behavior poses a threat to safety call refer to organization policy.7. Initiate consult with , Psychosocial CNS, Spiritual Care as appropriate  5/2/2025 1457 by Betty Davison, RN  Outcome: Progressing   Patient is pleasant with staff. She needs redirection with tasks but does well with redirection.     Problem: Safety - Adult  Goal: Free from fall injury  5/2/2025 1457 by Betty Davison, RN  Outcome: Progressing   Patient remains free from falls. She needs direction when using the walker.     Problem: Chronic Conditions and Co-morbidities  Goal: Patient's chronic conditions and co-morbidity symptoms are monitored and maintained or improved  Outcome: Progressing   Patient compliant with medications. She does well talking her meds whole with water, one at a time.     Problem: Risk for 
Problem: Safety - Adult  Goal: Free from fall injury  5/13/2025 2228 by Jose Malcolm LPN  Outcome: Progressing  Note: Patient remains free from falls at this time. Patient uses front wheel walker and wheelchair for ambulation. Patient assisted with redirection involving remembering to use walker for assistance. 1:1 continues with sitter at bedside and 15 minute rounding on unit.     Problem: Chronic Conditions and Co-morbidities  Goal: Patient's chronic conditions and co-morbidity symptoms are monitored and maintained or improved  Outcome: Progressing  Note: Patient is stable with room for improvement.      Problem: Behavior  Goal: Pt/Family maintain appropriate behavior and adhere to behavioral management agreement, if implemented  Description: INTERVENTIONS:1. Assess patient/family's coping skills and  non-compliant behavior (including use of illegal substances)2. Notify security of behavior or suspected illegal substances which indicate the need for search of the family and/or belongings3. Encourage verbalization of thoughts and concerns in a socially appropriate manner4. Utilize positive, consistent limit setting strategies supporting safety of patient, staff and others5. Encourage participation in the decision making process about the behavioral management agreement6. If a visitor's behavior poses a threat to safety call refer to organization policy.7. Initiate consult with , Psychosocial CNS, Spiritual Care as appropriate  5/13/2025 2228 by Jose Malcolm LPN  Outcome: Progressing  Note: Patient maintains appropriate behavior and adheres to behavioral management agreement at this time.      Problem: Behavior  Goal: Pt/Family maintain appropriate behavior and adhere to behavioral management agreement, if implemented  Description: INTERVENTIONS:1. Assess patient/family's coping skills and  non-compliant behavior (including use of illegal substances)2. Notify security of behavior or suspected illegal 
Problem: Self Harm/Suicidality  Goal: Will have no self-injury during hospital stay  Description: INTERVENTIONS:1.  Ensure constant observer at bedside with Q15M safety checks2.  Maintain a safe environment3.  Secure patient belongings4.  Ensure family/visitors adhere to safety recommendations5.  Ensure safety tray has been added to patient's diet order6.  Every shift and PRN: Re-assess suicidal risk via Frequent Screener  Outcome: Progressing   Patient denied suicidal ideations. Safe environment maintained. Safety checks every 15 minutes continued per facility policy     Problem: Depression  Goal: Will be euthymic at discharge  Description: INTERVENTIONS:1. Administer medication as ordered2. Provide emotional support via 1:1 interaction with staff3. Encourage involvement in milieu/groups/activities4. Monitor for social isolation  Outcome: Progressing   Patient is pleasantly confused. Reports no feelings of sadness. Attending select groups, social with peers in day area.     Problem: Anxiety  Goal: Will report anxiety at manageable levels  Description: INTERVENTIONS:1. Administer medication as ordered2. Teach and rehearse alternative coping skills3. Provide emotional support with 1:1 interaction with staff  Outcome: Progressing   Patient is pleasantly confused. Reports no anxiety.     Problem: Nutrition Deficit:  Goal: Optimize nutritional status  Recent Flowsheet Documentation  Taken 5/19/2025 1018 by Nimco Gonzales, JOSE, LD  Nutrient intake appropriate for improving, restoring, or maintaining nutritional needs: Monitor oral intake, labs, and treatment plans   Intake being monitored. Fair appetite.   
elopement at this time as evidence by patient has not tried to escape at this time.  Behaviors are not appropriate for treatment at this time patient denies all. Patient refuses medication that would aide in treatment of patient.  Patient nutrition adequate as evidence by patient eat % of meals today.  Skin integrity continues continues to remain in progress for healing by treating open wounds with treatment appropriate for the healing process.  Continue to monitor no signs and symptoms of distress observe.  
security of behavior or suspected illegal substances which indicate the need for search of the patient and/or belongings   Encourage verbalization of thoughts and concerns in a socially appropriate manner   Patient pleasantly confused. She eat her meals during meal times. She will let you know when she needs to use the restroom. She has been cooperative with care. She takes her medication as prescribed.   Problem: Nutrition Deficit:  Goal: Optimize nutritional status  Outcome: Progressing  Flowsheets (Taken 5/11/2025 1609)  Nutrient intake appropriate for improving, restoring, or maintaining nutritional needs:   Assess nutritional status and recommend course of action   Monitor oral intake, labs, and treatment plans   Recommend appropriate diets, oral nutritional supplements, and vitamin/mineral supplements   Order, calculate, and assess calorie counts as needed   Recommend, monitor, and adjust tube feedings and TPN/PPN based on assessed needs     Problem: Skin/Tissue Integrity  Goal: Skin integrity remains intact  Description: 1.  Monitor for areas of redness and/or skin breakdown2.  Assess vascular access sites hourly3.  Every 4-6 hours minimum:  Change oxygen saturation probe site4.  Every 4-6 hours:  If on nasal continuous positive airway pressure, respiratory therapy assess nares and determine need for appliance change or resting period  Outcome: Progressing  Flowsheets (Taken 5/11/2025 1606)  Skin Integrity Remains Intact:   Monitor for areas of redness and/or skin breakdown   Assess vascular access sites hourly   Every 4-6 hours minimum:  Change oxygen saturation probe site   Turn and reposition as indicated   Assess need for specialty bed   Positioning devices

## 2025-05-20 NOTE — BH NOTE
JESSY Ramirez notified of Expected pick-up time for transport to [facility].